# Patient Record
Sex: FEMALE | Race: WHITE | Employment: OTHER | URBAN - METROPOLITAN AREA
[De-identification: names, ages, dates, MRNs, and addresses within clinical notes are randomized per-mention and may not be internally consistent; named-entity substitution may affect disease eponyms.]

---

## 2017-03-13 PROBLEM — M17.11 PRIMARY OSTEOARTHRITIS OF RIGHT KNEE: Status: ACTIVE | Noted: 2017-03-13

## 2017-03-13 PROBLEM — S83.249A ACUTE MEDIAL MENISCAL TEAR: Status: ACTIVE | Noted: 2017-03-13

## 2017-05-05 PROBLEM — M65.90 SYNOVITIS: Status: ACTIVE | Noted: 2017-05-05

## 2017-05-05 PROBLEM — M65.9 SYNOVITIS: Status: ACTIVE | Noted: 2017-05-05

## 2017-05-05 PROBLEM — S83.271A COMPLEX TEAR OF LATERAL MENISCUS OF RIGHT KNEE AS CURRENT INJURY: Status: ACTIVE | Noted: 2017-05-05

## 2018-04-10 ENCOUNTER — HOSPITAL ENCOUNTER (EMERGENCY)
Age: 53
Discharge: HOME | End: 2018-04-10
Payer: COMMERCIAL

## 2018-04-10 ENCOUNTER — APPOINTMENT (OUTPATIENT)
Dept: GENERAL RADIOLOGY | Age: 53
End: 2018-04-10
Payer: COMMERCIAL

## 2018-04-10 ENCOUNTER — APPOINTMENT (OUTPATIENT)
Dept: CT IMAGING | Age: 53
End: 2018-04-10
Payer: COMMERCIAL

## 2018-04-10 VITALS
TEMPERATURE: 97.6 F | OXYGEN SATURATION: 97 % | WEIGHT: 160 LBS | DIASTOLIC BLOOD PRESSURE: 74 MMHG | BODY MASS INDEX: 24.25 KG/M2 | RESPIRATION RATE: 18 BRPM | HEART RATE: 88 BPM | SYSTOLIC BLOOD PRESSURE: 112 MMHG | HEIGHT: 68 IN

## 2018-04-10 DIAGNOSIS — N39.0 URINARY TRACT INFECTION WITHOUT HEMATURIA, SITE UNSPECIFIED: ICD-10-CM

## 2018-04-10 DIAGNOSIS — S39.012A LUMBOSACRAL STRAIN, INITIAL ENCOUNTER: Primary | ICD-10-CM

## 2018-04-10 DIAGNOSIS — K80.20 CALCULUS OF GALLBLADDER WITHOUT CHOLECYSTITIS WITHOUT OBSTRUCTION: ICD-10-CM

## 2018-04-10 LAB
ANION GAP SERPL CALCULATED.3IONS-SCNC: 9 MMOL/L (ref 7–16)
BACTERIA: ABNORMAL /HPF
BASOPHILS ABSOLUTE: 0.02 E9/L (ref 0–0.2)
BASOPHILS RELATIVE PERCENT: 0.4 % (ref 0–2)
BILIRUBIN URINE: NEGATIVE
BLOOD, URINE: ABNORMAL
BUN BLDV-MCNC: 7 MG/DL (ref 6–20)
CALCIUM SERPL-MCNC: 9.5 MG/DL (ref 8.6–10.2)
CHLORIDE BLD-SCNC: 103 MMOL/L (ref 98–107)
CLARITY: ABNORMAL
CO2: 28 MMOL/L (ref 22–29)
COLOR: YELLOW
CREAT SERPL-MCNC: 0.6 MG/DL (ref 0.5–1)
EOSINOPHILS ABSOLUTE: 0.06 E9/L (ref 0.05–0.5)
EOSINOPHILS RELATIVE PERCENT: 1.2 % (ref 0–6)
EPITHELIAL CELLS, UA: ABNORMAL /HPF
GFR AFRICAN AMERICAN: >60
GFR NON-AFRICAN AMERICAN: >60 ML/MIN/1.73
GLUCOSE BLD-MCNC: 91 MG/DL (ref 74–109)
GLUCOSE URINE: NEGATIVE MG/DL
HCT VFR BLD CALC: 45.5 % (ref 34–48)
HEMOGLOBIN: 15.2 G/DL (ref 11.5–15.5)
IMMATURE GRANULOCYTES #: 0.01 E9/L
IMMATURE GRANULOCYTES %: 0.2 % (ref 0–5)
KETONES, URINE: NEGATIVE MG/DL
LEUKOCYTE ESTERASE, URINE: NEGATIVE
LYMPHOCYTES ABSOLUTE: 2.27 E9/L (ref 1.5–4)
LYMPHOCYTES RELATIVE PERCENT: 44.9 % (ref 20–42)
MCH RBC QN AUTO: 31.3 PG (ref 26–35)
MCHC RBC AUTO-ENTMCNC: 33.4 % (ref 32–34.5)
MCV RBC AUTO: 93.8 FL (ref 80–99.9)
MONOCYTES ABSOLUTE: 0.34 E9/L (ref 0.1–0.95)
MONOCYTES RELATIVE PERCENT: 6.7 % (ref 2–12)
NEUTROPHILS ABSOLUTE: 2.36 E9/L (ref 1.8–7.3)
NEUTROPHILS RELATIVE PERCENT: 46.6 % (ref 43–80)
NITRITE, URINE: NEGATIVE
PDW BLD-RTO: 12.1 FL (ref 11.5–15)
PH UA: 6.5 (ref 5–9)
PLATELET # BLD: 171 E9/L (ref 130–450)
PMV BLD AUTO: 11 FL (ref 7–12)
POTASSIUM SERPL-SCNC: 4 MMOL/L (ref 3.5–5)
PROTEIN UA: NEGATIVE MG/DL
RBC # BLD: 4.85 E12/L (ref 3.5–5.5)
RBC UA: ABNORMAL /HPF (ref 0–2)
SODIUM BLD-SCNC: 140 MMOL/L (ref 132–146)
SPECIFIC GRAVITY UA: 1.01 (ref 1–1.03)
UROBILINOGEN, URINE: 0.2 E.U./DL
WBC # BLD: 5.1 E9/L (ref 4.5–11.5)
WBC UA: ABNORMAL /HPF (ref 0–5)

## 2018-04-10 PROCEDURE — 74176 CT ABD & PELVIS W/O CONTRAST: CPT

## 2018-04-10 PROCEDURE — 85025 COMPLETE CBC W/AUTO DIFF WBC: CPT

## 2018-04-10 PROCEDURE — 72100 X-RAY EXAM L-S SPINE 2/3 VWS: CPT

## 2018-04-10 PROCEDURE — 80048 BASIC METABOLIC PNL TOTAL CA: CPT

## 2018-04-10 PROCEDURE — 81001 URINALYSIS AUTO W/SCOPE: CPT

## 2018-04-10 PROCEDURE — 36415 COLL VENOUS BLD VENIPUNCTURE: CPT

## 2018-04-10 PROCEDURE — 99212 OFFICE O/P EST SF 10 MIN: CPT

## 2018-04-10 RX ORDER — NAPROXEN 500 MG/1
500 TABLET ORAL 2 TIMES DAILY
Qty: 14 TABLET | Refills: 0 | Status: SHIPPED | OUTPATIENT
Start: 2018-04-10 | End: 2018-04-26

## 2018-04-10 RX ORDER — NITROFURANTOIN 25; 75 MG/1; MG/1
100 CAPSULE ORAL 2 TIMES DAILY
Qty: 10 CAPSULE | Refills: 0 | Status: SHIPPED | OUTPATIENT
Start: 2018-04-10 | End: 2018-04-15

## 2018-04-10 RX ORDER — CYCLOBENZAPRINE HCL 10 MG
10 TABLET ORAL 2 TIMES DAILY PRN
Qty: 10 TABLET | Refills: 0 | Status: SHIPPED | OUTPATIENT
Start: 2018-04-10 | End: 2018-04-15

## 2018-04-10 ASSESSMENT — PAIN DESCRIPTION - LOCATION: LOCATION: BACK

## 2018-04-10 ASSESSMENT — PAIN DESCRIPTION - PAIN TYPE: TYPE: ACUTE PAIN

## 2018-04-10 ASSESSMENT — PAIN DESCRIPTION - ORIENTATION: ORIENTATION: RIGHT;LEFT;UPPER;LOWER

## 2018-04-10 ASSESSMENT — PAIN DESCRIPTION - FREQUENCY: FREQUENCY: CONTINUOUS

## 2018-04-10 ASSESSMENT — PAIN DESCRIPTION - ONSET: ONSET: GRADUAL

## 2018-04-10 ASSESSMENT — PAIN DESCRIPTION - DESCRIPTORS: DESCRIPTORS: SHOOTING;ACHING

## 2018-04-10 ASSESSMENT — PAIN DESCRIPTION - PROGRESSION: CLINICAL_PROGRESSION: GRADUALLY WORSENING

## 2018-04-10 ASSESSMENT — PAIN SCALES - GENERAL: PAINLEVEL_OUTOF10: 8

## 2018-04-11 ENCOUNTER — CARE COORDINATION (OUTPATIENT)
Dept: CARE COORDINATION | Age: 53
End: 2018-04-11

## 2018-04-13 ENCOUNTER — OFFICE VISIT (OUTPATIENT)
Dept: FAMILY MEDICINE CLINIC | Age: 53
End: 2018-04-13
Payer: COMMERCIAL

## 2018-04-13 VITALS
WEIGHT: 175 LBS | RESPIRATION RATE: 16 BRPM | SYSTOLIC BLOOD PRESSURE: 100 MMHG | HEART RATE: 85 BPM | DIASTOLIC BLOOD PRESSURE: 56 MMHG | OXYGEN SATURATION: 98 % | TEMPERATURE: 97.9 F | BODY MASS INDEX: 26.52 KG/M2 | HEIGHT: 68 IN

## 2018-04-13 DIAGNOSIS — R31.9 URINARY TRACT INFECTION WITH HEMATURIA, SITE UNSPECIFIED: Primary | ICD-10-CM

## 2018-04-13 DIAGNOSIS — N39.0 URINARY TRACT INFECTION WITH HEMATURIA, SITE UNSPECIFIED: Primary | ICD-10-CM

## 2018-04-13 PROCEDURE — G8419 CALC BMI OUT NRM PARAM NOF/U: HCPCS | Performed by: NURSE PRACTITIONER

## 2018-04-13 PROCEDURE — G8427 DOCREV CUR MEDS BY ELIG CLIN: HCPCS | Performed by: NURSE PRACTITIONER

## 2018-04-13 PROCEDURE — 4004F PT TOBACCO SCREEN RCVD TLK: CPT | Performed by: NURSE PRACTITIONER

## 2018-04-13 PROCEDURE — 99213 OFFICE O/P EST LOW 20 MIN: CPT | Performed by: NURSE PRACTITIONER

## 2018-04-13 PROCEDURE — 3017F COLORECTAL CA SCREEN DOC REV: CPT | Performed by: NURSE PRACTITIONER

## 2018-04-13 PROCEDURE — 3014F SCREEN MAMMO DOC REV: CPT | Performed by: NURSE PRACTITIONER

## 2018-04-13 ASSESSMENT — ENCOUNTER SYMPTOMS
WHEEZING: 0
SHORTNESS OF BREATH: 0
ABDOMINAL PAIN: 1
VOMITING: 0
CONSTIPATION: 0
COUGH: 0
DIARRHEA: 0
NAUSEA: 0

## 2018-04-15 ENCOUNTER — HOSPITAL ENCOUNTER (EMERGENCY)
Age: 53
Discharge: HOME | End: 2018-04-15
Attending: EMERGENCY MEDICINE
Payer: COMMERCIAL

## 2018-04-15 VITALS
DIASTOLIC BLOOD PRESSURE: 77 MMHG | RESPIRATION RATE: 18 BRPM | TEMPERATURE: 98.4 F | SYSTOLIC BLOOD PRESSURE: 109 MMHG | HEIGHT: 68 IN | WEIGHT: 176 LBS | OXYGEN SATURATION: 97 % | BODY MASS INDEX: 26.67 KG/M2 | HEART RATE: 88 BPM

## 2018-04-15 DIAGNOSIS — I47.1 SVT (SUPRAVENTRICULAR TACHYCARDIA) (HCC): Primary | ICD-10-CM

## 2018-04-15 LAB
ANION GAP SERPL CALCULATED.3IONS-SCNC: 12 MMOL/L (ref 7–16)
BUN BLDV-MCNC: 8 MG/DL (ref 6–20)
CALCIUM SERPL-MCNC: 9.5 MG/DL (ref 8.6–10.2)
CHLORIDE BLD-SCNC: 105 MMOL/L (ref 98–107)
CO2: 25 MMOL/L (ref 22–29)
CREAT SERPL-MCNC: 0.6 MG/DL (ref 0.5–1)
GFR AFRICAN AMERICAN: >60
GFR NON-AFRICAN AMERICAN: >60 ML/MIN/1.73
GLUCOSE BLD-MCNC: 122 MG/DL (ref 74–109)
MAGNESIUM: 2.1 MG/DL (ref 1.6–2.6)
POTASSIUM SERPL-SCNC: 3.6 MMOL/L (ref 3.5–5)
SODIUM BLD-SCNC: 142 MMOL/L (ref 132–146)

## 2018-04-15 PROCEDURE — 99285 EMERGENCY DEPT VISIT HI MDM: CPT

## 2018-04-15 PROCEDURE — 80048 BASIC METABOLIC PNL TOTAL CA: CPT

## 2018-04-15 PROCEDURE — 36415 COLL VENOUS BLD VENIPUNCTURE: CPT

## 2018-04-15 PROCEDURE — 83735 ASSAY OF MAGNESIUM: CPT

## 2018-04-15 ASSESSMENT — ENCOUNTER SYMPTOMS
COUGH: 0
CONSTIPATION: 0
VOMITING: 0
WHEEZING: 0
DOUBLE VISION: 0
SPUTUM PRODUCTION: 0
BLURRED VISION: 0
BLOOD IN STOOL: 0
NAUSEA: 0
SORE THROAT: 0
HEARTBURN: 0
DIARRHEA: 0

## 2018-04-23 DIAGNOSIS — E55.9 VITAMIN D DEFICIENCY: ICD-10-CM

## 2018-04-23 DIAGNOSIS — E78.2 MIXED HYPERLIPIDEMIA: ICD-10-CM

## 2018-04-23 RX ORDER — OMEPRAZOLE 40 MG/1
40 CAPSULE, DELAYED RELEASE ORAL DAILY
Qty: 30 CAPSULE | Refills: 3 | Status: SHIPPED | OUTPATIENT
Start: 2018-04-23 | End: 2018-09-19 | Stop reason: SDUPTHER

## 2018-04-23 RX ORDER — ACETAMINOPHEN 160 MG
1 TABLET,DISINTEGRATING ORAL DAILY
Qty: 30 CAPSULE | Refills: 3 | Status: SHIPPED | OUTPATIENT
Start: 2018-04-23 | End: 2018-09-19 | Stop reason: SDUPTHER

## 2018-04-23 RX ORDER — ATORVASTATIN CALCIUM 20 MG/1
20 TABLET, FILM COATED ORAL DAILY
Qty: 30 TABLET | Refills: 3 | Status: SHIPPED | OUTPATIENT
Start: 2018-04-23 | End: 2018-09-19 | Stop reason: SDUPTHER

## 2018-04-26 ENCOUNTER — OFFICE VISIT (OUTPATIENT)
Dept: FAMILY MEDICINE CLINIC | Age: 53
End: 2018-04-26
Payer: COMMERCIAL

## 2018-04-26 VITALS
OXYGEN SATURATION: 99 % | BODY MASS INDEX: 26.37 KG/M2 | WEIGHT: 174 LBS | HEART RATE: 67 BPM | HEIGHT: 68 IN | DIASTOLIC BLOOD PRESSURE: 82 MMHG | RESPIRATION RATE: 16 BRPM | SYSTOLIC BLOOD PRESSURE: 120 MMHG | TEMPERATURE: 98 F

## 2018-04-26 DIAGNOSIS — I47.1 PAROXYSMAL SVT (SUPRAVENTRICULAR TACHYCARDIA) (HCC): Primary | ICD-10-CM

## 2018-04-26 PROCEDURE — G8419 CALC BMI OUT NRM PARAM NOF/U: HCPCS | Performed by: FAMILY MEDICINE

## 2018-04-26 PROCEDURE — G8427 DOCREV CUR MEDS BY ELIG CLIN: HCPCS | Performed by: FAMILY MEDICINE

## 2018-04-26 PROCEDURE — 4004F PT TOBACCO SCREEN RCVD TLK: CPT | Performed by: FAMILY MEDICINE

## 2018-04-26 PROCEDURE — 99213 OFFICE O/P EST LOW 20 MIN: CPT | Performed by: FAMILY MEDICINE

## 2018-04-26 PROCEDURE — 3017F COLORECTAL CA SCREEN DOC REV: CPT | Performed by: FAMILY MEDICINE

## 2018-04-26 RX ORDER — METOPROLOL SUCCINATE 25 MG/1
25 TABLET, EXTENDED RELEASE ORAL DAILY
Qty: 90 TABLET | Refills: 3 | Status: SHIPPED | OUTPATIENT
Start: 2018-04-26 | End: 2018-05-07

## 2018-04-26 ASSESSMENT — ENCOUNTER SYMPTOMS
BLURRED VISION: 0
HEMOPTYSIS: 0
COUGH: 0
NAUSEA: 0
DOUBLE VISION: 0
HEARTBURN: 0

## 2018-05-07 ENCOUNTER — OFFICE VISIT (OUTPATIENT)
Dept: CARDIOLOGY CLINIC | Age: 53
End: 2018-05-07
Payer: COMMERCIAL

## 2018-05-07 VITALS
BODY MASS INDEX: 26.37 KG/M2 | HEART RATE: 74 BPM | WEIGHT: 174 LBS | HEIGHT: 68 IN | SYSTOLIC BLOOD PRESSURE: 80 MMHG | DIASTOLIC BLOOD PRESSURE: 60 MMHG

## 2018-05-07 DIAGNOSIS — I95.2 HYPOTENSION DUE TO DRUGS: ICD-10-CM

## 2018-05-07 DIAGNOSIS — I47.1 PAROXYSMAL SVT (SUPRAVENTRICULAR TACHYCARDIA) (HCC): ICD-10-CM

## 2018-05-07 DIAGNOSIS — R00.0 TACHYCARDIA: Primary | ICD-10-CM

## 2018-05-07 PROCEDURE — 93000 ELECTROCARDIOGRAM COMPLETE: CPT | Performed by: INTERNAL MEDICINE

## 2018-05-07 PROCEDURE — 3017F COLORECTAL CA SCREEN DOC REV: CPT | Performed by: NURSE PRACTITIONER

## 2018-05-07 PROCEDURE — G8427 DOCREV CUR MEDS BY ELIG CLIN: HCPCS | Performed by: NURSE PRACTITIONER

## 2018-05-07 PROCEDURE — 99213 OFFICE O/P EST LOW 20 MIN: CPT | Performed by: NURSE PRACTITIONER

## 2018-05-07 PROCEDURE — G8419 CALC BMI OUT NRM PARAM NOF/U: HCPCS | Performed by: NURSE PRACTITIONER

## 2018-05-07 PROCEDURE — 4004F PT TOBACCO SCREEN RCVD TLK: CPT | Performed by: NURSE PRACTITIONER

## 2018-07-31 ENCOUNTER — HOSPITAL ENCOUNTER (EMERGENCY)
Age: 53
Discharge: HOME | End: 2018-07-31
Attending: EMERGENCY MEDICINE
Payer: COMMERCIAL

## 2018-07-31 ENCOUNTER — APPOINTMENT (OUTPATIENT)
Dept: GENERAL RADIOLOGY | Age: 53
End: 2018-07-31
Payer: COMMERCIAL

## 2018-07-31 VITALS
HEIGHT: 68 IN | TEMPERATURE: 97.7 F | OXYGEN SATURATION: 96 % | DIASTOLIC BLOOD PRESSURE: 58 MMHG | HEART RATE: 79 BPM | SYSTOLIC BLOOD PRESSURE: 121 MMHG | WEIGHT: 170 LBS | RESPIRATION RATE: 18 BRPM | BODY MASS INDEX: 25.76 KG/M2

## 2018-07-31 DIAGNOSIS — S63.501A SPRAIN OF RIGHT FOREARM, INITIAL ENCOUNTER: Primary | ICD-10-CM

## 2018-07-31 PROCEDURE — 99283 EMERGENCY DEPT VISIT LOW MDM: CPT

## 2018-07-31 PROCEDURE — 73090 X-RAY EXAM OF FOREARM: CPT

## 2018-07-31 ASSESSMENT — PAIN DESCRIPTION - ORIENTATION: ORIENTATION: RIGHT

## 2018-07-31 ASSESSMENT — ENCOUNTER SYMPTOMS
WHEEZING: 0
DIARRHEA: 0
VOMITING: 0
NAUSEA: 0
COUGH: 0
SHORTNESS OF BREATH: 0
SORE THROAT: 0
ABDOMINAL DISTENTION: 0
EYE DISCHARGE: 0
SINUS PRESSURE: 0
BACK PAIN: 0
EYE PAIN: 0
EYE REDNESS: 0

## 2018-07-31 ASSESSMENT — PAIN DESCRIPTION - PAIN TYPE: TYPE: ACUTE PAIN

## 2018-07-31 ASSESSMENT — PAIN DESCRIPTION - LOCATION: LOCATION: ARM

## 2018-07-31 ASSESSMENT — PAIN SCALES - GENERAL: PAINLEVEL_OUTOF10: 7

## 2018-07-31 NOTE — ED PROVIDER NOTES
normal heart sounds. No murmur heard. Pulmonary/Chest: Effort normal and breath sounds normal. No respiratory distress. She has no wheezes. She has no rales. Abdominal: Soft. Bowel sounds are normal. There is no tenderness. There is no rebound and no guarding. Musculoskeletal: She exhibits no edema. Tenderness to palpation of the right mid forearm   Neurological: She is alert and oriented to person, place, and time. No cranial nerve deficit. Coordination normal.   Skin: Skin is warm and dry. Nursing note and vitals reviewed. Procedures    MDM    Patient presents to the ED for right arm pain. Differential diagnoses included but not limited to sprain vs doubt fracture. Workup in the ED revealed no fracture. Patient was given ice, ace wrap for their symptoms with moderate improvement. Patient continues to be non-toxic on re-evaluation. Findings were discussed with the patient and reasons to immediately return to the ED were articulated to them. They will follow-up with their PMD.           --------------------------------------------- PAST HISTORY ---------------------------------------------  Past Medical History:  has a past medical history of Cancer ([facility] [address] [facility] DJD (degenerative joint disease) of knee; GERD (gastroesophageal reflux disease); Headache; Hiatal hernia; Mixed hyperlipidemia; and SV[facility]ra[facility]) [facility] Past Surgical History:  has a past surgical history that includes back surgery; knee surgery (Left); Tonsillectomy; Tubal ligation; Endometrial ablation; Knee arthroscopy (Right, 05/05/2017); Endometrial ablation; and bone biopsy. Social History:  reports that she has been smoking Cigarettes. She has a 30.00 pack-year smoking history. She has never used smokeless tobacco. She reports that she does not drink alcohol or use drugs. Family History: family history includes High Blood Pressure in her father and mother. on file       Diagnosis:  1. Sprain of right forearm, initial encounter        Disposition:  Patient's disposition: Discharge to home  Patient's condition is stable.        Darrius Call,   Resident  07/31/18 0841

## 2018-08-03 ENCOUNTER — CARE COORDINATION (OUTPATIENT)
Dept: CARE COORDINATION | Age: 53
End: 2018-08-03

## 2018-09-19 DIAGNOSIS — E55.9 VITAMIN D DEFICIENCY: ICD-10-CM

## 2018-09-19 DIAGNOSIS — E78.2 MIXED HYPERLIPIDEMIA: ICD-10-CM

## 2018-09-19 RX ORDER — OMEPRAZOLE 40 MG/1
40 CAPSULE, DELAYED RELEASE ORAL DAILY
Qty: 30 CAPSULE | Refills: 3 | Status: SHIPPED | OUTPATIENT
Start: 2018-09-19 | End: 2019-05-14

## 2018-09-19 RX ORDER — ATORVASTATIN CALCIUM 20 MG/1
20 TABLET, FILM COATED ORAL DAILY
Qty: 30 TABLET | Refills: 3 | Status: SHIPPED | OUTPATIENT
Start: 2018-09-19 | End: 2019-05-14 | Stop reason: SDUPTHER

## 2018-09-19 RX ORDER — ACETAMINOPHEN 160 MG
1 TABLET,DISINTEGRATING ORAL DAILY
Qty: 30 CAPSULE | Refills: 3 | Status: SHIPPED | OUTPATIENT
Start: 2018-09-19 | End: 2019-05-14 | Stop reason: SDUPTHER

## 2018-10-30 ENCOUNTER — APPOINTMENT (OUTPATIENT)
Dept: GENERAL RADIOLOGY | Age: 53
End: 2018-10-30
Payer: COMMERCIAL

## 2018-10-30 ENCOUNTER — TELEPHONE (OUTPATIENT)
Dept: ADMINISTRATIVE | Age: 53
End: 2018-10-30

## 2018-10-30 ENCOUNTER — HOSPITAL ENCOUNTER (EMERGENCY)
Age: 53
Discharge: HOME | End: 2018-10-30
Attending: EMERGENCY MEDICINE
Payer: COMMERCIAL

## 2018-10-30 VITALS
WEIGHT: 185.56 LBS | RESPIRATION RATE: 18 BRPM | DIASTOLIC BLOOD PRESSURE: 64 MMHG | TEMPERATURE: 97.7 F | HEIGHT: 68 IN | BODY MASS INDEX: 28.12 KG/M2 | SYSTOLIC BLOOD PRESSURE: 115 MMHG | HEART RATE: 85 BPM | OXYGEN SATURATION: 98 %

## 2018-10-30 DIAGNOSIS — M25.562 ACUTE PAIN OF BOTH KNEES: Primary | ICD-10-CM

## 2018-10-30 DIAGNOSIS — M25.561 ACUTE PAIN OF BOTH KNEES: Primary | ICD-10-CM

## 2018-10-30 PROCEDURE — 73562 X-RAY EXAM OF KNEE 3: CPT

## 2018-10-30 PROCEDURE — 99283 EMERGENCY DEPT VISIT LOW MDM: CPT

## 2018-10-30 RX ORDER — NAPROXEN 500 MG/1
500 TABLET ORAL 2 TIMES DAILY PRN
Qty: 28 TABLET | Refills: 0 | Status: SHIPPED | OUTPATIENT
Start: 2018-10-30 | End: 2019-05-14

## 2018-10-30 ASSESSMENT — PAIN DESCRIPTION - LOCATION: LOCATION: KNEE

## 2018-10-30 ASSESSMENT — PAIN DESCRIPTION - ORIENTATION: ORIENTATION: LEFT

## 2018-10-30 ASSESSMENT — PAIN SCALES - GENERAL: PAINLEVEL_OUTOF10: 10

## 2018-10-30 ASSESSMENT — PAIN DESCRIPTION - DESCRIPTORS: DESCRIPTORS: ACHING;DISCOMFORT

## 2018-10-30 ASSESSMENT — PAIN DESCRIPTION - PAIN TYPE: TYPE: ACUTE PAIN

## 2018-10-31 ENCOUNTER — CARE COORDINATION (OUTPATIENT)
Dept: CARE COORDINATION | Age: 53
End: 2018-10-31

## 2018-10-31 NOTE — CARE COORDINATION
Ambulatory Care Coordination ED Follow up Call       Reason for ED Visit:  Knee pain  Care Management Risk Score: CMRS 8    Left voice message for patient regarding ED follow up call. Requested patient please return call;  CCSS contact information provided.

## 2018-11-08 ENCOUNTER — TELEPHONE (OUTPATIENT)
Dept: ORTHOPEDIC SURGERY | Age: 53
End: 2018-11-08

## 2018-11-12 ENCOUNTER — OFFICE VISIT (OUTPATIENT)
Dept: CARDIOLOGY CLINIC | Age: 53
End: 2018-11-12
Payer: COMMERCIAL

## 2018-11-12 VITALS
BODY MASS INDEX: 28.79 KG/M2 | HEART RATE: 81 BPM | DIASTOLIC BLOOD PRESSURE: 70 MMHG | WEIGHT: 190 LBS | RESPIRATION RATE: 18 BRPM | SYSTOLIC BLOOD PRESSURE: 112 MMHG | HEIGHT: 68 IN

## 2018-11-12 DIAGNOSIS — R07.89 ATYPICAL CHEST PAIN: ICD-10-CM

## 2018-11-12 DIAGNOSIS — R00.2 PALPITATIONS: ICD-10-CM

## 2018-11-12 DIAGNOSIS — I47.1 PAROXYSMAL SVT (SUPRAVENTRICULAR TACHYCARDIA) (HCC): Primary | ICD-10-CM

## 2018-11-12 DIAGNOSIS — J44.9 CHRONIC OBSTRUCTIVE PULMONARY DISEASE, UNSPECIFIED COPD TYPE (HCC): ICD-10-CM

## 2018-11-12 DIAGNOSIS — E78.2 MIXED HYPERLIPIDEMIA: ICD-10-CM

## 2018-11-12 PROCEDURE — G8926 SPIRO NO PERF OR DOC: HCPCS | Performed by: INTERNAL MEDICINE

## 2018-11-12 PROCEDURE — 3023F SPIROM DOC REV: CPT | Performed by: INTERNAL MEDICINE

## 2018-11-12 PROCEDURE — 93000 ELECTROCARDIOGRAM COMPLETE: CPT | Performed by: INTERNAL MEDICINE

## 2018-11-12 PROCEDURE — 3017F COLORECTAL CA SCREEN DOC REV: CPT | Performed by: INTERNAL MEDICINE

## 2018-11-12 PROCEDURE — 4004F PT TOBACCO SCREEN RCVD TLK: CPT | Performed by: INTERNAL MEDICINE

## 2018-11-12 PROCEDURE — 99214 OFFICE O/P EST MOD 30 MIN: CPT | Performed by: INTERNAL MEDICINE

## 2018-11-12 PROCEDURE — G8419 CALC BMI OUT NRM PARAM NOF/U: HCPCS | Performed by: INTERNAL MEDICINE

## 2018-11-12 PROCEDURE — G8427 DOCREV CUR MEDS BY ELIG CLIN: HCPCS | Performed by: INTERNAL MEDICINE

## 2018-11-12 PROCEDURE — G8484 FLU IMMUNIZE NO ADMIN: HCPCS | Performed by: INTERNAL MEDICINE

## 2018-11-12 NOTE — PROGRESS NOTES
ABLATION      KNEE ARTHROSCOPY Right 05/05/2017    KNEE SURGERY Left     arthroscopy    TONSILLECTOMY      TUBAL LIGATION         Family History:  Family History   Problem Relation Age of Onset    High Blood Pressure Mother     High Blood Pressure Father        Social History:  Social History     Social History    Marital status:      Spouse name: N/A    Number of children: N/A    Years of education: N/A     Occupational History    Not on file. Social History Main Topics    Smoking status: Current Every Day Smoker     Packs/day: 1.00     Years: 30.00     Types: Cigarettes    Smokeless tobacco: Never Used    Alcohol use No      Comment: social    Drug use: No    Sexual activity: Yes     Partners: Male     Other Topics Concern    Not on file     Social History Narrative    No narrative on file       Allergies: Allergies   Allergen Reactions    Cortisone Other (See Comments)     Redness and breaking into a sweat.  Medrol [Methylprednisolone]      Fever; redness; sweat    Oxycontin [Oxycodone Hcl] Itching    Penicillins      Cant remember reaction was from childhood       Current Medications:  Current Outpatient Prescriptions   Medication Sig Dispense Refill    naproxen (NAPROSYN) 500 MG tablet Take 1 tablet by mouth 2 times daily as needed for Pain 28 tablet 0    atorvastatin (LIPITOR) 20 MG tablet Take 1 tablet by mouth daily 30 tablet 3    omeprazole (PRILOSEC) 40 MG delayed release capsule Take 1 capsule by mouth daily 30 capsule 3    metoprolol tartrate (LOPRESSOR) 25 MG tablet As needed for palpitations 30 tablet 1    Cholecalciferol (VITAMIN D3) 2000 units CAPS Take 1 capsule by mouth daily 30 capsule 3     No current facility-administered medications for this visit.           Physical Exam:  /70   Pulse 81   Resp 18   Ht 5' 8\" (1.727 m)   Wt 190 lb (86.2 kg)   BMI 28.89 kg/m²   Wt Readings from Last 3 Encounters:   11/12/18 190 lb (86.2 kg)   10/30/18 185 lb 9 unremarkable      ASSESSMENT / PLAN:  On a clinical basis, the patient presents with atypically described reproducible chest discomfort without ischemic descriptors and of a noncardiovascular origin. She additionally relates no significant changes of her chronic palpitations with no additional arrhythmia related symptoms. On this basis, maintained her existing medical regimen of the as needed use of beta blocker therapy for palpitations associated with tachycardia and extensively discussed her needs appropriate lifestyle modification, particularly that of the cessation of tobacco resumption to reduce risk of future atherosclerotic development. Continued aggressive risk factor modification of blood pressure and serum lipids will remain essential to reducing risk of future atherosclerotic development. I plan her clinical reevaluation in one year and would happily reevaluate her in the interim should additional cardiovascular difficulties or concerns arise    The patient's current medication list, allergies, problem list and results of all previously ordered testing were reviewed at today's visit. Follow-up office visit in 1 year      Note: This report was completed using computerized voice recognition software. Every effort has been made to ensure accuracy, however; and invert and computerized transcription errors may be present. Abdifatah Burgos, [address] [facility]    An electronic copy of this follow-up progress note was forwarded to Dr. Latrell Prince

## 2018-12-21 ENCOUNTER — HOSPITAL ENCOUNTER (EMERGENCY)
Age: 53
Discharge: HOME | End: 2018-12-21
Payer: COMMERCIAL

## 2018-12-21 VITALS
HEIGHT: 68 IN | SYSTOLIC BLOOD PRESSURE: 119 MMHG | TEMPERATURE: 97.7 F | BODY MASS INDEX: 25.76 KG/M2 | HEART RATE: 83 BPM | RESPIRATION RATE: 20 BRPM | DIASTOLIC BLOOD PRESSURE: 57 MMHG | OXYGEN SATURATION: 99 % | WEIGHT: 170 LBS

## 2018-12-21 DIAGNOSIS — J20.9 ACUTE BRONCHITIS, UNSPECIFIED ORGANISM: Primary | ICD-10-CM

## 2018-12-21 DIAGNOSIS — J01.00 ACUTE MAXILLARY SINUSITIS, RECURRENCE NOT SPECIFIED: ICD-10-CM

## 2018-12-21 PROCEDURE — 99283 EMERGENCY DEPT VISIT LOW MDM: CPT

## 2018-12-21 RX ORDER — OXYMETAZOLINE HYDROCHLORIDE 0.05 G/100ML
2 SPRAY NASAL 2 TIMES DAILY
Qty: 1 BOTTLE | Refills: 0 | Status: SHIPPED | OUTPATIENT
Start: 2018-12-21 | End: 2019-01-20

## 2018-12-21 RX ORDER — BENZONATATE 100 MG/1
100 CAPSULE ORAL 3 TIMES DAILY PRN
Qty: 21 CAPSULE | Refills: 0 | Status: SHIPPED | OUTPATIENT
Start: 2018-12-21 | End: 2018-12-28

## 2018-12-21 RX ORDER — PSEUDOEPHEDRINE HYDROCHLORIDE 30 MG/1
30 TABLET ORAL EVERY 4 HOURS PRN
Qty: 42 TABLET | Refills: 1 | Status: SHIPPED | OUTPATIENT
Start: 2018-12-21 | End: 2018-12-28

## 2018-12-21 RX ORDER — AZITHROMYCIN 250 MG/1
TABLET, FILM COATED ORAL
Qty: 1 PACKET | Refills: 0 | Status: SHIPPED | OUTPATIENT
Start: 2018-12-21 | End: 2018-12-25

## 2018-12-21 RX ORDER — GUAIFENESIN AND DEXTROMETHORPHAN HYDROBROMIDE 1200; 60 MG/1; MG/1
1 TABLET, EXTENDED RELEASE ORAL EVERY 12 HOURS PRN
Qty: 28 TABLET | Refills: 0 | Status: SHIPPED | OUTPATIENT
Start: 2018-12-21 | End: 2019-05-14

## 2018-12-21 RX ORDER — METHYLPREDNISOLONE 4 MG/1
TABLET ORAL
Qty: 21 TABLET | Status: SHIPPED | OUTPATIENT
Start: 2018-12-21 | End: 2018-12-27

## 2019-05-14 ENCOUNTER — OFFICE VISIT (OUTPATIENT)
Dept: FAMILY MEDICINE CLINIC | Age: 54
End: 2019-05-14
Payer: COMMERCIAL

## 2019-05-14 VITALS
RESPIRATION RATE: 16 BRPM | HEIGHT: 68 IN | BODY MASS INDEX: 28.64 KG/M2 | DIASTOLIC BLOOD PRESSURE: 72 MMHG | TEMPERATURE: 97.9 F | HEART RATE: 86 BPM | SYSTOLIC BLOOD PRESSURE: 118 MMHG | OXYGEN SATURATION: 97 % | WEIGHT: 189 LBS

## 2019-05-14 DIAGNOSIS — K21.9 GASTROESOPHAGEAL REFLUX DISEASE WITHOUT ESOPHAGITIS: Primary | ICD-10-CM

## 2019-05-14 DIAGNOSIS — E55.9 VITAMIN D DEFICIENCY: ICD-10-CM

## 2019-05-14 DIAGNOSIS — E78.2 MIXED HYPERLIPIDEMIA: ICD-10-CM

## 2019-05-14 PROCEDURE — G8427 DOCREV CUR MEDS BY ELIG CLIN: HCPCS | Performed by: NURSE PRACTITIONER

## 2019-05-14 PROCEDURE — 99213 OFFICE O/P EST LOW 20 MIN: CPT | Performed by: NURSE PRACTITIONER

## 2019-05-14 PROCEDURE — 3017F COLORECTAL CA SCREEN DOC REV: CPT | Performed by: NURSE PRACTITIONER

## 2019-05-14 PROCEDURE — G8419 CALC BMI OUT NRM PARAM NOF/U: HCPCS | Performed by: NURSE PRACTITIONER

## 2019-05-14 PROCEDURE — 4004F PT TOBACCO SCREEN RCVD TLK: CPT | Performed by: NURSE PRACTITIONER

## 2019-05-14 RX ORDER — LANSOPRAZOLE 30 MG/1
30 CAPSULE, DELAYED RELEASE ORAL DAILY
Qty: 90 CAPSULE | Refills: 0 | Status: SHIPPED | OUTPATIENT
Start: 2019-05-14 | End: 2019-10-09 | Stop reason: SDUPTHER

## 2019-05-14 RX ORDER — ATORVASTATIN CALCIUM 20 MG/1
20 TABLET, FILM COATED ORAL DAILY
Qty: 30 TABLET | Refills: 3 | Status: SHIPPED | OUTPATIENT
Start: 2019-05-14 | End: 2019-10-09 | Stop reason: SDUPTHER

## 2019-05-14 RX ORDER — CHOLECALCIFEROL (VITAMIN D3) 125 MCG
100 CAPSULE ORAL DAILY
Qty: 30 CAPSULE | Refills: 2 | Status: SHIPPED | OUTPATIENT
Start: 2019-05-14 | End: 2019-10-14

## 2019-05-14 RX ORDER — ACETAMINOPHEN 160 MG
1 TABLET,DISINTEGRATING ORAL DAILY
Qty: 30 CAPSULE | Refills: 3 | Status: SHIPPED | OUTPATIENT
Start: 2019-05-14 | End: 2019-10-09 | Stop reason: SDUPTHER

## 2019-05-14 ASSESSMENT — PATIENT HEALTH QUESTIONNAIRE - PHQ9
SUM OF ALL RESPONSES TO PHQ QUESTIONS 1-9: 0
1. LITTLE INTEREST OR PLEASURE IN DOING THINGS: 0
SUM OF ALL RESPONSES TO PHQ QUESTIONS 1-9: 0
SUM OF ALL RESPONSES TO PHQ9 QUESTIONS 1 & 2: 0
2. FEELING DOWN, DEPRESSED OR HOPELESS: 0

## 2019-05-14 ASSESSMENT — ENCOUNTER SYMPTOMS
WHEEZING: 1
NAUSEA: 0
COUGH: 1
SHORTNESS OF BREATH: 1
VOMITING: 0
DIARRHEA: 0
CONSTIPATION: 0

## 2019-06-22 ENCOUNTER — HOSPITAL ENCOUNTER (EMERGENCY)
Age: 54
Discharge: LEFT BEFORE DISCHARGE | End: 2019-06-22
Payer: COMMERCIAL

## 2019-06-22 VITALS
OXYGEN SATURATION: 97 % | SYSTOLIC BLOOD PRESSURE: 111 MMHG | DIASTOLIC BLOOD PRESSURE: 79 MMHG | HEART RATE: 92 BPM | BODY MASS INDEX: 28.57 KG/M2 | RESPIRATION RATE: 20 BRPM | HEIGHT: 68 IN | TEMPERATURE: 98 F | WEIGHT: 188.5 LBS

## 2019-06-22 DIAGNOSIS — R10.13 EPIGASTRIC PAIN: Primary | ICD-10-CM

## 2019-06-22 RX ORDER — KETOROLAC TROMETHAMINE 30 MG/ML
30 INJECTION, SOLUTION INTRAMUSCULAR; INTRAVENOUS ONCE
Status: DISCONTINUED | OUTPATIENT
Start: 2019-06-22 | End: 2019-06-22 | Stop reason: HOSPADM

## 2019-06-22 RX ORDER — 0.9 % SODIUM CHLORIDE 0.9 %
1000 INTRAVENOUS SOLUTION INTRAVENOUS ONCE
Status: DISCONTINUED | OUTPATIENT
Start: 2019-06-22 | End: 2019-06-22 | Stop reason: HOSPADM

## 2019-06-22 RX ORDER — METOCLOPRAMIDE HYDROCHLORIDE 5 MG/ML
10 INJECTION INTRAMUSCULAR; INTRAVENOUS ONCE
Status: DISCONTINUED | OUTPATIENT
Start: 2019-06-22 | End: 2019-06-22 | Stop reason: HOSPADM

## 2019-06-22 RX ORDER — SUCRALFATE 1 G/1
1 TABLET ORAL ONCE
Status: DISCONTINUED | OUTPATIENT
Start: 2019-06-22 | End: 2019-06-22 | Stop reason: HOSPADM

## 2019-06-22 ASSESSMENT — PAIN DESCRIPTION - PAIN TYPE: TYPE: ACUTE PAIN

## 2019-06-22 ASSESSMENT — PAIN DESCRIPTION - FREQUENCY: FREQUENCY: INTERMITTENT

## 2019-06-22 ASSESSMENT — PAIN DESCRIPTION - ORIENTATION: ORIENTATION: MID;UPPER

## 2019-06-22 ASSESSMENT — PAIN SCALES - GENERAL: PAINLEVEL_OUTOF10: 10

## 2019-06-22 ASSESSMENT — PAIN DESCRIPTION - LOCATION: LOCATION: ABDOMEN

## 2019-07-10 ENCOUNTER — APPOINTMENT (OUTPATIENT)
Dept: GENERAL RADIOLOGY | Age: 54
End: 2019-07-10
Payer: COMMERCIAL

## 2019-07-10 ENCOUNTER — HOSPITAL ENCOUNTER (EMERGENCY)
Age: 54
Discharge: HOME | End: 2019-07-10
Attending: EMERGENCY MEDICINE
Payer: COMMERCIAL

## 2019-07-10 VITALS
HEIGHT: 68 IN | WEIGHT: 180 LBS | HEART RATE: 86 BPM | BODY MASS INDEX: 27.28 KG/M2 | RESPIRATION RATE: 15 BRPM | OXYGEN SATURATION: 100 % | DIASTOLIC BLOOD PRESSURE: 65 MMHG | SYSTOLIC BLOOD PRESSURE: 120 MMHG | TEMPERATURE: 97.3 F

## 2019-07-10 DIAGNOSIS — R10.13 EPIGASTRIC PAIN: Primary | ICD-10-CM

## 2019-07-10 LAB
ALBUMIN SERPL-MCNC: 4.7 G/DL (ref 3.5–5.2)
ALP BLD-CCNC: 110 U/L (ref 35–104)
ALT SERPL-CCNC: 24 U/L (ref 0–32)
ANION GAP SERPL CALCULATED.3IONS-SCNC: 12 MMOL/L (ref 7–16)
AST SERPL-CCNC: 20 U/L (ref 0–31)
BACTERIA: ABNORMAL /HPF
BASOPHILS ABSOLUTE: 0.03 E9/L (ref 0–0.2)
BASOPHILS RELATIVE PERCENT: 0.3 % (ref 0–2)
BILIRUB SERPL-MCNC: 0.5 MG/DL (ref 0–1.2)
BILIRUBIN URINE: NEGATIVE
BLOOD, URINE: ABNORMAL
BUN BLDV-MCNC: 7 MG/DL (ref 6–20)
CALCIUM SERPL-MCNC: 9.5 MG/DL (ref 8.6–10.2)
CHLORIDE BLD-SCNC: 101 MMOL/L (ref 98–107)
CLARITY: CLEAR
CO2: 26 MMOL/L (ref 22–29)
COLOR: YELLOW
CREAT SERPL-MCNC: 0.6 MG/DL (ref 0.5–1)
EKG ATRIAL RATE: 96 BPM
EKG P AXIS: 61 DEGREES
EKG P-R INTERVAL: 148 MS
EKG Q-T INTERVAL: 356 MS
EKG QRS DURATION: 78 MS
EKG QTC CALCULATION (BAZETT): 449 MS
EKG R AXIS: 18 DEGREES
EKG T AXIS: 40 DEGREES
EKG VENTRICULAR RATE: 96 BPM
EOSINOPHILS ABSOLUTE: 0.06 E9/L (ref 0.05–0.5)
EOSINOPHILS RELATIVE PERCENT: 0.7 % (ref 0–6)
EPITHELIAL CELLS, UA: ABNORMAL /HPF
GFR AFRICAN AMERICAN: >60
GFR NON-AFRICAN AMERICAN: >60 ML/MIN/1.73
GLUCOSE BLD-MCNC: 102 MG/DL (ref 74–99)
GLUCOSE URINE: NEGATIVE MG/DL
HCT VFR BLD CALC: 50.6 % (ref 34–48)
HEMOGLOBIN: 16.5 G/DL (ref 11.5–15.5)
IMMATURE GRANULOCYTES #: 0.03 E9/L
IMMATURE GRANULOCYTES %: 0.3 % (ref 0–5)
KETONES, URINE: NEGATIVE MG/DL
LACTIC ACID: 1 MMOL/L (ref 0.5–2.2)
LEUKOCYTE ESTERASE, URINE: NEGATIVE
LIPASE: 18 U/L (ref 13–60)
LYMPHOCYTES ABSOLUTE: 1.21 E9/L (ref 1.5–4)
LYMPHOCYTES RELATIVE PERCENT: 13.5 % (ref 20–42)
MCH RBC QN AUTO: 31.6 PG (ref 26–35)
MCHC RBC AUTO-ENTMCNC: 32.6 % (ref 32–34.5)
MCV RBC AUTO: 96.9 FL (ref 80–99.9)
MONOCYTES ABSOLUTE: 0.48 E9/L (ref 0.1–0.95)
MONOCYTES RELATIVE PERCENT: 5.3 % (ref 2–12)
MUCUS: PRESENT
NEUTROPHILS ABSOLUTE: 7.17 E9/L (ref 1.8–7.3)
NEUTROPHILS RELATIVE PERCENT: 79.9 % (ref 43–80)
NITRITE, URINE: NEGATIVE
PDW BLD-RTO: 12.8 FL (ref 11.5–15)
PH UA: 5.5 (ref 5–9)
PLATELET # BLD: 200 E9/L (ref 130–450)
PMV BLD AUTO: 11.3 FL (ref 7–12)
POTASSIUM REFLEX MAGNESIUM: 4.3 MMOL/L (ref 3.5–5)
PROTEIN UA: NEGATIVE MG/DL
RBC # BLD: 5.22 E12/L (ref 3.5–5.5)
RBC UA: ABNORMAL /HPF (ref 0–2)
SODIUM BLD-SCNC: 139 MMOL/L (ref 132–146)
SPECIFIC GRAVITY UA: 1.02 (ref 1–1.03)
TOTAL PROTEIN: 7.7 G/DL (ref 6.4–8.3)
TROPONIN: <0.01 NG/ML (ref 0–0.03)
UROBILINOGEN, URINE: 0.2 E.U./DL
WBC # BLD: 9 E9/L (ref 4.5–11.5)
WBC UA: ABNORMAL /HPF (ref 0–5)

## 2019-07-10 PROCEDURE — 93010 ELECTROCARDIOGRAM REPORT: CPT | Performed by: INTERNAL MEDICINE

## 2019-07-10 PROCEDURE — 81001 URINALYSIS AUTO W/SCOPE: CPT

## 2019-07-10 PROCEDURE — 99284 EMERGENCY DEPT VISIT MOD MDM: CPT

## 2019-07-10 PROCEDURE — 85025 COMPLETE CBC W/AUTO DIFF WBC: CPT

## 2019-07-10 PROCEDURE — 80053 COMPREHEN METABOLIC PANEL: CPT

## 2019-07-10 PROCEDURE — 93005 ELECTROCARDIOGRAM TRACING: CPT | Performed by: EMERGENCY MEDICINE

## 2019-07-10 PROCEDURE — 2580000003 HC RX 258: Performed by: EMERGENCY MEDICINE

## 2019-07-10 PROCEDURE — 36415 COLL VENOUS BLD VENIPUNCTURE: CPT

## 2019-07-10 PROCEDURE — 71045 X-RAY EXAM CHEST 1 VIEW: CPT

## 2019-07-10 PROCEDURE — 83690 ASSAY OF LIPASE: CPT

## 2019-07-10 PROCEDURE — 83605 ASSAY OF LACTIC ACID: CPT

## 2019-07-10 PROCEDURE — 84484 ASSAY OF TROPONIN QUANT: CPT

## 2019-07-10 PROCEDURE — 6370000000 HC RX 637 (ALT 250 FOR IP): Performed by: EMERGENCY MEDICINE

## 2019-07-10 RX ORDER — 0.9 % SODIUM CHLORIDE 0.9 %
1000 INTRAVENOUS SOLUTION INTRAVENOUS ONCE
Status: COMPLETED | OUTPATIENT
Start: 2019-07-10 | End: 2019-07-10

## 2019-07-10 RX ORDER — SUCRALFATE 1 G/1
1 TABLET ORAL 4 TIMES DAILY
Qty: 60 TABLET | Refills: 0 | Status: SHIPPED | OUTPATIENT
Start: 2019-07-10 | End: 2019-10-14

## 2019-07-10 RX ORDER — FAMOTIDINE 20 MG/1
20 TABLET, FILM COATED ORAL 2 TIMES DAILY
Qty: 14 TABLET | Refills: 0 | Status: SHIPPED | OUTPATIENT
Start: 2019-07-10 | End: 2019-10-14

## 2019-07-10 RX ADMIN — SODIUM CHLORIDE 1000 ML: 9 INJECTION, SOLUTION INTRAVENOUS at 15:39

## 2019-07-10 RX ADMIN — LIDOCAINE HYDROCHLORIDE: 20 SOLUTION ORAL; TOPICAL at 15:39

## 2019-07-10 ASSESSMENT — PAIN DESCRIPTION - PAIN TYPE: TYPE: ACUTE PAIN

## 2019-07-10 ASSESSMENT — PAIN - FUNCTIONAL ASSESSMENT: PAIN_FUNCTIONAL_ASSESSMENT: PREVENTS OR INTERFERES WITH MANY ACTIVE NOT PASSIVE ACTIVITIES

## 2019-07-10 ASSESSMENT — PAIN DESCRIPTION - FREQUENCY: FREQUENCY: INTERMITTENT

## 2019-07-10 ASSESSMENT — PAIN SCALES - GENERAL: PAINLEVEL_OUTOF10: 10

## 2019-07-10 ASSESSMENT — PAIN DESCRIPTION - ORIENTATION: ORIENTATION: MID;UPPER

## 2019-07-10 ASSESSMENT — PAIN DESCRIPTION - DESCRIPTORS: DESCRIPTORS: SHARP

## 2019-07-10 ASSESSMENT — PAIN DESCRIPTION - ONSET: ONSET: AWAKENED FROM SLEEP

## 2019-07-10 ASSESSMENT — PAIN DESCRIPTION - PROGRESSION: CLINICAL_PROGRESSION: GRADUALLY WORSENING

## 2019-07-10 ASSESSMENT — PAIN DESCRIPTION - LOCATION: LOCATION: ABDOMEN

## 2019-07-10 NOTE — ED PROVIDER NOTES
HPI:  7/10/19, Time: 3:38 PM         Renetta Reyna is a 48 y.o. female presenting to the ED for pain. Patient states that the abdominal pain has been present intermittently over the last year. The pain began this morning upon awakening. Pain located in the epigastric region. The pain is described as a \"contraction\" the pain is nonradiating. Pain currently rated 10 on 10. Pain is worse with attempting to eat or drink anything. She has had similar pain in the past.  Treatment prior to arrival.  Have a history of GERD in the past.  She does admit to associated nausea, no episodes of emesis. She denies any fevers, chills, chest pain, shortness of breath, dysuria, diarrhea or constipation. ROS:   Pertinent positives and negatives are stated within HPI, all other systems reviewed and are negative.  --------------------------------------------- PAST HISTORY ---------------------------------------------  Past Medical History:  has a past medical history of Cancer ([facility] [address] COPD (chronic obstructive pulmonary disease) ([facility] [address] DJD (degenerative joint disease) of knee, GERD (gastroesophageal reflux disease), Headache, Hiatal hernia, Mixed hyperlipidemia, and SVT (supraventricular tachycardia) ([facility] [address] Past Surgical History:  has a past surgical history that includes back surgery; knee surgery (Left); Tonsillectomy; Tubal ligation; Endometrial ablation; Knee arthroscopy (Right, 05/05/2017); Endometrial ablation; and bone biopsy. Social History:  reports that she has been smoking cigarettes. She has a 30.00 pack-year smoking history. She has never used smokeless tobacco. She reports that she does not drink alcohol or use drugs. Family History: family history includes High Blood Pressure in her father and mother. The patients home medications have been reviewed. Allergies: Cortisone; Medrol [methylprednisolone];  Oxycontin [oxycodone hcl]; and Penicillins    ---------------------------------------------------PHYSICAL EXAM--------------------------------------  Constitutional/General: Alert and oriented x3, well appearing, non toxic in NAD  Head: NC/AT  Eyes: PERRL, EOMI  Mouth: Oropharynx clear, handling secretions, no trismus  Neck: Supple, full ROM, no meningeal signs  Pulmonary: Lungs clear to auscultation bilaterally, no wheezes, rales, or rhonchi. Not in respiratory distress  Cardiovascular:  Regular rate and rhythm, no murmurs, gallops, or rubs. 2+ distal pulses  Abdomen: Soft, tender in the epigastric region, no rebound, rigidity or guarding. Extremities: Moves all extremities x 4. Warm and well perfused  Skin: warm and dry without rash  Neurologic: GCS 15,  Psych: Normal Affect      -------------------------------------------------- RESULTS -------------------------------------------------  I have personally reviewed all laboratory and imaging results for this patient. Results are listed below.      LABS:  Results for orders placed or performed during the hospital encounter of 07/10/19   CBC Auto Differential   Result Value Ref Range    WBC 9.0 4.5 - 11.5 E9/L    RBC 5.22 3.50 - 5.50 E12/L    Hemoglobin 16.5 (H) 11.5 - 15.5 g/dL    Hematocrit 50.6 (H) 34.0 - 48.0 %    MCV 96.9 80.0 - 99.9 fL    MCH 31.6 26.0 - 35.0 pg    MCHC 32.6 32.0 - 34.5 %    RDW 12.8 11.5 - 15.0 fL    Platelets 090 582 - 743 E9/L    MPV 11.3 7.0 - 12.0 fL    Neutrophils % 79.9 43.0 - 80.0 %    Immature Granulocytes % 0.3 0.0 - 5.0 %    Lymphocytes % 13.5 (L) 20.0 - 42.0 %    Monocytes % 5.3 2.0 - 12.0 %    Eosinophils % 0.7 0.0 - 6.0 %    Basophils % 0.3 0.0 - 2.0 %    Neutrophils # 7.17 1.80 - 7.30 E9/L    Immature Granulocytes # 0.03 E9/L    Lymphocytes # 1.21 (L) 1.50 - 4.00 E9/L    Monocytes # 0.48 0.10 - 0.95 E9/L    Eosinophils # 0.06 0.05 - 0.50 E9/L    Basophils # 0.03 0.00 - 0.20 E9/L   Comprehensive Metabolic Panel w/ Reflex to MG   Result Value Ref Range    Sodium 139 132 - 146 mmol/L    Potassium reflex Magnesium 4.3 3.5 - 5.0 mmol/L    Chloride 101 98 - 107 mmol/L    CO2 26 22 - 29 mmol/L    Anion Gap 12 7 - 16 mmol/L    Glucose 102 (H) 74 - 99 mg/dL    BUN 7 6 - 20 mg/dL    CREATININE 0.6 0.5 - 1.0 mg/dL    GFR Non-African American >60 >=60 mL/min/1.73    GFR African American >60     Calcium 9.5 8.6 - 10.2 mg/dL    Total Protein 7.7 6.4 - 8.3 g/dL    Alb 4.7 3.5 - 5.2 g/dL    Total Bilirubin 0.5 0.0 - 1.2 mg/dL    Alkaline Phosphatase 110 (H) 35 - 104 U/L    ALT 24 0 - 32 U/L    AST 20 0 - 31 U/L   Troponin   Result Value Ref Range    Troponin <0.01 0.00 - 0.03 ng/mL   Lactic Acid, Plasma   Result Value Ref Range    Lactic Acid 1.0 0.5 - 2.2 mmol/L   Urinalysis   Result Value Ref Range    Color, UA Yellow Straw/Yellow    Clarity, UA Clear Clear    Glucose, Ur Negative Negative mg/dL    Bilirubin Urine Negative Negative    Ketones, Urine Negative Negative mg/dL    Specific Gravity, UA 1.025 1.005 - 1.030    Blood, Urine TRACE (A) Negative    pH, UA 5.5 5.0 - 9.0    Protein, UA Negative Negative mg/dL    Urobilinogen, Urine 0.2 <2.0 E.U./dL    Nitrite, Urine Negative Negative    Leukocyte Esterase, Urine Negative Negative   Lipase   Result Value Ref Range    Lipase 18 13 - 60 U/L   Microscopic Urinalysis   Result Value Ref Range    Mucus, UA Present     WBC, UA 0-1 0 - 5 /HPF    RBC, UA NONE 0 - 2 /HPF    Epi Cells FEW /HPF    Bacteria, UA FEW (A) /HPF   EKG 12 Lead   Result Value Ref Range    Ventricular Rate 96 BPM    Atrial Rate 96 BPM    P-R Interval 148 ms    QRS Duration 78 ms    Q-T Interval 356 ms    QTc Calculation (Bazett) 449 ms    P Axis 61 degrees    R Axis 18 degrees    T Axis 40 degrees       RADIOLOGY:  Interpreted by Radiologist.  XR CHEST PORTABLE   Final Result   No acute cardiopulmonary disease. EKG:  This EKG is signed and interpreted by me.     Rate: 96  Rhythm: Normal sinus rhythm  Interpretation: Normal sinus rhythm, no acute ischemic multiple bedside re-evaluations, IV medications, cardiac monitoring, continuous pulse oximetry and a personal history and physicial eaxmination    This patient has remained hemodynamically stable during their ED course. Counseling: The emergency provider has spoken with the patient and discussed todays results, in addition to providing specific details for the plan of care and counseling regarding the diagnosis and prognosis. Questions are answered at this time and they are agreeable with the plan.       --------------------------------- IMPRESSION AND DISPOSITION ---------------------------------    IMPRESSION  1. Epigastric pain        DISPOSITION  Disposition: Discharge to home  Patient condition is stable        NOTE: This report was transcribed using voice recognition software.  Every effort was made to ensure accuracy; however, inadvertent computerized transcription errors may be present          Clari Eduardo DO  07/10/19 1649

## 2019-07-17 ENCOUNTER — OFFICE VISIT (OUTPATIENT)
Dept: FAMILY MEDICINE CLINIC | Age: 54
End: 2019-07-17
Payer: COMMERCIAL

## 2019-07-17 VITALS
OXYGEN SATURATION: 96 % | BODY MASS INDEX: 28.19 KG/M2 | DIASTOLIC BLOOD PRESSURE: 82 MMHG | WEIGHT: 186 LBS | HEART RATE: 96 BPM | TEMPERATURE: 98.4 F | HEIGHT: 68 IN | RESPIRATION RATE: 14 BRPM | SYSTOLIC BLOOD PRESSURE: 124 MMHG

## 2019-07-17 DIAGNOSIS — K80.20 CALCULUS OF GALLBLADDER WITHOUT CHOLECYSTITIS WITHOUT OBSTRUCTION: Primary | ICD-10-CM

## 2019-07-17 DIAGNOSIS — K80.50 BILIARY COLIC: ICD-10-CM

## 2019-07-17 PROCEDURE — G8419 CALC BMI OUT NRM PARAM NOF/U: HCPCS | Performed by: FAMILY MEDICINE

## 2019-07-17 PROCEDURE — 99213 OFFICE O/P EST LOW 20 MIN: CPT | Performed by: FAMILY MEDICINE

## 2019-07-17 PROCEDURE — 4004F PT TOBACCO SCREEN RCVD TLK: CPT | Performed by: FAMILY MEDICINE

## 2019-07-17 PROCEDURE — 3017F COLORECTAL CA SCREEN DOC REV: CPT | Performed by: FAMILY MEDICINE

## 2019-07-17 PROCEDURE — G8427 DOCREV CUR MEDS BY ELIG CLIN: HCPCS | Performed by: FAMILY MEDICINE

## 2019-07-17 ASSESSMENT — ENCOUNTER SYMPTOMS
EYE DISCHARGE: 0
ABDOMINAL DISTENTION: 1
ABDOMINAL PAIN: 1

## 2019-07-30 ENCOUNTER — HOSPITAL ENCOUNTER (OUTPATIENT)
Dept: NUCLEAR MEDICINE | Age: 54
Discharge: HOME | End: 2019-07-30
Payer: COMMERCIAL

## 2019-07-30 VITALS — BODY MASS INDEX: 28.28 KG/M2 | WEIGHT: 186 LBS

## 2019-07-30 DIAGNOSIS — K80.20 CALCULUS OF GALLBLADDER WITHOUT CHOLECYSTITIS WITHOUT OBSTRUCTION: ICD-10-CM

## 2019-07-30 DIAGNOSIS — K80.50 BILIARY COLIC: ICD-10-CM

## 2019-07-30 PROCEDURE — A9537 TC99M MEBROFENIN: HCPCS | Performed by: RADIOLOGY

## 2019-07-30 PROCEDURE — 78226 HEPATOBILIARY SYSTEM IMAGING: CPT

## 2019-07-30 PROCEDURE — 3430000000 HC RX DIAGNOSTIC RADIOPHARMACEUTICAL: Performed by: RADIOLOGY

## 2019-07-30 RX ADMIN — Medication 6 MILLICURIE: at 09:57

## 2019-10-09 DIAGNOSIS — E55.9 VITAMIN D DEFICIENCY: ICD-10-CM

## 2019-10-09 DIAGNOSIS — K21.9 GASTROESOPHAGEAL REFLUX DISEASE WITHOUT ESOPHAGITIS: ICD-10-CM

## 2019-10-09 DIAGNOSIS — E78.2 MIXED HYPERLIPIDEMIA: ICD-10-CM

## 2019-10-09 RX ORDER — ACETAMINOPHEN 160 MG
1 TABLET,DISINTEGRATING ORAL DAILY
Qty: 30 CAPSULE | Refills: 3 | Status: SHIPPED
Start: 2019-10-09 | End: 2020-06-05 | Stop reason: SDUPTHER

## 2019-10-09 RX ORDER — ATORVASTATIN CALCIUM 20 MG/1
20 TABLET, FILM COATED ORAL DAILY
Qty: 30 TABLET | Refills: 3 | Status: SHIPPED
Start: 2019-10-09 | End: 2020-06-05 | Stop reason: SDUPTHER

## 2019-10-09 RX ORDER — LANSOPRAZOLE 30 MG/1
30 CAPSULE, DELAYED RELEASE ORAL DAILY
Qty: 90 CAPSULE | Refills: 0 | Status: SHIPPED | OUTPATIENT
Start: 2019-10-09 | End: 2019-10-21

## 2019-10-14 ENCOUNTER — OFFICE VISIT (OUTPATIENT)
Dept: FAMILY MEDICINE CLINIC | Age: 54
End: 2019-10-14
Payer: MEDICARE

## 2019-10-14 VITALS
TEMPERATURE: 97.8 F | DIASTOLIC BLOOD PRESSURE: 68 MMHG | HEART RATE: 79 BPM | RESPIRATION RATE: 16 BRPM | SYSTOLIC BLOOD PRESSURE: 120 MMHG | WEIGHT: 188 LBS | BODY MASS INDEX: 28.49 KG/M2 | HEIGHT: 68 IN | OXYGEN SATURATION: 98 %

## 2019-10-14 DIAGNOSIS — I47.1 PAROXYSMAL SVT (SUPRAVENTRICULAR TACHYCARDIA) (HCC): ICD-10-CM

## 2019-10-14 DIAGNOSIS — K21.9 GASTROESOPHAGEAL REFLUX DISEASE WITHOUT ESOPHAGITIS: ICD-10-CM

## 2019-10-14 DIAGNOSIS — Z12.11 SCREENING FOR MALIGNANT NEOPLASM OF COLON: Primary | ICD-10-CM

## 2019-10-14 DIAGNOSIS — J44.9 CHRONIC OBSTRUCTIVE PULMONARY DISEASE, UNSPECIFIED COPD TYPE (HCC): ICD-10-CM

## 2019-10-14 DIAGNOSIS — K80.20 CALCULUS OF GALLBLADDER WITHOUT CHOLECYSTITIS WITHOUT OBSTRUCTION: ICD-10-CM

## 2019-10-14 PROCEDURE — G8926 SPIRO NO PERF OR DOC: HCPCS | Performed by: FAMILY MEDICINE

## 2019-10-14 PROCEDURE — 99214 OFFICE O/P EST MOD 30 MIN: CPT | Performed by: FAMILY MEDICINE

## 2019-10-14 PROCEDURE — G8484 FLU IMMUNIZE NO ADMIN: HCPCS | Performed by: FAMILY MEDICINE

## 2019-10-14 PROCEDURE — G8427 DOCREV CUR MEDS BY ELIG CLIN: HCPCS | Performed by: FAMILY MEDICINE

## 2019-10-14 PROCEDURE — 3017F COLORECTAL CA SCREEN DOC REV: CPT | Performed by: FAMILY MEDICINE

## 2019-10-14 PROCEDURE — 3023F SPIROM DOC REV: CPT | Performed by: FAMILY MEDICINE

## 2019-10-14 PROCEDURE — G8419 CALC BMI OUT NRM PARAM NOF/U: HCPCS | Performed by: FAMILY MEDICINE

## 2019-10-14 PROCEDURE — 4004F PT TOBACCO SCREEN RCVD TLK: CPT | Performed by: FAMILY MEDICINE

## 2019-10-14 RX ORDER — PANTOPRAZOLE SODIUM 40 MG/1
40 TABLET, DELAYED RELEASE ORAL
Qty: 30 TABLET | Refills: 0 | Status: SHIPPED | OUTPATIENT
Start: 2019-10-14 | End: 2020-01-06 | Stop reason: SDUPTHER

## 2019-10-14 ASSESSMENT — ENCOUNTER SYMPTOMS
COUGH: 1
DIARRHEA: 0
CONSTIPATION: 0
BLOOD IN STOOL: 0
WHEEZING: 0
ABDOMINAL PAIN: 1

## 2019-10-15 ENCOUNTER — TELEPHONE (OUTPATIENT)
Dept: SURGERY | Age: 54
End: 2019-10-15

## 2019-10-17 ENCOUNTER — OFFICE VISIT (OUTPATIENT)
Dept: SURGERY | Age: 54
End: 2019-10-17
Payer: MEDICARE

## 2019-10-17 ENCOUNTER — TELEPHONE (OUTPATIENT)
Dept: SURGERY | Age: 54
End: 2019-10-17

## 2019-10-17 VITALS
WEIGHT: 189 LBS | SYSTOLIC BLOOD PRESSURE: 120 MMHG | DIASTOLIC BLOOD PRESSURE: 72 MMHG | TEMPERATURE: 97.5 F | OXYGEN SATURATION: 97 % | HEART RATE: 89 BPM | HEIGHT: 68 IN | BODY MASS INDEX: 28.64 KG/M2

## 2019-10-17 DIAGNOSIS — K44.9 HIATAL HERNIA: ICD-10-CM

## 2019-10-17 DIAGNOSIS — K80.20 SYMPTOMATIC CHOLELITHIASIS: Primary | ICD-10-CM

## 2019-10-17 PROCEDURE — 4004F PT TOBACCO SCREEN RCVD TLK: CPT | Performed by: SURGERY

## 2019-10-17 PROCEDURE — G8427 DOCREV CUR MEDS BY ELIG CLIN: HCPCS | Performed by: SURGERY

## 2019-10-17 PROCEDURE — G8419 CALC BMI OUT NRM PARAM NOF/U: HCPCS | Performed by: SURGERY

## 2019-10-17 PROCEDURE — 3017F COLORECTAL CA SCREEN DOC REV: CPT | Performed by: SURGERY

## 2019-10-17 PROCEDURE — 99204 OFFICE O/P NEW MOD 45 MIN: CPT | Performed by: SURGERY

## 2019-10-17 PROCEDURE — G8484 FLU IMMUNIZE NO ADMIN: HCPCS | Performed by: SURGERY

## 2019-10-18 ENCOUNTER — TELEPHONE (OUTPATIENT)
Dept: SURGERY | Age: 54
End: 2019-10-18

## 2019-10-21 ENCOUNTER — OFFICE VISIT (OUTPATIENT)
Dept: CARDIOLOGY CLINIC | Age: 54
End: 2019-10-21
Payer: MEDICARE

## 2019-10-21 VITALS
HEART RATE: 83 BPM | SYSTOLIC BLOOD PRESSURE: 118 MMHG | BODY MASS INDEX: 28.04 KG/M2 | WEIGHT: 185 LBS | HEIGHT: 68 IN | DIASTOLIC BLOOD PRESSURE: 62 MMHG

## 2019-10-21 DIAGNOSIS — E78.00 PURE HYPERCHOLESTEROLEMIA: ICD-10-CM

## 2019-10-21 DIAGNOSIS — R00.2 PALPITATIONS: Primary | ICD-10-CM

## 2019-10-21 DIAGNOSIS — I47.1 PAROXYSMAL SVT (SUPRAVENTRICULAR TACHYCARDIA) (HCC): ICD-10-CM

## 2019-10-21 DIAGNOSIS — J44.9 CHRONIC OBSTRUCTIVE PULMONARY DISEASE, UNSPECIFIED COPD TYPE (HCC): ICD-10-CM

## 2019-10-21 DIAGNOSIS — Z01.810 PREOPERATIVE CARDIOVASCULAR EXAMINATION: ICD-10-CM

## 2019-10-21 PROCEDURE — 3023F SPIROM DOC REV: CPT | Performed by: INTERNAL MEDICINE

## 2019-10-21 PROCEDURE — G8419 CALC BMI OUT NRM PARAM NOF/U: HCPCS | Performed by: INTERNAL MEDICINE

## 2019-10-21 PROCEDURE — 99214 OFFICE O/P EST MOD 30 MIN: CPT | Performed by: INTERNAL MEDICINE

## 2019-10-21 PROCEDURE — 4004F PT TOBACCO SCREEN RCVD TLK: CPT | Performed by: INTERNAL MEDICINE

## 2019-10-21 PROCEDURE — 3017F COLORECTAL CA SCREEN DOC REV: CPT | Performed by: INTERNAL MEDICINE

## 2019-10-21 PROCEDURE — G8484 FLU IMMUNIZE NO ADMIN: HCPCS | Performed by: INTERNAL MEDICINE

## 2019-10-21 PROCEDURE — G8926 SPIRO NO PERF OR DOC: HCPCS | Performed by: INTERNAL MEDICINE

## 2019-10-21 PROCEDURE — G8427 DOCREV CUR MEDS BY ELIG CLIN: HCPCS | Performed by: INTERNAL MEDICINE

## 2019-10-21 PROCEDURE — 93000 ELECTROCARDIOGRAM COMPLETE: CPT | Performed by: INTERNAL MEDICINE

## 2019-10-22 ENCOUNTER — PREP FOR PROCEDURE (OUTPATIENT)
Dept: SURGERY | Age: 54
End: 2019-10-22

## 2019-10-22 RX ORDER — SODIUM CHLORIDE 0.9 % (FLUSH) 0.9 %
10 SYRINGE (ML) INJECTION EVERY 12 HOURS SCHEDULED
Status: CANCELLED | OUTPATIENT
Start: 2019-10-22

## 2019-10-22 RX ORDER — SODIUM CHLORIDE 0.9 % (FLUSH) 0.9 %
10 SYRINGE (ML) INJECTION PRN
Status: CANCELLED | OUTPATIENT
Start: 2019-10-22

## 2019-10-22 RX ORDER — SODIUM CHLORIDE, SODIUM LACTATE, POTASSIUM CHLORIDE, CALCIUM CHLORIDE 600; 310; 30; 20 MG/100ML; MG/100ML; MG/100ML; MG/100ML
INJECTION, SOLUTION INTRAVENOUS CONTINUOUS
Status: CANCELLED | OUTPATIENT
Start: 2019-10-22

## 2020-01-06 ENCOUNTER — OFFICE VISIT (OUTPATIENT)
Dept: FAMILY MEDICINE CLINIC | Age: 55
End: 2020-01-06
Payer: MEDICARE

## 2020-01-06 VITALS
BODY MASS INDEX: 29.01 KG/M2 | RESPIRATION RATE: 16 BRPM | HEIGHT: 68 IN | SYSTOLIC BLOOD PRESSURE: 112 MMHG | HEART RATE: 84 BPM | TEMPERATURE: 97.7 F | DIASTOLIC BLOOD PRESSURE: 70 MMHG | OXYGEN SATURATION: 98 % | WEIGHT: 191.4 LBS

## 2020-01-06 LAB
INFLUENZA A ANTIGEN, POC: NORMAL
INFLUENZA B ANTIGEN, POC: NORMAL

## 2020-01-06 PROCEDURE — 4004F PT TOBACCO SCREEN RCVD TLK: CPT | Performed by: NURSE PRACTITIONER

## 2020-01-06 PROCEDURE — 99214 OFFICE O/P EST MOD 30 MIN: CPT | Performed by: NURSE PRACTITIONER

## 2020-01-06 PROCEDURE — 87804 INFLUENZA ASSAY W/OPTIC: CPT | Performed by: NURSE PRACTITIONER

## 2020-01-06 PROCEDURE — G8427 DOCREV CUR MEDS BY ELIG CLIN: HCPCS | Performed by: NURSE PRACTITIONER

## 2020-01-06 PROCEDURE — 3017F COLORECTAL CA SCREEN DOC REV: CPT | Performed by: NURSE PRACTITIONER

## 2020-01-06 PROCEDURE — 3023F SPIROM DOC REV: CPT | Performed by: NURSE PRACTITIONER

## 2020-01-06 PROCEDURE — G8419 CALC BMI OUT NRM PARAM NOF/U: HCPCS | Performed by: NURSE PRACTITIONER

## 2020-01-06 PROCEDURE — G8926 SPIRO NO PERF OR DOC: HCPCS | Performed by: NURSE PRACTITIONER

## 2020-01-06 PROCEDURE — G8484 FLU IMMUNIZE NO ADMIN: HCPCS | Performed by: NURSE PRACTITIONER

## 2020-01-06 RX ORDER — PANTOPRAZOLE SODIUM 40 MG/1
40 TABLET, DELAYED RELEASE ORAL
Qty: 30 TABLET | Refills: 3 | Status: SHIPPED
Start: 2020-01-06 | End: 2020-07-22 | Stop reason: SDUPTHER

## 2020-01-06 RX ORDER — DEXTROMETHORPHAN HYDROBROMIDE AND PROMETHAZINE HYDROCHLORIDE 15; 6.25 MG/5ML; MG/5ML
5 SYRUP ORAL 4 TIMES DAILY PRN
Qty: 120 ML | Refills: 0 | Status: SHIPPED | OUTPATIENT
Start: 2020-01-06 | End: 2020-01-16

## 2020-01-06 RX ORDER — AZITHROMYCIN 250 MG/1
TABLET, FILM COATED ORAL
Qty: 1 PACKET | Refills: 0 | Status: SHIPPED
Start: 2020-01-06 | End: 2020-03-05

## 2020-01-06 ASSESSMENT — ENCOUNTER SYMPTOMS
SINUS PRESSURE: 1
WHEEZING: 0
DIARRHEA: 0
COUGH: 1
NAUSEA: 0
SHORTNESS OF BREATH: 1
CONSTIPATION: 0
VOMITING: 0
SORE THROAT: 1
SINUS PAIN: 1

## 2020-01-06 ASSESSMENT — PATIENT HEALTH QUESTIONNAIRE - PHQ9
2. FEELING DOWN, DEPRESSED OR HOPELESS: 0
SUM OF ALL RESPONSES TO PHQ QUESTIONS 1-9: 0
SUM OF ALL RESPONSES TO PHQ9 QUESTIONS 1 & 2: 0
SUM OF ALL RESPONSES TO PHQ QUESTIONS 1-9: 0
1. LITTLE INTEREST OR PLEASURE IN DOING THINGS: 0

## 2020-01-06 NOTE — PROGRESS NOTES
take 1 tab (250 mg) on days 2 through 5.  -     POCT Influenza A/B Antigen (BD Veritor)  -Contact office if symptoms do not improve as expected or worsen. Gastroesophageal reflux disease without esophagitis  -     pantoprazole (PROTONIX) 40 MG tablet; Take 1 tablet by mouth every morning (before breakfast)  -The current medical regimen is effective;  continue present plan and medications. Calculus of gallbladder without cholecystitis without obstruction  -     pantoprazole (PROTONIX) 40 MG tablet;  Take 1 tablet by mouth every morning (before breakfast)    Paroxysmal SVT (supraventricular tachycardia) ([facility])  -beta blocker prn  -recent cardiology work up reviewed    [facility], [facility]  -encouraged smoking cessation  -will get PFTs and pulmonology consult after she has recovered from surgery          Greater than 25  Minutes was spent with patient and more than 50% of the time was spent face to facecounseling and educating regarding diagnoses

## 2020-01-09 ENCOUNTER — TELEPHONE (OUTPATIENT)
Dept: SURGERY | Age: 55
End: 2020-01-09

## 2020-01-09 NOTE — TELEPHONE ENCOUNTER
Pre testing called in informing us of pt on Zpak for Sinobronchitis. This nurse contacted Pt. Pt c/o shortness of breath w/ coughing. Pt has been taking Zpak for 4 days, and supposed to take last dose tomorrow. Pt advised procedure could not be completed while she is not feeling well. Sidra Moncada MA will be advised to set pt up with another procedure date. Ayesha from [facility] Surgery scheduling informed and cancelled pt for tomorrow. Will route encounter to .

## 2020-01-27 ENCOUNTER — PREP FOR PROCEDURE (OUTPATIENT)
Dept: SURGERY | Age: 55
End: 2020-01-27

## 2020-01-27 RX ORDER — SODIUM CHLORIDE 0.9 % (FLUSH) 0.9 %
10 SYRINGE (ML) INJECTION EVERY 12 HOURS SCHEDULED
Status: CANCELLED | OUTPATIENT
Start: 2020-01-27

## 2020-01-27 RX ORDER — SODIUM CHLORIDE 0.9 % (FLUSH) 0.9 %
10 SYRINGE (ML) INJECTION PRN
Status: CANCELLED | OUTPATIENT
Start: 2020-01-27

## 2020-01-27 RX ORDER — SODIUM CHLORIDE, SODIUM LACTATE, POTASSIUM CHLORIDE, CALCIUM CHLORIDE 600; 310; 30; 20 MG/100ML; MG/100ML; MG/100ML; MG/100ML
INJECTION, SOLUTION INTRAVENOUS CONTINUOUS
Status: CANCELLED | OUTPATIENT
Start: 2020-01-27

## 2020-02-19 ENCOUNTER — TELEPHONE (OUTPATIENT)
Dept: SURGERY | Age: 55
End: 2020-02-19

## 2020-02-19 NOTE — TELEPHONE ENCOUNTER
Patient phoned the office requesting to reschedule surgery to 3.6.2020 due to transportation. Call placed to surgery scheduling to reschedule surgery. I spoke with Sami Booth. Patients post op follow up appointment also rescheduled.   Electronically signed by Celestine Ortiz on 2/19/20 at 3:31 PM

## 2020-03-05 ENCOUNTER — ANESTHESIA EVENT (OUTPATIENT)
Dept: OPERATING ROOM | Age: 55
End: 2020-03-05
Payer: MEDICARE

## 2020-03-05 ENCOUNTER — HOSPITAL ENCOUNTER (OUTPATIENT)
Dept: PREADMISSION TESTING | Age: 55
Discharge: HOME | End: 2020-03-05
Payer: MEDICARE

## 2020-03-05 LAB
ANION GAP SERPL CALCULATED.3IONS-SCNC: 14 MMOL/L (ref 7–16)
BUN BLDV-MCNC: 9 MG/DL (ref 6–20)
CALCIUM SERPL-MCNC: 9.5 MG/DL (ref 8.6–10.2)
CHLORIDE BLD-SCNC: 102 MMOL/L (ref 98–107)
CO2: 24 MMOL/L (ref 22–29)
CREAT SERPL-MCNC: 0.6 MG/DL (ref 0.5–1)
GFR AFRICAN AMERICAN: >60
GFR NON-AFRICAN AMERICAN: >60 ML/MIN/1.73
GLUCOSE BLD-MCNC: 93 MG/DL (ref 74–99)
HCT VFR BLD CALC: 45.3 % (ref 34–48)
HEMOGLOBIN: 15.1 G/DL (ref 11.5–15.5)
MCH RBC QN AUTO: 31.7 PG (ref 26–35)
MCHC RBC AUTO-ENTMCNC: 33.3 % (ref 32–34.5)
MCV RBC AUTO: 95 FL (ref 80–99.9)
PDW BLD-RTO: 12.4 FL (ref 11.5–15)
PLATELET # BLD: 178 E9/L (ref 130–450)
PMV BLD AUTO: 11 FL (ref 7–12)
POTASSIUM SERPL-SCNC: 4.1 MMOL/L (ref 3.5–5)
RBC # BLD: 4.77 E12/L (ref 3.5–5.5)
SODIUM BLD-SCNC: 140 MMOL/L (ref 132–146)
WBC # BLD: 4.5 E9/L (ref 4.5–11.5)

## 2020-03-05 PROCEDURE — 93005 ELECTROCARDIOGRAM TRACING: CPT | Performed by: ANESTHESIOLOGY

## 2020-03-05 PROCEDURE — 85027 COMPLETE CBC AUTOMATED: CPT

## 2020-03-05 PROCEDURE — 80048 BASIC METABOLIC PNL TOTAL CA: CPT

## 2020-03-05 PROCEDURE — 36415 COLL VENOUS BLD VENIPUNCTURE: CPT

## 2020-03-05 NOTE — PROGRESS NOTES
Severiano Maltos                                                                                                                     PRE OP INSTRUCTIONS FOR  Andie Julien Petuanrocky        Date: 3/5/2020    Date and time of surgery: 3/6/20  Arrival Time: [facility] will call time between 5-7 pm.     1. Do not eat or drink anything after 12 midnight prior to surgery. This includes no water, chewing gum, mints or ice chips. 2. Take the following pills with a small sip of water on the morning of Surgery: Metoprolol      3. Diabetics may take evening dose of insulin but none after midnight. If you feel symptomatic or low blood sugar take 1-2 ounces of apple juice only. 4. Aspirin, Ibuprofen, Advil, Naproxen, Vitamin E and other Anti-inflammatory products should be stopped  before surgery  as directed by your physician. 5. Check with your Doctor regarding stopping Plavix, Coumadin, Lovenox, Fragmin or other blood thinners. 6. Do not smoke,use illicit drugs and do not drink any alcoholic beverages 24 hours prior to surgery. 7. You may brush your teeth and gargle the morning of surgery. DO NOT SWALLOW WATER    8. You MUST make arrangements for a responsible adult to take you home after your surgery. You will not be allowed to leave alone or drive yourself home. It is strongly suggested someone stay with you the first 24 hrs. Your surgery will be cancelled if you do not have a ride home. 9. A parent/legal guardian must accompany a child scheduled for surgery and plan to stay at the hospital until the child is discharged. Please do not bring other children with you. 10. Please wear simple, loose fitting clothing to the hospital.  Pratibha Stephensoniden not bring valuables (money, credit cards, checkbooks, etc.) Do not wear any makeup (including no eye makeup) or nail polish on your fingers or toes. 11. DO NOT wear any jewelry or piercings on day of surgery. All body piercing jewelry must be removed. 12.  Shower the night before surgery with _X__Antibacterial soap ___Hibiclens. 15. Remember to bring Blood Bank bracelet to the hospital on the day of surgery. 14. If you have a Living Will and Durable Power of  for Healthcare, please bring in a copy. 15. If appropriate bring crutches, inspirex, etc... 12. Notify your Surgeon if you develop any illness between now and surgery time, cough, cold, fever, sore throat, nausea, vomiting, etc.  Please notify your surgeon if you experience dizziness, shortness of breath or blurred vision between now & the time of your surgery. 17. If you have _X_dentures, they will be removed before going to the OR; we will provide you a container. If you wear _X__contact lenses or ___glasses, they will be removed; please bring a case for them. 18. To provide excellent care visitors will be limited to one in the room at any given time. 19. Please bring picture ID and insurance card. 20. Sleep apnea patients need to bring CPAP to hospital on day of surgery. 21. Visit our web site for additional information: [email or web address]    22. During flu season no children under the age of 15 are permitted in the hospital for the safety of all patients. 23. Other Come in through main lobby, go to information desk. Please call pre admission testing if you have any further questions.    [address] [facility]     [address]

## 2020-03-06 ENCOUNTER — ANESTHESIA (OUTPATIENT)
Dept: OPERATING ROOM | Age: 55
End: 2020-03-06
Payer: MEDICARE

## 2020-03-06 ENCOUNTER — HOSPITAL ENCOUNTER (OUTPATIENT)
Age: 55
Setting detail: OUTPATIENT SURGERY
Discharge: HOME | End: 2020-03-06
Attending: SURGERY | Admitting: SURGERY
Payer: MEDICARE

## 2020-03-06 VITALS
SYSTOLIC BLOOD PRESSURE: 120 MMHG | OXYGEN SATURATION: 93 % | WEIGHT: 194 LBS | BODY MASS INDEX: 29.4 KG/M2 | HEART RATE: 80 BPM | DIASTOLIC BLOOD PRESSURE: 68 MMHG | RESPIRATION RATE: 16 BRPM | TEMPERATURE: 96.6 F | HEIGHT: 68 IN

## 2020-03-06 VITALS
TEMPERATURE: 96.4 F | SYSTOLIC BLOOD PRESSURE: 103 MMHG | OXYGEN SATURATION: 96 % | RESPIRATION RATE: 1 BRPM | DIASTOLIC BLOOD PRESSURE: 62 MMHG

## 2020-03-06 LAB
EKG ATRIAL RATE: 74 BPM
EKG P AXIS: 59 DEGREES
EKG P-R INTERVAL: 166 MS
EKG Q-T INTERVAL: 406 MS
EKG QRS DURATION: 82 MS
EKG QTC CALCULATION (BAZETT): 450 MS
EKG R AXIS: 11 DEGREES
EKG T AXIS: 20 DEGREES
EKG VENTRICULAR RATE: 74 BPM
HCG(URINE) PREGNANCY TEST: NEGATIVE

## 2020-03-06 PROCEDURE — 6360000002 HC RX W HCPCS

## 2020-03-06 PROCEDURE — 3600000004 HC SURGERY LEVEL 4 BASE: Performed by: SURGERY

## 2020-03-06 PROCEDURE — 6360000002 HC RX W HCPCS: Performed by: SURGERY

## 2020-03-06 PROCEDURE — 3600000014 HC SURGERY LEVEL 4 ADDTL 15MIN: Performed by: SURGERY

## 2020-03-06 PROCEDURE — 7100000011 HC PHASE II RECOVERY - ADDTL 15 MIN: Performed by: SURGERY

## 2020-03-06 PROCEDURE — 7100000010 HC PHASE II RECOVERY - FIRST 15 MIN: Performed by: SURGERY

## 2020-03-06 PROCEDURE — 3700000000 HC ANESTHESIA ATTENDED CARE: Performed by: SURGERY

## 2020-03-06 PROCEDURE — 2720000010 HC SURG SUPPLY STERILE: Performed by: SURGERY

## 2020-03-06 PROCEDURE — 88304 TISSUE EXAM BY PATHOLOGIST: CPT

## 2020-03-06 PROCEDURE — 47562 LAPAROSCOPIC CHOLECYSTECTOMY: CPT | Performed by: SURGERY

## 2020-03-06 PROCEDURE — 7100000000 HC PACU RECOVERY - FIRST 15 MIN: Performed by: SURGERY

## 2020-03-06 PROCEDURE — 2500000003 HC RX 250 WO HCPCS

## 2020-03-06 PROCEDURE — 2580000003 HC RX 258: Performed by: SURGERY

## 2020-03-06 PROCEDURE — 43235 EGD DIAGNOSTIC BRUSH WASH: CPT | Performed by: SURGERY

## 2020-03-06 PROCEDURE — 3700000001 HC ADD 15 MINUTES (ANESTHESIA): Performed by: SURGERY

## 2020-03-06 PROCEDURE — 2709999900 HC NON-CHARGEABLE SUPPLY: Performed by: SURGERY

## 2020-03-06 PROCEDURE — 6370000000 HC RX 637 (ALT 250 FOR IP): Performed by: ANESTHESIOLOGY

## 2020-03-06 PROCEDURE — 81025 URINE PREGNANCY TEST: CPT

## 2020-03-06 PROCEDURE — 93010 ELECTROCARDIOGRAM REPORT: CPT | Performed by: INTERNAL MEDICINE

## 2020-03-06 PROCEDURE — 6360000002 HC RX W HCPCS: Performed by: ANESTHESIOLOGY

## 2020-03-06 PROCEDURE — 7100000001 HC PACU RECOVERY - ADDTL 15 MIN: Performed by: SURGERY

## 2020-03-06 RX ORDER — SODIUM CHLORIDE 0.9 % (FLUSH) 0.9 %
10 SYRINGE (ML) INJECTION PRN
Status: DISCONTINUED | OUTPATIENT
Start: 2020-03-06 | End: 2020-03-06 | Stop reason: HOSPADM

## 2020-03-06 RX ORDER — SODIUM CHLORIDE 0.9 % (FLUSH) 0.9 %
10 SYRINGE (ML) INJECTION EVERY 12 HOURS SCHEDULED
Status: DISCONTINUED | OUTPATIENT
Start: 2020-03-06 | End: 2020-03-06 | Stop reason: HOSPADM

## 2020-03-06 RX ORDER — ONDANSETRON 2 MG/ML
INJECTION INTRAMUSCULAR; INTRAVENOUS PRN
Status: DISCONTINUED | OUTPATIENT
Start: 2020-03-06 | End: 2020-03-06 | Stop reason: SDUPTHER

## 2020-03-06 RX ORDER — IBUPROFEN 800 MG/1
800 TABLET ORAL EVERY 6 HOURS PRN
Qty: 20 TABLET | Refills: 0 | Status: SHIPPED | OUTPATIENT
Start: 2020-03-06 | End: 2021-06-21

## 2020-03-06 RX ORDER — MIDAZOLAM HYDROCHLORIDE 1 MG/ML
INJECTION INTRAMUSCULAR; INTRAVENOUS PRN
Status: DISCONTINUED | OUTPATIENT
Start: 2020-03-06 | End: 2020-03-06 | Stop reason: SDUPTHER

## 2020-03-06 RX ORDER — FENTANYL CITRATE 50 UG/ML
INJECTION, SOLUTION INTRAMUSCULAR; INTRAVENOUS PRN
Status: DISCONTINUED | OUTPATIENT
Start: 2020-03-06 | End: 2020-03-06 | Stop reason: SDUPTHER

## 2020-03-06 RX ORDER — CEFAZOLIN SODIUM 2 G/50ML
2 SOLUTION INTRAVENOUS
Status: COMPLETED | OUTPATIENT
Start: 2020-03-06 | End: 2020-03-06

## 2020-03-06 RX ORDER — FENTANYL CITRATE 50 UG/ML
25 INJECTION, SOLUTION INTRAMUSCULAR; INTRAVENOUS EVERY 5 MIN PRN
Status: DISCONTINUED | OUTPATIENT
Start: 2020-03-06 | End: 2020-03-06 | Stop reason: HOSPADM

## 2020-03-06 RX ORDER — ROCURONIUM BROMIDE 10 MG/ML
INJECTION, SOLUTION INTRAVENOUS PRN
Status: DISCONTINUED | OUTPATIENT
Start: 2020-03-06 | End: 2020-03-06 | Stop reason: SDUPTHER

## 2020-03-06 RX ORDER — HYDROMORPHONE HYDROCHLORIDE 1 MG/ML
0.5 INJECTION, SOLUTION INTRAMUSCULAR; INTRAVENOUS; SUBCUTANEOUS EVERY 5 MIN PRN
Status: DISCONTINUED | OUTPATIENT
Start: 2020-03-06 | End: 2020-03-06 | Stop reason: HOSPADM

## 2020-03-06 RX ORDER — HYDROCODONE BITARTRATE AND ACETAMINOPHEN 5; 325 MG/1; MG/1
1 TABLET ORAL EVERY 6 HOURS PRN
Status: DISCONTINUED | OUTPATIENT
Start: 2020-03-06 | End: 2020-03-06 | Stop reason: HOSPADM

## 2020-03-06 RX ORDER — MEPERIDINE HYDROCHLORIDE 50 MG/ML
12.5 INJECTION INTRAMUSCULAR; INTRAVENOUS; SUBCUTANEOUS EVERY 5 MIN PRN
Status: COMPLETED | OUTPATIENT
Start: 2020-03-06 | End: 2020-03-06

## 2020-03-06 RX ORDER — PROPOFOL 10 MG/ML
INJECTION, EMULSION INTRAVENOUS PRN
Status: DISCONTINUED | OUTPATIENT
Start: 2020-03-06 | End: 2020-03-06 | Stop reason: SDUPTHER

## 2020-03-06 RX ORDER — SODIUM CHLORIDE, SODIUM LACTATE, POTASSIUM CHLORIDE, CALCIUM CHLORIDE 600; 310; 30; 20 MG/100ML; MG/100ML; MG/100ML; MG/100ML
INJECTION, SOLUTION INTRAVENOUS CONTINUOUS
Status: DISCONTINUED | OUTPATIENT
Start: 2020-03-06 | End: 2020-03-06 | Stop reason: HOSPADM

## 2020-03-06 RX ORDER — MEPERIDINE HYDROCHLORIDE 25 MG/ML
INJECTION INTRAMUSCULAR; INTRAVENOUS; SUBCUTANEOUS
Status: DISCONTINUED
Start: 2020-03-06 | End: 2020-03-06 | Stop reason: HOSPADM

## 2020-03-06 RX ORDER — DEXAMETHASONE SODIUM PHOSPHATE 10 MG/ML
INJECTION, SOLUTION INTRAMUSCULAR; INTRAVENOUS PRN
Status: DISCONTINUED | OUTPATIENT
Start: 2020-03-06 | End: 2020-03-06 | Stop reason: SDUPTHER

## 2020-03-06 RX ORDER — HYDROCODONE BITARTRATE AND ACETAMINOPHEN 5; 325 MG/1; MG/1
1 TABLET ORAL EVERY 4 HOURS PRN
Qty: 18 TABLET | Refills: 0 | Status: SHIPPED | OUTPATIENT
Start: 2020-03-06 | End: 2020-03-09

## 2020-03-06 RX ORDER — MORPHINE SULFATE 2 MG/ML
2 INJECTION, SOLUTION INTRAMUSCULAR; INTRAVENOUS EVERY 5 MIN PRN
Status: DISCONTINUED | OUTPATIENT
Start: 2020-03-06 | End: 2020-03-06 | Stop reason: HOSPADM

## 2020-03-06 RX ORDER — LIDOCAINE HYDROCHLORIDE 20 MG/ML
INJECTION, SOLUTION INTRAVENOUS PRN
Status: DISCONTINUED | OUTPATIENT
Start: 2020-03-06 | End: 2020-03-06 | Stop reason: SDUPTHER

## 2020-03-06 RX ADMIN — HYDROMORPHONE HYDROCHLORIDE 0.5 MG: 1 INJECTION, SOLUTION INTRAMUSCULAR; INTRAVENOUS; SUBCUTANEOUS at 08:51

## 2020-03-06 RX ADMIN — ROCURONIUM BROMIDE 35 MG: 10 SOLUTION INTRAVENOUS at 07:42

## 2020-03-06 RX ADMIN — PROPOFOL 200 MG: 10 INJECTION, EMULSION INTRAVENOUS at 07:42

## 2020-03-06 RX ADMIN — MEPERIDINE HYDROCHLORIDE 12.5 MG: 50 INJECTION, SOLUTION INTRAMUSCULAR; INTRAVENOUS; SUBCUTANEOUS at 09:01

## 2020-03-06 RX ADMIN — FENTANYL CITRATE 50 MCG: 50 INJECTION, SOLUTION INTRAMUSCULAR; INTRAVENOUS at 08:18

## 2020-03-06 RX ADMIN — HYDROMORPHONE HYDROCHLORIDE 0.5 MG: 1 INJECTION, SOLUTION INTRAMUSCULAR; INTRAVENOUS; SUBCUTANEOUS at 08:35

## 2020-03-06 RX ADMIN — FENTANYL CITRATE 100 MCG: 50 INJECTION, SOLUTION INTRAMUSCULAR; INTRAVENOUS at 07:42

## 2020-03-06 RX ADMIN — SODIUM CHLORIDE, POTASSIUM CHLORIDE, SODIUM LACTATE AND CALCIUM CHLORIDE: 600; 310; 30; 20 INJECTION, SOLUTION INTRAVENOUS at 06:55

## 2020-03-06 RX ADMIN — ONDANSETRON HYDROCHLORIDE 4 MG: 2 INJECTION, SOLUTION INTRAMUSCULAR; INTRAVENOUS at 07:48

## 2020-03-06 RX ADMIN — FENTANYL CITRATE 50 MCG: 50 INJECTION, SOLUTION INTRAMUSCULAR; INTRAVENOUS at 08:23

## 2020-03-06 RX ADMIN — CEFAZOLIN SODIUM 2 G: 2 SOLUTION INTRAVENOUS at 07:47

## 2020-03-06 RX ADMIN — LIDOCAINE HYDROCHLORIDE 70 MG: 20 INJECTION, SOLUTION INTRAVENOUS at 07:42

## 2020-03-06 RX ADMIN — HYDROMORPHONE HYDROCHLORIDE 0.5 MG: 1 INJECTION, SOLUTION INTRAMUSCULAR; INTRAVENOUS; SUBCUTANEOUS at 08:41

## 2020-03-06 RX ADMIN — MEPERIDINE HYDROCHLORIDE 12.5 MG: 50 INJECTION, SOLUTION INTRAMUSCULAR; INTRAVENOUS; SUBCUTANEOUS at 08:56

## 2020-03-06 RX ADMIN — MIDAZOLAM 2 MG: 1 INJECTION INTRAMUSCULAR; INTRAVENOUS at 07:34

## 2020-03-06 RX ADMIN — HYDROCODONE BITARTRATE AND ACETAMINOPHEN 1 TABLET: 5; 325 TABLET ORAL at 10:18

## 2020-03-06 RX ADMIN — FENTANYL CITRATE 50 MCG: 50 INJECTION, SOLUTION INTRAMUSCULAR; INTRAVENOUS at 07:52

## 2020-03-06 RX ADMIN — DEXAMETHASONE SODIUM PHOSPHATE 10 MG: 10 INJECTION, SOLUTION INTRAMUSCULAR; INTRAVENOUS at 07:48

## 2020-03-06 ASSESSMENT — PAIN SCALES - GENERAL
PAINLEVEL_OUTOF10: 10
PAINLEVEL_OUTOF10: 10
PAINLEVEL_OUTOF10: 8
PAINLEVEL_OUTOF10: 6
PAINLEVEL_OUTOF10: 5
PAINLEVEL_OUTOF10: 8
PAINLEVEL_OUTOF10: 9
PAINLEVEL_OUTOF10: 8
PAINLEVEL_OUTOF10: 10
PAINLEVEL_OUTOF10: 3

## 2020-03-06 ASSESSMENT — PULMONARY FUNCTION TESTS
PIF_VALUE: 1
PIF_VALUE: 0
PIF_VALUE: 2
PIF_VALUE: 1
PIF_VALUE: 0
PIF_VALUE: 24
PIF_VALUE: 22
PIF_VALUE: 20
PIF_VALUE: 23
PIF_VALUE: 1
PIF_VALUE: 0
PIF_VALUE: 1
PIF_VALUE: 21
PIF_VALUE: 23
PIF_VALUE: 21
PIF_VALUE: 1
PIF_VALUE: 19
PIF_VALUE: 1
PIF_VALUE: 1
PIF_VALUE: 19
PIF_VALUE: 18
PIF_VALUE: 23
PIF_VALUE: 23
PIF_VALUE: 19
PIF_VALUE: 20
PIF_VALUE: 23
PIF_VALUE: 23
PIF_VALUE: 18
PIF_VALUE: 23
PIF_VALUE: 24
PIF_VALUE: 5
PIF_VALUE: 24
PIF_VALUE: 23
PIF_VALUE: 21
PIF_VALUE: 24
PIF_VALUE: 19
PIF_VALUE: 1
PIF_VALUE: 18
PIF_VALUE: 0
PIF_VALUE: 23
PIF_VALUE: 24
PIF_VALUE: 1
PIF_VALUE: 19
PIF_VALUE: 23
PIF_VALUE: 1
PIF_VALUE: 27
PIF_VALUE: 21
PIF_VALUE: 0

## 2020-03-06 ASSESSMENT — PAIN DESCRIPTION - DESCRIPTORS
DESCRIPTORS: ACHING;DISCOMFORT
DESCRIPTORS: DISCOMFORT;ACHING

## 2020-03-06 ASSESSMENT — PAIN DESCRIPTION - PROGRESSION: CLINICAL_PROGRESSION: RAPIDLY IMPROVING

## 2020-03-06 ASSESSMENT — PAIN DESCRIPTION - LOCATION
LOCATION: ABDOMEN
LOCATION: ABDOMEN

## 2020-03-06 ASSESSMENT — PAIN DESCRIPTION - ORIENTATION: ORIENTATION: RIGHT

## 2020-03-06 ASSESSMENT — PAIN - FUNCTIONAL ASSESSMENT: PAIN_FUNCTIONAL_ASSESSMENT: 0-10

## 2020-03-06 ASSESSMENT — PAIN DESCRIPTION - PAIN TYPE
TYPE: SURGICAL PAIN
TYPE: SURGICAL PAIN

## 2020-03-06 ASSESSMENT — ENCOUNTER SYMPTOMS: SHORTNESS OF BREATH: 1

## 2020-03-06 ASSESSMENT — PAIN DESCRIPTION - ONSET: ONSET: GRADUAL

## 2020-03-06 ASSESSMENT — LIFESTYLE VARIABLES: SMOKING_STATUS: 1

## 2020-03-06 ASSESSMENT — PAIN DESCRIPTION - FREQUENCY: FREQUENCY: CONTINUOUS

## 2020-03-06 NOTE — ANESTHESIA PRE PROCEDURE
Department of Anesthesiology  Preprocedure Note       Name:  Sapna Bryan   Age:  47 y.o.  :  1965                                          MRN:  [id number]         Date:  3/6/2020      Surgeon: Heydi Martinez):  Daniel Asencio MD    Procedure: CHOLECYSTECTOMY LAPAROSCOPIC (N/A )  EGD ESOPHAGOGASTRODUODENOSCOPY (N/A )    Medications prior to admission:   Prior to Admission medications    Medication Sig Start Date End Date Taking? Authorizing Provider   pantoprazole (PROTONIX) 40 MG tablet Take 1 tablet by mouth every morning (before breakfast) 20   PENNIE Tinoco - CNP   Cholecalciferol (VITAMIN D3) 2000 units CAPS Take 1 capsule by mouth daily 10/9/19   Gara Pillion, DO   atorvastatin (LIPITOR) 20 MG tablet Take 1 tablet by mouth daily 10/9/19   Gara Pillion, DO   metoprolol tartrate (LOPRESSOR) 25 MG tablet As needed for palpitations 18   Gara Pillion, DO       Current medications:    Current Facility-Administered Medications   Medication Dose Route Frequency Provider Last Rate Last Dose    ceFAZolin (ANCEF) 2 g in dextrose 3 % 50 mL IVPB (duplex)  2 g Intravenous On Call to [address] [place] [address], MD        lactated ringers infusion   Intravenous Continuous Daniel Asencio MD        sodium chloride flush 0.9 % injection 10 mL  10 mL Intravenous 2 times per day Daniel Asencio MD        sodium chloride flush 0.9 % injection 10 mL  10 mL Intravenous PRN Daniel Asencio MD           Allergies: Allergies   Allergen Reactions    Cortisone Other (See Comments)     Redness and breaking into a sweat.    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    DDD (degenerative disc disease), lumbar M51.36    Elbow pain M25.529    Lateral epicondylitis M77.10    Paroxysmal SVT (supraventricular tachycardia) ([facility]) I47.1    Palpitations R00.2    Primary osteoarthritis of left knee M17.12    Mixed hyperlipidemia E78.2    Vitamin D deficiency E55.9    Bronchitis, acute, with bronchospasm J20.9    Exertional dyspnea R06.09    Primary osteoarthritis of right knee M17.11    Acute medial meniscal tear S83.249A    Complex tear of lateral meniscus of right knee as current injury S83.271A    Synovitis M65.9    Chronic obstructive pulmonary disease (HCC) J44.9    Atypical chest pain R07.89       Past Medical History:        Diagnosis Date    At high risk for deep venous thrombosis     Cancer ([facility] [address]     cervical    COPD (chronic obstructive pulmonary disease) ([facility])     early stage    DJD (degenerative joint disease) of knee 7/18/2014    GERD (gastroesophageal reflux disease)     Headache     Hiatal hernia     Mixed hyperlipidemia 10/25/2016    SVT (supraventricular tachycardia) ([facility])     had episode of SVT 6 months ago and several small episode over last couple months       Past Surgical History:        Procedure Laterality Date    BACK SURGERY      lumbar pins & screws    BONE BIOPSY      ENDOMETRIAL ABLATION      ENDOMETRIAL ABLATION      ENDOSCOPY, COLON, DIAGNOSTIC      KNEE ARTHROSCOPY Right 05/05/2017    KNEE SURGERY Left     arthroscopy    TONSILLECTOMY      TUBAL LIGATION         Social History:    Social History     Tobacco Use    Smoking status: Current Every Day Smoker     Packs/day: 1.00     Years: 30.00     Pack years: 30.00     Types: Cigarettes    Smokeless tobacco: Never Used   Substance Use Topics    Alcohol use: Yes     Comment: rarely                                Ready to quit: Not Answered  Counseling given: Not Answered      Vital Signs (Current):   Vitals:    03/06/20 0634   BP: (!) 116/59   Pulse: 84   Resp: 20   Temp: 36.4 °C (97.6 °F)   TempSrc: Temporal SpO2: 97%   Weight: 194 lb (88 kg)   Height: 5' 8\" (1.727 m)                                              BP Readings from Last 3 Encounters:   03/06/20 (!) 116/59   01/06/20 112/70   10/21/19 118/62       NPO Status: Time of last liquid consumption: 2345                        Time of last solid consumption: 2300                        Date of last liquid consumption: 03/05/20                        Date of last solid food consumption: 03/05/20    BMI:   Wt Readings from Last 3 Encounters:   03/06/20 194 lb (88 kg)   01/06/20 191 lb 6.4 oz (86.8 kg)   10/21/19 185 lb (83.9 kg)     Body mass index is 29.5 kg/m². CBC:   Lab Results   Component Value Date    WBC 4.5 03/05/2020    RBC 4.77 03/05/2020    HGB 15.1 03/05/2020    HCT 45.3 03/05/2020    MCV 95.0 03/05/2020    RDW 12.4 03/05/2020     03/05/2020       CMP:   Lab Results   Component Value Date     03/05/2020    K 4.1 03/05/2020    K 4.3 07/10/2019     03/05/2020    CO2 24 03/05/2020    BUN 9 03/05/2020    CREATININE 0.6 03/05/2020    GFRAA >60 03/05/2020    LABGLOM >60 03/05/2020    GLUCOSE 93 03/05/2020    GLUCOSE 95 04/22/2012    PROT 7.7 07/10/2019    CALCIUM 9.5 03/05/2020    BILITOT 0.5 07/10/2019    ALKPHOS 110 07/10/2019    AST 20 07/10/2019    ALT 24 07/10/2019       POC Tests: No results for input(s): POCGLU, POCNA, POCK, POCCL, POCBUN, POCHEMO, POCHCT in the last 72 hours.     Coags: No results found for: PROTIME, INR, APTT    HCG (If Applicable):   Lab Results   Component Value Date    PREGTESTUR NEGATIVE 03/06/2020        ABGs: No results found for: PHART, PO2ART, YPX7DCP, JXC2EQJ, BEART, J2BUABWB     Type & Screen (If Applicable):  No results found for: LABABO, LABRH     12 Lead EKG 3/5/20 HR 74  Narrative & Impression     Normal sinus rhythm  Cannot rule out Anterior infarct (cited on or before 10-JUL-2019)  Abnormal ECG  When compared with ECG of 10-JUL-2019 15:32,  No significant change was found     Echo 12/19/17 Blocker         Neuro/Psych:   (+) neuromuscular disease (Numbness in hands):, headaches: migraine headaches,              ROS comment: DJD GI/Hepatic/Renal:   (+) hiatal hernia, GERD: poorly controlled,          ROS comment: For lap ricky and EGD 3/6/20. Endo/Other:    (+) : arthritis (OA throughout body): OA., malignancy/cancer (cervical - removed surgically, no chemo). Pt had no PAT visit       Abdominal:         (-) obese     Vascular: negative vascular ROS. Anesthesia Plan      general     ASA 3       Induction: intravenous. BIS  MIPS: Postoperative opioids intended and Prophylactic antiemetics administered. Anesthetic plan and risks discussed with patient. Use of blood products discussed with patient whom consented to blood products. Plan discussed with CRNA and attending. Jaguar Balbuena RN, [facility]   3/6/2020    DOS STAFF ADDENDUM:    Pt seen and examined, chart reviewed (including anesthesia, drug and allergy history). Anesthetic plan, risks, benefits, alternatives, and personnel involved discussed with patient. Patient verbalized an understanding and agrees to proceed. Plan discussed with care team members and agreed upon.     Anjel Sotomayor MD  Staff Anesthesiologist  7:26 AM

## 2020-03-06 NOTE — OP NOTE
These were taken with   ligamax clip appliers, 2 distally and 1 proximally and then ligated with   Endoshears. Electrocautery then was able to remove the gallbladder from   liver bed. Any bleeding was electrocauterized for hemostasis. The   gallbladder was removed previous to this with the bag. It was sent for   specimen at that time. The 10-mm abdominal incision and defect in the fascia was closed in a   figure-of-8 fashion with 0 Vicryl suture. Once this was done, the skin was   approximated with 4-0 Monocryl suture in a subcuticular fashion. The patient   was then woken up in stable and taken to PACU.     Martita Ham MD  8:10 AM  3/6/2020

## 2020-03-26 ENCOUNTER — TELEPHONE (OUTPATIENT)
Dept: SURGERY | Age: 55
End: 2020-03-26

## 2020-03-26 ENCOUNTER — OFFICE VISIT (OUTPATIENT)
Dept: SURGERY | Age: 55
End: 2020-03-26
Payer: MEDICARE

## 2020-03-26 VITALS
TEMPERATURE: 96.9 F | OXYGEN SATURATION: 94 % | WEIGHT: 190 LBS | HEIGHT: 68 IN | SYSTOLIC BLOOD PRESSURE: 118 MMHG | BODY MASS INDEX: 28.79 KG/M2 | DIASTOLIC BLOOD PRESSURE: 74 MMHG | HEART RATE: 90 BPM

## 2020-03-26 PROCEDURE — 99213 OFFICE O/P EST LOW 20 MIN: CPT | Performed by: SURGERY

## 2020-03-26 PROCEDURE — G8484 FLU IMMUNIZE NO ADMIN: HCPCS | Performed by: SURGERY

## 2020-03-26 PROCEDURE — G8427 DOCREV CUR MEDS BY ELIG CLIN: HCPCS | Performed by: SURGERY

## 2020-03-26 PROCEDURE — 4004F PT TOBACCO SCREEN RCVD TLK: CPT | Performed by: SURGERY

## 2020-03-26 PROCEDURE — 3017F COLORECTAL CA SCREEN DOC REV: CPT | Performed by: SURGERY

## 2020-03-26 PROCEDURE — G8419 CALC BMI OUT NRM PARAM NOF/U: HCPCS | Performed by: SURGERY

## 2020-03-26 NOTE — PROGRESS NOTES
General Surgery History and Physical    Patient's Name/Date of Birth: Greer Callahan / 1965    Date: March 26, 2020     Surgeon: Hao De Santiago M.D.    PCP: Saman Arevalo DO     Chief Complaint: heartburn    HPI:   Greer Callahan is a 47 y.o. female who presents for evaluation of heartburn/gerd that was responsive to medical treatment but has become recalcitrant.   Has severe GERD,  Timing is constant, radiation to epigastrium, alleviated by nothing and started years ago and severity is 2-8/10       Past Medical History:   Diagnosis Date    At high risk for deep venous thrombosis     Cancer ([facility] [address]     cervical    COPD (chronic obstructive pulmonary disease) (HCC)     early stage    DJD (degenerative joint disease) of knee 7/18/2014    GERD (gastroesophageal reflux disease)     Headache     Hiatal hernia     Mixed hyperlipidemia 10/25/2016    SVT (supraventricular tachycardia) (HCC)     had episode of SVT 6 months ago and several small episode over last couple months       Past Surgical History:   Procedure Laterality Date    BACK SURGERY      lumbar pins & screws    BONE BIOPSY      CHOLECYSTECTOMY, LAPAROSCOPIC N/A 3/6/2020    CHOLECYSTECTOMY LAPAROSCOPIC performed by Shahab Boland MD at [address] [facility] [place], DIAGNOSTIC      KNEE ARTHROSCOPY Right 05/05/2017    KNEE SURGERY Left     arthroscopy    TONSILLECTOMY      TUBAL LIGATION      UPPER GASTROINTESTINAL ENDOSCOPY N/A 3/6/2020    EGD ESOPHAGOGASTRODUODENOSCOPY performed by Shahab Boland MD at [address]       Current Outpatient Medications   Medication Sig Dispense Refill    ibuprofen (ADVIL;MOTRIN) 800 MG tablet Take 1 tablet by mouth every 6 hours as needed for Pain 20 tablet 0    pantoprazole (PROTONIX) 40 MG tablet Take 1 tablet by mouth every morning (before breakfast) 30 tablet 3    Cholecalciferol (VITAMIN D3) 2000 units CAPS Take 1 capsule by mouth daily 30 capsule 3    atorvastatin (LIPITOR) 20 MG tablet Take 1 tablet by mouth daily 30 tablet 3    metoprolol tartrate (LOPRESSOR) 25 MG tablet As needed for palpitations 30 tablet 1     No current facility-administered medications for this visit. Allergies   Allergen Reactions    Cortisone Other (See Comments)     Redness and breaking into a sweat.  Medrol [Methylprednisolone]      Fever; redness; sweat    Oxycontin [Oxycodone Hcl] Itching    Penicillins      Cant remember reaction was from childhood       The patient has a family history that is negative for severe cardiovascular or respiratory issues, negative for reaction to anesthesia.     Social History     Socioeconomic History    Marital status:      Spouse name: Not on file    Number of children: Not on file    Years of education: Not on file    Highest education level: Not on file   Occupational History    Not on file   Social Needs    Financial resource strain: Not on file    Food insecurity     Worry: Not on file     Inability: Not on file    Transportation needs     Medical: Not on file     Non-medical: Not on file   Tobacco Use    Smoking status: Current Every Day Smoker     Packs/day: 1.00     Years: 30.00     Pack years: 30.00     Types: Cigarettes    Smokeless tobacco: Never Used   Substance and Sexual Activity    Alcohol use: Yes     Comment: rarely    Drug use: No    Sexual activity: Not on file   Lifestyle    Physical activity     Days per week: Not on file     Minutes per session: Not on file    Stress: Not on file   Relationships    Social connections     Talks on phone: Not on file     Gets together: Not on file     Attends Congregational service: Not on file     Active member of club or organization: Not on file     Attends meetings of clubs or organizations: Not on file     Relationship status: Not on file    Intimate partner violence     Fear of current or ex partner: Not on file     Emotionally abused: Not on

## 2020-04-17 ENCOUNTER — PREP FOR PROCEDURE (OUTPATIENT)
Dept: SURGERY | Age: 55
End: 2020-04-17

## 2020-04-17 RX ORDER — SODIUM CHLORIDE, SODIUM LACTATE, POTASSIUM CHLORIDE, CALCIUM CHLORIDE 600; 310; 30; 20 MG/100ML; MG/100ML; MG/100ML; MG/100ML
INJECTION, SOLUTION INTRAVENOUS CONTINUOUS
Status: CANCELLED | OUTPATIENT
Start: 2020-04-17

## 2020-05-01 ENCOUNTER — TELEPHONE (OUTPATIENT)
Dept: SURGERY | Age: 55
End: 2020-05-01

## 2020-05-12 ENCOUNTER — HOSPITAL ENCOUNTER (INPATIENT)
Age: 55
LOS: 1 days | Discharge: HOME | DRG: 310 | End: 2020-05-13
Attending: EMERGENCY MEDICINE | Admitting: INTERNAL MEDICINE
Payer: MEDICARE

## 2020-05-12 ENCOUNTER — APPOINTMENT (OUTPATIENT)
Dept: GENERAL RADIOLOGY | Age: 55
DRG: 310 | End: 2020-05-12
Payer: MEDICARE

## 2020-05-12 PROBLEM — R07.9 CHEST PAIN: Status: ACTIVE | Noted: 2020-05-12

## 2020-05-12 LAB
ALBUMIN SERPL-MCNC: 4.3 G/DL (ref 3.5–5.2)
ALP BLD-CCNC: 101 U/L (ref 35–104)
ALT SERPL-CCNC: 26 U/L (ref 0–32)
ANION GAP SERPL CALCULATED.3IONS-SCNC: 12 MMOL/L (ref 7–16)
AST SERPL-CCNC: 23 U/L (ref 0–31)
BASOPHILS ABSOLUTE: 0.02 E9/L (ref 0–0.2)
BASOPHILS RELATIVE PERCENT: 0.4 % (ref 0–2)
BILIRUB SERPL-MCNC: 0.3 MG/DL (ref 0–1.2)
BUN BLDV-MCNC: 10 MG/DL (ref 6–20)
CALCIUM SERPL-MCNC: 8.9 MG/DL (ref 8.6–10.2)
CHLORIDE BLD-SCNC: 105 MMOL/L (ref 98–107)
CO2: 26 MMOL/L (ref 22–29)
CREAT SERPL-MCNC: 0.7 MG/DL (ref 0.5–1)
EOSINOPHILS ABSOLUTE: 0.07 E9/L (ref 0.05–0.5)
EOSINOPHILS RELATIVE PERCENT: 1.3 % (ref 0–6)
GFR AFRICAN AMERICAN: >60
GFR NON-AFRICAN AMERICAN: >60 ML/MIN/1.73
GLUCOSE BLD-MCNC: 106 MG/DL (ref 74–99)
HCT VFR BLD CALC: 43 % (ref 34–48)
HEMOGLOBIN: 14.3 G/DL (ref 11.5–15.5)
IMMATURE GRANULOCYTES #: 0.02 E9/L
IMMATURE GRANULOCYTES %: 0.4 % (ref 0–5)
LYMPHOCYTES ABSOLUTE: 1.93 E9/L (ref 1.5–4)
LYMPHOCYTES RELATIVE PERCENT: 35 % (ref 20–42)
MAGNESIUM: 2 MG/DL (ref 1.6–2.6)
MCH RBC QN AUTO: 32.4 PG (ref 26–35)
MCHC RBC AUTO-ENTMCNC: 33.3 % (ref 32–34.5)
MCV RBC AUTO: 97.3 FL (ref 80–99.9)
MONOCYTES ABSOLUTE: 0.35 E9/L (ref 0.1–0.95)
MONOCYTES RELATIVE PERCENT: 6.4 % (ref 2–12)
NEUTROPHILS ABSOLUTE: 3.12 E9/L (ref 1.8–7.3)
NEUTROPHILS RELATIVE PERCENT: 56.5 % (ref 43–80)
PDW BLD-RTO: 12.8 FL (ref 11.5–15)
PLATELET # BLD: 171 E9/L (ref 130–450)
PMV BLD AUTO: 10.9 FL (ref 7–12)
POTASSIUM REFLEX MAGNESIUM: 4.2 MMOL/L (ref 3.5–5)
RBC # BLD: 4.42 E12/L (ref 3.5–5.5)
SODIUM BLD-SCNC: 143 MMOL/L (ref 132–146)
TOTAL PROTEIN: 6.9 G/DL (ref 6.4–8.3)
TROPONIN: <0.01 NG/ML (ref 0–0.03)
TROPONIN: <0.01 NG/ML (ref 0–0.03)
WBC # BLD: 5.5 E9/L (ref 4.5–11.5)

## 2020-05-12 PROCEDURE — 1200000000 HC SEMI PRIVATE

## 2020-05-12 PROCEDURE — 36415 COLL VENOUS BLD VENIPUNCTURE: CPT

## 2020-05-12 PROCEDURE — 83735 ASSAY OF MAGNESIUM: CPT

## 2020-05-12 PROCEDURE — 99285 EMERGENCY DEPT VISIT HI MDM: CPT

## 2020-05-12 PROCEDURE — 71045 X-RAY EXAM CHEST 1 VIEW: CPT

## 2020-05-12 PROCEDURE — 80053 COMPREHEN METABOLIC PANEL: CPT

## 2020-05-12 PROCEDURE — 96374 THER/PROPH/DIAG INJ IV PUSH: CPT

## 2020-05-12 PROCEDURE — 6370000000 HC RX 637 (ALT 250 FOR IP): Performed by: STUDENT IN AN ORGANIZED HEALTH CARE EDUCATION/TRAINING PROGRAM

## 2020-05-12 PROCEDURE — 6360000002 HC RX W HCPCS: Performed by: EMERGENCY MEDICINE

## 2020-05-12 PROCEDURE — 84484 ASSAY OF TROPONIN QUANT: CPT

## 2020-05-12 PROCEDURE — 93005 ELECTROCARDIOGRAM TRACING: CPT | Performed by: EMERGENCY MEDICINE

## 2020-05-12 PROCEDURE — 85025 COMPLETE CBC W/AUTO DIFF WBC: CPT

## 2020-05-12 PROCEDURE — 93005 ELECTROCARDIOGRAM TRACING: CPT | Performed by: STUDENT IN AN ORGANIZED HEALTH CARE EDUCATION/TRAINING PROGRAM

## 2020-05-12 RX ORDER — ADENOSINE 3 MG/ML
6 INJECTION, SOLUTION INTRAVENOUS ONCE
Status: COMPLETED | OUTPATIENT
Start: 2020-05-12 | End: 2020-05-12

## 2020-05-12 RX ORDER — ASPIRIN 325 MG
325 TABLET ORAL ONCE
Status: COMPLETED | OUTPATIENT
Start: 2020-05-12 | End: 2020-05-12

## 2020-05-12 RX ORDER — ADENOSINE 3 MG/ML
INJECTION, SOLUTION INTRAVENOUS
Status: DISPENSED
Start: 2020-05-12 | End: 2020-05-13

## 2020-05-12 RX ORDER — ONDANSETRON 2 MG/ML
4 INJECTION INTRAMUSCULAR; INTRAVENOUS EVERY 6 HOURS PRN
Status: DISCONTINUED | OUTPATIENT
Start: 2020-05-12 | End: 2020-05-13 | Stop reason: HOSPADM

## 2020-05-12 RX ORDER — DILTIAZEM HYDROCHLORIDE 5 MG/ML
INJECTION INTRAVENOUS
Status: DISCONTINUED
Start: 2020-05-12 | End: 2020-05-12 | Stop reason: WASHOUT

## 2020-05-12 RX ORDER — ATORVASTATIN CALCIUM 40 MG/1
80 TABLET, FILM COATED ORAL DAILY
Status: DISCONTINUED | OUTPATIENT
Start: 2020-05-13 | End: 2020-05-13 | Stop reason: HOSPADM

## 2020-05-12 RX ORDER — ADENOSINE 3 MG/ML
12 INJECTION, SOLUTION INTRAVENOUS ONCE
Status: COMPLETED | OUTPATIENT
Start: 2020-05-12 | End: 2020-05-12

## 2020-05-12 RX ORDER — VITAMIN B COMPLEX
2 TABLET ORAL DAILY
Status: DISCONTINUED | OUTPATIENT
Start: 2020-05-13 | End: 2020-05-13 | Stop reason: HOSPADM

## 2020-05-12 RX ORDER — ASPIRIN 81 MG/1
81 TABLET, CHEWABLE ORAL DAILY
Status: DISCONTINUED | OUTPATIENT
Start: 2020-05-13 | End: 2020-05-13 | Stop reason: HOSPADM

## 2020-05-12 RX ORDER — PANTOPRAZOLE SODIUM 40 MG/1
40 TABLET, DELAYED RELEASE ORAL
Status: DISCONTINUED | OUTPATIENT
Start: 2020-05-13 | End: 2020-05-13 | Stop reason: HOSPADM

## 2020-05-12 RX ADMIN — ASPIRIN 325 MG ORAL TABLET 325 MG: 325 PILL ORAL at 17:13

## 2020-05-12 RX ADMIN — ADENOSINE 12 MG: 3 INJECTION, SOLUTION INTRAVENOUS at 16:42

## 2020-05-12 RX ADMIN — ADENOSINE 6 MG: 3 INJECTION, SOLUTION INTRAVENOUS at 16:40

## 2020-05-12 ASSESSMENT — ENCOUNTER SYMPTOMS
SORE THROAT: 0
COUGH: 0
BLOOD IN STOOL: 0
WHEEZING: 0
VOMITING: 0
DIARRHEA: 0
SHORTNESS OF BREATH: 0
EYE DISCHARGE: 0
CONSTIPATION: 0
ABDOMINAL PAIN: 0
CHEST TIGHTNESS: 0
COLOR CHANGE: 0
BACK PAIN: 0
EYE ITCHING: 0
PHOTOPHOBIA: 0
APNEA: 0
EYE REDNESS: 0
NAUSEA: 0
CHOKING: 0
EYE PAIN: 0
ABDOMINAL DISTENTION: 0
ANAL BLEEDING: 0
SINUS PRESSURE: 0

## 2020-05-12 ASSESSMENT — PAIN SCALES - GENERAL: PAINLEVEL_OUTOF10: 0

## 2020-05-12 ASSESSMENT — HEART SCORE: ECG: 0

## 2020-05-12 NOTE — LETTER
Beneficiary Notification Letter  BPCI Advanced     Your Doctor or Rupesh,    We wanted to let you know that your health care provider, Isael Desir, has volunteered to take part in our [facility] for [facility] Medicaid Services (CMS) Bundled Payments for [facility] (BPCI Advanced). This doesnt change your Medicare rights or benefits and you dont need to do anything. What are bundled payments? A bundled payment combines, or bundles together, payments that Medicare makes to your health care providers for the many different kinds of medical services you might get in a specific time period. In BPCI Advanced, this time period could include a hospital  inpatient stay or outpatient procedure, plus 90 days. Why would Medicare bundle payments? Bundled payments are thought of as a value-based way to pay because health care  providers are responsible for both the quality and cost of medical care they give. This is  a relatively new way of paying health care providers compared to thefee-for-service  way Medicare has traditionally paid, where providers are paid separately for each  service they provide. Bundled payments encourage these providers to work together to  provide better, more coordinated care during your hospital stay, or outpatient procedure,  and through your recovery. What does BPCI Advance mean for me? Youre more likely to get even better care when hospitals, doctors, and other health care  providers work together. In BPCI Advanced, hospitals, doctors, and other health care  providers may be rewarded for providing better, more coordinated health care. Medicare  will watch BPCI Advanced participants closely to make sure that you and other patients  keep getting efficient, high quality care. What do I need to know about BPCI Advanced? · All of the inpatient and outpatient Clinical Episodes that are currently included   under BPCI Advanced. A Clinical Episode is a grouping of medical conditions or   diagnoses that are included in the [address] [facility].

## 2020-05-13 ENCOUNTER — APPOINTMENT (OUTPATIENT)
Dept: NUCLEAR MEDICINE | Age: 55
DRG: 310 | End: 2020-05-13
Payer: MEDICARE

## 2020-05-13 VITALS
HEART RATE: 72 BPM | BODY MASS INDEX: 30.43 KG/M2 | TEMPERATURE: 97.9 F | SYSTOLIC BLOOD PRESSURE: 117 MMHG | OXYGEN SATURATION: 98 % | WEIGHT: 200.8 LBS | RESPIRATION RATE: 16 BRPM | HEIGHT: 68 IN | DIASTOLIC BLOOD PRESSURE: 59 MMHG

## 2020-05-13 LAB
ANION GAP SERPL CALCULATED.3IONS-SCNC: 11 MMOL/L (ref 7–16)
BASOPHILS ABSOLUTE: 0.02 E9/L (ref 0–0.2)
BASOPHILS RELATIVE PERCENT: 0.4 % (ref 0–2)
BUN BLDV-MCNC: 9 MG/DL (ref 6–20)
CALCIUM SERPL-MCNC: 8.8 MG/DL (ref 8.6–10.2)
CHLORIDE BLD-SCNC: 108 MMOL/L (ref 98–107)
CHOLESTEROL, TOTAL: 149 MG/DL (ref 0–199)
CO2: 24 MMOL/L (ref 22–29)
CREAT SERPL-MCNC: 0.6 MG/DL (ref 0.5–1)
EKG ATRIAL RATE: 101 BPM
EKG ATRIAL RATE: 95 BPM
EKG P AXIS: 50 DEGREES
EKG P-R INTERVAL: 182 MS
EKG Q-T INTERVAL: 232 MS
EKG Q-T INTERVAL: 362 MS
EKG QRS DURATION: 74 MS
EKG QRS DURATION: 80 MS
EKG QTC CALCULATION (BAZETT): 417 MS
EKG QTC CALCULATION (BAZETT): 454 MS
EKG R AXIS: -3 DEGREES
EKG R AXIS: 12 DEGREES
EKG T AXIS: 11 DEGREES
EKG T AXIS: 51 DEGREES
EKG VENTRICULAR RATE: 195 BPM
EKG VENTRICULAR RATE: 95 BPM
EOSINOPHILS ABSOLUTE: 0.09 E9/L (ref 0.05–0.5)
EOSINOPHILS RELATIVE PERCENT: 1.9 % (ref 0–6)
GFR AFRICAN AMERICAN: >60
GFR NON-AFRICAN AMERICAN: >60 ML/MIN/1.73
GLUCOSE BLD-MCNC: 108 MG/DL (ref 74–99)
HCT VFR BLD CALC: 41.6 % (ref 34–48)
HDLC SERPL-MCNC: 35 MG/DL
HEMOGLOBIN: 13.5 G/DL (ref 11.5–15.5)
IMMATURE GRANULOCYTES #: 0.02 E9/L
IMMATURE GRANULOCYTES %: 0.4 % (ref 0–5)
LDL CHOLESTEROL CALCULATED: 89 MG/DL (ref 0–99)
LV EF: 55 %
LV EF: 70 %
LVEF MODALITY: NORMAL
LVEF MODALITY: NORMAL
LYMPHOCYTES ABSOLUTE: 2.17 E9/L (ref 1.5–4)
LYMPHOCYTES RELATIVE PERCENT: 46.1 % (ref 20–42)
MAGNESIUM: 2.2 MG/DL (ref 1.6–2.6)
MCH RBC QN AUTO: 31.6 PG (ref 26–35)
MCHC RBC AUTO-ENTMCNC: 32.5 % (ref 32–34.5)
MCV RBC AUTO: 97.4 FL (ref 80–99.9)
MONOCYTES ABSOLUTE: 0.34 E9/L (ref 0.1–0.95)
MONOCYTES RELATIVE PERCENT: 7.2 % (ref 2–12)
NEUTROPHILS ABSOLUTE: 2.07 E9/L (ref 1.8–7.3)
NEUTROPHILS RELATIVE PERCENT: 44 % (ref 43–80)
PDW BLD-RTO: 12.8 FL (ref 11.5–15)
PHOSPHORUS: 4 MG/DL (ref 2.5–4.5)
PLATELET # BLD: 184 E9/L (ref 130–450)
PMV BLD AUTO: 11 FL (ref 7–12)
POTASSIUM SERPL-SCNC: 4.4 MMOL/L (ref 3.5–5)
RBC # BLD: 4.27 E12/L (ref 3.5–5.5)
SODIUM BLD-SCNC: 143 MMOL/L (ref 132–146)
TRIGL SERPL-MCNC: 125 MG/DL (ref 0–149)
TROPONIN: <0.01 NG/ML (ref 0–0.03)
TSH SERPL DL<=0.05 MIU/L-ACNC: 1.41 UIU/ML (ref 0.27–4.2)
VLDLC SERPL CALC-MCNC: 25 MG/DL
WBC # BLD: 4.7 E9/L (ref 4.5–11.5)

## 2020-05-13 PROCEDURE — 93016 CV STRESS TEST SUPVJ ONLY: CPT | Performed by: INTERNAL MEDICINE

## 2020-05-13 PROCEDURE — 80061 LIPID PANEL: CPT

## 2020-05-13 PROCEDURE — 36415 COLL VENOUS BLD VENIPUNCTURE: CPT

## 2020-05-13 PROCEDURE — 80048 BASIC METABOLIC PNL TOTAL CA: CPT

## 2020-05-13 PROCEDURE — 93017 CV STRESS TEST TRACING ONLY: CPT

## 2020-05-13 PROCEDURE — 93005 ELECTROCARDIOGRAM TRACING: CPT | Performed by: INTERNAL MEDICINE

## 2020-05-13 PROCEDURE — 93010 ELECTROCARDIOGRAM REPORT: CPT | Performed by: INTERNAL MEDICINE

## 2020-05-13 PROCEDURE — 83735 ASSAY OF MAGNESIUM: CPT

## 2020-05-13 PROCEDURE — 84484 ASSAY OF TROPONIN QUANT: CPT

## 2020-05-13 PROCEDURE — 84443 ASSAY THYROID STIM HORMONE: CPT

## 2020-05-13 PROCEDURE — 78452 HT MUSCLE IMAGE SPECT MULT: CPT

## 2020-05-13 PROCEDURE — 93306 TTE W/DOPPLER COMPLETE: CPT

## 2020-05-13 PROCEDURE — 93018 CV STRESS TEST I&R ONLY: CPT | Performed by: INTERNAL MEDICINE

## 2020-05-13 PROCEDURE — 84100 ASSAY OF PHOSPHORUS: CPT

## 2020-05-13 PROCEDURE — 3430000000 HC RX DIAGNOSTIC RADIOPHARMACEUTICAL: Performed by: RADIOLOGY

## 2020-05-13 PROCEDURE — 6370000000 HC RX 637 (ALT 250 FOR IP): Performed by: INTERNAL MEDICINE

## 2020-05-13 PROCEDURE — 85025 COMPLETE CBC W/AUTO DIFF WBC: CPT

## 2020-05-13 PROCEDURE — A9500 TC99M SESTAMIBI: HCPCS | Performed by: RADIOLOGY

## 2020-05-13 PROCEDURE — 6360000002 HC RX W HCPCS: Performed by: PHYSICIAN ASSISTANT

## 2020-05-13 PROCEDURE — 99222 1ST HOSP IP/OBS MODERATE 55: CPT | Performed by: INTERNAL MEDICINE

## 2020-05-13 RX ORDER — ASPIRIN 81 MG/1
81 TABLET, CHEWABLE ORAL DAILY
Qty: 30 TABLET | Refills: 3 | Status: SHIPPED | OUTPATIENT
Start: 2020-05-13 | End: 2020-07-29

## 2020-05-13 RX ADMIN — Medication 30 MILLICURIE: at 13:05

## 2020-05-13 RX ADMIN — MELATONIN 2000 UNITS: at 12:22

## 2020-05-13 RX ADMIN — REGADENOSON 0.4 MG: 0.08 INJECTION, SOLUTION INTRAVENOUS at 11:31

## 2020-05-13 RX ADMIN — PANTOPRAZOLE SODIUM 40 MG: 40 TABLET, DELAYED RELEASE ORAL at 12:22

## 2020-05-13 RX ADMIN — Medication 10 MILLICURIE: at 10:15

## 2020-05-13 RX ADMIN — ATORVASTATIN CALCIUM 80 MG: 40 TABLET, FILM COATED ORAL at 12:22

## 2020-05-13 RX ADMIN — METOPROLOL TARTRATE 25 MG: 25 TABLET, FILM COATED ORAL at 12:22

## 2020-05-13 ASSESSMENT — PAIN SCALES - GENERAL
PAINLEVEL_OUTOF10: 0
PAINLEVEL_OUTOF10: 0

## 2020-05-13 NOTE — PROCEDURES
Lexiscan Stress EKG Report:    Dx CP    Baseline EKG: normal sinus rhythm, nonspecific ST and T waves changes. Stress EKG: No ST-T changes. Arrhythmias: None. Symptoms: None. Summary:  Unremarkable lexiscan stress EKG. See separate report for stress perfusion results. Jocy Botello D.O.   Cardiologist  Cardiology, [facility]

## 2020-05-13 NOTE — DISCHARGE SUMMARY
The patient has opted against moving forward with stress test evaluation. She converted to normal sinus rhythm. She is requesting discharge home. Again, we have stressed the importance of cardiac risk factor modification. We are recommending inpatient stress test evaluation along with the cardiovascular team.  She defers. As she has been admitted to the hospital for less than 24 hours, this will act as my discharge summary.     Sophia May  10:20 AM

## 2020-05-13 NOTE — CONSULTS
Cortisone Other (See Comments)     Redness and breaking into a sweat.     Medrol [Methylprednisolone]      Fever; redness; sweat    Oxycontin [Oxycodone Hcl] Itching    Penicillins      Cant remember reaction was from childhood       Current Facility-Administered Medications   Medication Dose Route Frequency Provider Last Rate Last Dose    regadenoson (LEXISCAN) injection 0.4 mg  0.4 mg Intravenous ONCE PRN [place], PA-C        atorvastatin (LIPITOR) tablet 80 mg  80 mg Oral Daily Kaylen Bang, DO        Vitamin D (CHOLECALCIFEROL) tablet 2,000 Units  2 tablet Oral Daily Kaylen Bang, DO        metoprolol tartrate (LOPRESSOR) tablet 25 mg  25 mg Oral BID Kaylen Bang, DO        pantoprazole (PROTONIX) tablet 40 mg  40 mg Oral QAM AC Kaylen Bang, DO        aspirin chewable tablet 81 mg  81 mg Oral Daily Kaylen Bang, DO        ondansetron [facility]) injection 4 mg  4 mg Intravenous Q6H PRN Kaylen Bang, DO        enoxaparin (LOVENOX) injection 40 mg  40 mg Subcutaneous Daily Kaylen Bang, DO           Social History     Socioeconomic History    Marital status:      Spouse name: Not on file    Number of children: Not on file    Years of education: Not on file    Highest education level: Not on file   Occupational History    Not on file   Social Needs    Financial resource strain: Not on file    Food insecurity     Worry: Not on file     Inability: Not on file    Transportation needs     Medical: Not on file     Non-medical: Not on file   Tobacco Use    Smoking status: Current Every Day Smoker     Packs/day: 1.00     Years: 30.00     Pack years: 30.00     Types: Cigarettes    Smokeless tobacco: Never Used   Substance and Sexual Activity    Alcohol use: Yes     Comment: rarely    Drug use: No    Sexual activity: Not on file   Lifestyle    Physical activity     Days per week: Not on file     Minutes per session: Not on file    Stress: Not on file   Relationships    Social connections osteoarthritis of right knee    Acute medial meniscal tear    Complex tear of lateral meniscus of right knee as current injury    Synovitis    Chronic obstructive pulmonary disease (HCC)    Atypical chest pain    Symptomatic cholelithiasis    Gastroesophageal reflux disease without esophagitis    Chest pain     1. SVT: SVT highly suspicious for AVNRT. Echo pending. BB. Outpatient EP evaluation. 2. Chest pain: EKG normal at baseline. Echo pending. Stress ordered. 3. COPD: Per primary service. 4. Lipids: Statin. Ashley Moody D.O.   Cardiologist  Cardiology, [facility]

## 2020-05-13 NOTE — CARE COORDINATION
5/13/2020 1000 CM note: NO COVID-19 TESTING. Met with pt for transition of care needs. Pt crying,scared to have stress test and asking to be discharged. After pt spoke with  Dr Deidra Sinclair agreed to have stress test. Plan is home, family will provide ride, await stress test results.  [place]

## 2020-05-13 NOTE — H&P
rarely    Drug use: No    Sexual activity: Not on file   Lifestyle    Physical activity     Days per week: Not on file     Minutes per session: Not on file    Stress: Not on file   Relationships    Social connections     Talks on phone: Not on file     Gets together: Not on file     Attends Confucianism service: Not on file     Active member of club or organization: Not on file     Attends meetings of clubs or organizations: Not on file     Relationship status: Not on file    Intimate partner violence     Fear of current or ex partner: Not on file     Emotionally abused: Not on file     Physically abused: Not on file     Forced sexual activity: Not on file   Other Topics Concern    Not on file   Social History Narrative    Not on file       ROS:  General:   Denies chills, fatigue, fever, malaise, night sweats or weight loss    Psychological:   Denies anxiety, disorientation or hallucinations    ENT:    Denies epistaxis, headaches, vertigo or visual changes    Cardiovascular:   Admits to complete resolution of her presenting chest pain and palpitations. Respiratory:   Denies shortness of breath, coughing, sputum production, hemoptysis, or wheezing. No orthopnea. Gastrointestinal:   Denies nausea, vomiting, diarrhea, or constipation. Denies any abdominal pain. Denies change in bowel habits or stools. Genito-Urinary:    Denies any urgency, frequency, hematuria. Voiding without difficulty. Musculoskeletal:   Denies joint pain, joint stiffness, joint swelling or muscle pain    Neurology:    Denies any headache or focal neurological deficits. No weakness or paresthesia. Derm:    Denies any rashes, ulcers, or excoriations. Denies bruising. Extremities:   Denies any lower extremity swelling or edema.       PHYSICAL EXAM:  VITALS:  Vitals:    05/13/20 0815   BP:    Pulse: 72   Resp: 16   Temp: 97.9 °F (36.6 °C)   SpO2: 98%         CONSTITUTIONAL:    Awake, alert, cooperative, no apparent distress, and appears stated age    EYES:    PERRL, EOMI, sclera clear, conjunctiva normal    ENT:    Normocephalic, atraumatic, sinuses nontender on palpation. External ears without lesions. Oral pharynx with moist mucus membranes. Tonsils without erythema or exudates. NECK:    Supple, symmetrical, trachea midline, no adenopathy, thyroid symmetric, not enlarged and no tenderness, skin normal, no bruits, no JVD    HEMATOLOGIC/LYMPHATICS:    No cervical lymphadenopathy and no supraclavicular lymphadenopathy    LUNGS:    Symmetric. No increased work of breathing, good air exchange, clear to auscultation bilaterally, no wheezes, rhonchi, or rales,     CARDIOVASCULAR:    Normal apical impulse, regular rate and rhythm, normal S1 and S2, no S3 or S4, and no murmur noted    ABDOMEN:    No scars, normal bowel sounds, soft, non-distended, non-tender, no masses palpated, no hepatosplenomegaly, no rebound or guarding elicited on palpation     MUSCULOSKELETAL:    There is no redness, warmth, or swelling of the joints. Full range of motion noted. Motor strength is 5 out of 5 all extremities bilaterally. Tone is normal.    NEUROLOGIC:    Awake, alert, oriented to name, place and time. Cranial nerves II-XII are grossly intact. Motor is 5 out of 5 bilaterally. SKIN:    No bruising or bleeding. No redness, warmth, or swelling    EXTREMITIES:    Peripheral pulses present. No edema, cyanosis, or swelling. OSTEOPATHIC:    Examined in seated and supine positions. Normal thoracic kyphosis and lumbar lordosis. No acute somatic dysfunction.     LABORATORY DATA:  CBC with Differential:    Lab Results   Component Value Date    WBC 4.7 05/13/2020    RBC 4.27 05/13/2020    HGB 13.5 05/13/2020    HCT 41.6 05/13/2020     05/13/2020    MCV 97.4 05/13/2020    MCH 31.6 05/13/2020    MCHC 32.5 05/13/2020    RDW 12.8 05/13/2020    SEGSPCT 75 07/27/2013    LYMPHOPCT 46.1 05/13/2020    MONOPCT 7.2 05/13/2020    BASOPCT 0.4 05/13/2020    MONOSABS 0.34 05/13/2020    LYMPHSABS 2.17 05/13/2020    EOSABS 0.09 05/13/2020    BASOSABS 0.02 05/13/2020     BMP:    Lab Results   Component Value Date     05/13/2020    K 4.4 05/13/2020    K 4.2 05/12/2020     05/13/2020    CO2 24 05/13/2020    BUN 9 05/13/2020    LABALBU 4.3 05/12/2020    CREATININE 0.6 05/13/2020    CALCIUM 8.8 05/13/2020    GFRAA >60 05/13/2020    LABGLOM >60 05/13/2020    GLUCOSE 108 05/13/2020    GLUCOSE 95 04/22/2012       ASSESSMENT:  1. Supraventricular tachycardia with cardioversion following adenosine x2  2. Hyperlipidemia  3. Tobacco abuse with early COPD  4. History of cervical cancer    PLAN:  The patient converted to normal sinus rhythm with the administration of adenosine x2. Plans are for stress test evaluation today with the cardiovascular team.  Pending these results, the patient will likely be acceptable for discharge home later this afternoon with need for outpatient follow-up with the cardiovascular team as an outpatient.   We will defer any other adjustments with beta-blocker therapy to the cardiovascular team.    Amilcar Valdes D.O., [facility]  8:52 AM  5/13/2020    Electronically signed by Amilcar Valdes DO on 5/13/20 at 8:52 AM EDT

## 2020-05-14 ENCOUNTER — CARE COORDINATION (OUTPATIENT)
Dept: CASE MANAGEMENT | Age: 55
End: 2020-05-14

## 2020-05-14 LAB
EKG ATRIAL RATE: 75 BPM
EKG P AXIS: 66 DEGREES
EKG P-R INTERVAL: 180 MS
EKG Q-T INTERVAL: 408 MS
EKG QRS DURATION: 80 MS
EKG QTC CALCULATION (BAZETT): 455 MS
EKG R AXIS: 22 DEGREES
EKG T AXIS: 35 DEGREES
EKG VENTRICULAR RATE: 75 BPM

## 2020-05-14 NOTE — CARE COORDINATION
Patient was not on BPCI-A list until discharge yesterday and I was off. I did call her to explain letter and had to leave a message. I am mailing letter incase no call back.  Electronically signed by Raquel Smith on 5/14/2020 at 11:54 AM

## 2020-05-15 ENCOUNTER — CARE COORDINATION (OUTPATIENT)
Dept: CASE MANAGEMENT | Age: 55
End: 2020-05-15

## 2020-05-18 ENCOUNTER — OFFICE VISIT (OUTPATIENT)
Dept: FAMILY MEDICINE CLINIC | Age: 55
End: 2020-05-18
Payer: MEDICARE

## 2020-05-18 ENCOUNTER — TELEPHONE (OUTPATIENT)
Dept: NON INVASIVE DIAGNOSTICS | Age: 55
End: 2020-05-18

## 2020-05-18 VITALS
HEART RATE: 79 BPM | SYSTOLIC BLOOD PRESSURE: 116 MMHG | OXYGEN SATURATION: 98 % | BODY MASS INDEX: 29.55 KG/M2 | TEMPERATURE: 97.7 F | DIASTOLIC BLOOD PRESSURE: 72 MMHG | WEIGHT: 195 LBS | HEIGHT: 68 IN

## 2020-05-18 PROCEDURE — 99214 OFFICE O/P EST MOD 30 MIN: CPT | Performed by: FAMILY MEDICINE

## 2020-05-18 PROCEDURE — G8428 CUR MEDS NOT DOCUMENT: HCPCS | Performed by: FAMILY MEDICINE

## 2020-05-18 PROCEDURE — 1111F DSCHRG MED/CURRENT MED MERGE: CPT | Performed by: FAMILY MEDICINE

## 2020-05-18 PROCEDURE — G8419 CALC BMI OUT NRM PARAM NOF/U: HCPCS | Performed by: FAMILY MEDICINE

## 2020-05-18 PROCEDURE — 4004F PT TOBACCO SCREEN RCVD TLK: CPT | Performed by: FAMILY MEDICINE

## 2020-05-18 PROCEDURE — 3017F COLORECTAL CA SCREEN DOC REV: CPT | Performed by: FAMILY MEDICINE

## 2020-05-18 RX ORDER — NICOTINE 21 MG/24HR
1 PATCH, TRANSDERMAL 24 HOURS TRANSDERMAL DAILY
Qty: 42 PATCH | Refills: 0 | Status: SHIPPED
Start: 2020-05-18 | End: 2020-11-03

## 2020-05-18 RX ORDER — METOPROLOL SUCCINATE 25 MG/1
25 TABLET, EXTENDED RELEASE ORAL DAILY
Qty: 90 TABLET | Refills: 1 | Status: SHIPPED
Start: 2020-05-18 | End: 2020-11-23 | Stop reason: SDUPTHER

## 2020-05-18 ASSESSMENT — ENCOUNTER SYMPTOMS
WHEEZING: 0
DIARRHEA: 0
BLOOD IN STOOL: 0
COUGH: 0
SHORTNESS OF BREATH: 0
CONSTIPATION: 0

## 2020-05-18 NOTE — PROGRESS NOTES
Post-Discharge Transitional Care Management Services or Hospital Follow Up      Blaire Melba   YOB: 1965    Date of Office Visit:  5/18/2020  Date of Hospital Admission: 5/12/20  Date of Hospital Discharge: 5/13/20  Readmission Risk Score(high >=14%. Medium >=10%):Readmission Risk Score: 6      Care management risk score Rising risk (score 2-5) and Complex Care (Scores >=6): 5     Non face to face  following discharge, date last encounter closed (first attempt may have been earlier): 5/14/2020  1:12 PM 5/14/2020  1:12 PM    Call initiated 2 business days of discharge: Yes     Patient Active Problem List   Diagnosis    DJD (degenerative joint disease) of knee    Medial meniscus tear    Elbow arthritis    DDD (degenerative disc disease), lumbar    Elbow pain    Lateral epicondylitis    [facility]oxysmal SVT [facility]ra[facility] tachycardia) [facility]    Palpitations    Primary osteoarthritis of left knee    Mixed hyperlipidemia    Vitamin D deficiency    Bronchitis, acute, with bronchospasm    Exertional dyspnea    Primary osteoarthritis of right knee    Acute medial meniscal tear    Complex tear of lateral meniscus of right knee as current injury    Synovitis    Chronic obstructive pulmonary disease ([address]    Atypical chest pain    Symptomatic cholelithiasis    Gastroesophageal reflux disease without esophagitis    Chest pain       Allergies   Allergen Reactions    Cortisone Other (See Comments)     Redness and breaking into a sweat.     Medrol [Methylprednisolone]      Fever; redness; sweat    Oxycontin [Oxycodone Hcl] Itching    Penicillins      Cant remember reaction was from childhood       Medications listed as ordered at the time of discharge from hospital   Jacqui River   Home Medication Instructions MACRINA:    Printed on:05/18/20 1035   Medication Information                      aspirin 81 MG chewable tablet  Take 1 tablet by mouth daily             atorvastatin (LIPITOR) 20 MG tablet  Take 1 tablet by mouth daily             Cholecalciferol (VITAMIN D3) 2000 units CAPS  Take 1 capsule by mouth daily             ibuprofen (ADVIL;MOTRIN) 800 MG tablet  Take 1 tablet by mouth every 6 hours as needed for Pain             metoprolol succinate (TOPROL XL) 25 MG extended release tablet  Take 1 tablet by mouth daily             metoprolol tartrate (LOPRESSOR) 25 MG tablet  As needed for palpitations             nicotine (NICODERM CQ) 21 MG/24HR  Place 1 patch onto the skin daily             pantoprazole (PROTONIX) 40 MG tablet  Take 1 tablet by mouth every morning (before breakfast)                   Medications marked \"taking\" at this time  Outpatient Medications Marked as Taking for the 5/18/20 encounter (Office Visit) with Mesfinarel[facility], DO   Medication Sig Dispense Refill    nicotine (NICODERM CQ) 21 MG/24HR Place 1 patch onto the skin daily 42 patch 0    metoprolol succinate (TOPROL XL) 25 MG extended release tablet Take 1 tablet by mouth daily 90 tablet 1    ibuprofen (ADVIL;MOTRIN) 800 MG tablet Take 1 tablet by mouth every 6 hours as needed for Pain 20 tablet 0    pantoprazole (PROTONIX) 40 MG tablet Take 1 tablet by mouth every morning (before breakfast) 30 tablet 3    Cholecalciferol (VITAMIN D3) 2000 units CAPS Take 1 capsule by mouth daily 30 capsule 3    atorvastatin (LIPITOR) 20 MG tablet Take 1 tablet by mouth daily 30 tablet 3        Medications patient taking as of now reconciled against medications ordered at time of hospital discharge: Yes    Chief Complaint   Patient presents with    Irregular Heart Beat     Was in ER for chest pain 5/12. Pt denies chest pain states she was in for tachycardia, arrtyhmia. was told to ask about metoprolol. has not scheduled follow up with Dr. Lyle Chapman Fatigue     weak and tired x2 days    Health Maintenance     colon: states she has cologuard at home.  mammo/pap: will call OB/GYN       HPI  Pt was admitted 5/12/20 for chest pain and SVT. She reports she felt fatigued and weak for two days after leaving the hospital. Would like to discuss Metoprolol. Inpatient course: Discharge summary reviewed- see chart. Interval history/Current status: stable    Review of Systems   Constitutional: Positive for fatigue. Negative for chills, diaphoresis and fever. HENT: Negative for ear discharge, ear pain, hearing loss, nosebleeds and tinnitus. Respiratory: Negative for cough, shortness of breath and wheezing. Cardiovascular: Negative for chest pain. Gastrointestinal: Negative for blood in stool, constipation and diarrhea. Genitourinary: Negative for dysuria, flank pain and hematuria. Skin: Negative for rash. Neurological: Negative for dizziness and headaches. Hematological: Does not bruise/bleed easily. Psychiatric/Behavioral: Negative for hallucinations and suicidal ideas. Vitals:    05/18/20 1002   BP: 116/72   Pulse: 79   Temp: 97.7 °F (36.5 °C)   TempSrc: Temporal   SpO2: 98%   Weight: 195 lb (88.5 kg)   Height: 5' 8\" (1.727 m)     Body mass index is 29.65 kg/m². Wt Readings from Last 3 Encounters:   05/18/20 195 lb (88.5 kg)   05/12/20 200 lb 12.8 oz (91.1 kg)   03/26/20 190 lb (86.2 kg)     BP Readings from Last 3 Encounters:   05/18/20 116/72   05/13/20 (!) 117/59   03/26/20 118/74       Physical Exam  Constitutional:       Appearance: Normal appearance. She is well-developed. HENT:      Head: Normocephalic and atraumatic. Eyes:      General: No scleral icterus. Conjunctiva/sclera: Conjunctivae normal.      Pupils: Pupils are equal, round, and reactive to light. Neck:      Musculoskeletal: Neck supple. Thyroid: No thyromegaly. Vascular: No JVD. Trachea: No tracheal deviation. Cardiovascular:      Rate and Rhythm: Normal rate and regular rhythm. Heart sounds: Normal heart sounds. No murmur. No gallop. Pulmonary:      Effort: Pulmonary effort is normal. No respiratory distress.

## 2020-05-21 ENCOUNTER — TELEPHONE (OUTPATIENT)
Dept: NON INVASIVE DIAGNOSTICS | Age: 55
End: 2020-05-21

## 2020-05-21 ENCOUNTER — CARE COORDINATION (OUTPATIENT)
Dept: CASE MANAGEMENT | Age: 55
End: 2020-05-21

## 2020-05-21 NOTE — CARE COORDINATION
[facility] Transitions Follow Up Call    2020    Patient: Doni River  Patient : 1965   MRN: <[id number]>  Reason for Admission: Chest Pain  Discharge Date: 20 RARS: Readmission Risk Score: 6         Spoke with: JOSE GARZA [facility] Transitions Subsequent and Final Call    Subsequent and Final Calls  Care Transitions Interventions  Other Interventions:          CTN spoke to Ramone Martin who stated she is trying to get appt with electro physiologist but has not been able to reach them. Pt went to Cardiologist f/u and has started new script for metoprolol succinate, was prescribed Nicoderm patches but not taking them. Pt agreed to have CTN call electro physiologist to attempt to schedule appt. Stated she has eye appt on Tuesday. Denies CP/pressure, palpitations, increased SOB. Pt placed CTN on hold, has another call, waited 10 minutes, no contact back. CTN called to schedule appt with Dr Nasrin Montes, informed office just called pt and scheduled VV on 20 at 11:30. Will continue to follow for BPCI-A.      Follow Up  Future Appointments   Date Time Provider Terry Cabrera   6/15/2020  2:00 PM Eron [facility] MD [facility]       Joanne Bone RN

## 2020-05-21 NOTE — TELEPHONE ENCOUNTER
Patient has virtual video visit on 5/27/2020 with Dr Kamlesh Oneill. We see that you have an appointment scheduled, given the current COVID-19 pandemic, Dr. Moris Jernigan would like to change that visit to a video visit. In order to schedule this appointment you will need a camera-enabled device (smart phone, tablet or computer) and reliable WiFi. Do you wish to schedule this appointment?    Yes  Patient verbalized understanding

## 2020-05-28 ENCOUNTER — TELEPHONE (OUTPATIENT)
Dept: SURGERY | Age: 55
End: 2020-05-28

## 2020-05-29 ENCOUNTER — CARE COORDINATION (OUTPATIENT)
Dept: CASE MANAGEMENT | Age: 55
End: 2020-05-29

## 2020-06-05 ENCOUNTER — CARE COORDINATION (OUTPATIENT)
Dept: CASE MANAGEMENT | Age: 55
End: 2020-06-05

## 2020-06-05 RX ORDER — ACETAMINOPHEN 160 MG
1 TABLET,DISINTEGRATING ORAL DAILY
Qty: 30 CAPSULE | Refills: 3 | Status: SHIPPED
Start: 2020-06-05 | End: 2020-10-26 | Stop reason: SDUPTHER

## 2020-06-05 RX ORDER — ATORVASTATIN CALCIUM 20 MG/1
20 TABLET, FILM COATED ORAL DAILY
Qty: 30 TABLET | Refills: 3 | Status: SHIPPED
Start: 2020-06-05 | End: 2020-10-26 | Stop reason: SDUPTHER

## 2020-06-12 ENCOUNTER — CARE COORDINATION (OUTPATIENT)
Dept: CASE MANAGEMENT | Age: 55
End: 2020-06-12

## 2020-06-12 NOTE — CARE COORDINATION
[facility] Transitions Follow Up Call    2020    Patient: Christy River  Patient : 1965   MRN: <[id number]>  Reason for Admission:   Discharge Date: 20 RARS: Readmission Risk Score: 6         Spoke with: Trip Michaels  Patient reports she is ok. Appetite and fluid intake good. Patient denies chest pain, rapid heart rate, palpitations, weakness, fever or chills. She does have sob. It is at baseline. Follows CDC COVID 19 prevention guidelines. Will continue to follow. Care Transitions Subsequent and Final Call    Subsequent and Final Calls  Do you have any ongoing symptoms?:  No  Have your medications changed?:  No  Do you have any questions related to your medications?:  No  Do you currently have any active services?:  No  Do you have any needs or concerns that I can assist you with?:  No  Identified Barriers:  None  Care Transitions Interventions  Other Interventions:             Follow Up  Future Appointments   Date Time Provider Terry Cabrera   2020  1:00 PM Berkley Franklin DO [facility]   2020 10:00 AM Marika Armendariz MD [place] [facility]       Mai May LPN

## 2020-06-26 ENCOUNTER — CARE COORDINATION (OUTPATIENT)
Dept: CASE MANAGEMENT | Age: 55
End: 2020-06-26

## 2020-06-30 ENCOUNTER — CARE COORDINATION (OUTPATIENT)
Dept: CASE MANAGEMENT | Age: 55
End: 2020-06-30

## 2020-07-07 ENCOUNTER — CARE COORDINATION (OUTPATIENT)
Dept: CASE MANAGEMENT | Age: 55
End: 2020-07-07

## 2020-07-08 ENCOUNTER — TELEPHONE (OUTPATIENT)
Dept: SURGERY | Age: 55
End: 2020-07-08

## 2020-07-08 NOTE — TELEPHONE ENCOUNTER
Patient wishes to cancel procedure until further notice due to having cardiac issues. She states that she will reschedule when she is cleared.     Electronically signed by Red Barrios MA on 7/8/2020 at 9:10 AM

## 2020-07-13 ENCOUNTER — CARE COORDINATION (OUTPATIENT)
Dept: CASE MANAGEMENT | Age: 55
End: 2020-07-13

## 2020-07-13 NOTE — CARE COORDINATION
Attempted to reach pt for BPCI-A follow up call. Left message requesting call back.     Ne Marrero RN  Care Transition Coordinator  [phone]

## 2020-07-14 ENCOUNTER — TELEPHONE (OUTPATIENT)
Dept: NON INVASIVE DIAGNOSTICS | Age: 55
End: 2020-07-14

## 2020-07-20 ENCOUNTER — CARE COORDINATION (OUTPATIENT)
Dept: CASE MANAGEMENT | Age: 55
End: 2020-07-20

## 2020-07-20 NOTE — CARE COORDINATION
[facility] Transitions Follow Up Call    2020    Patient: Shante River  Patient : 1965   MRN: <[id number]>  Reason for Admission:   Discharge Date: 20 RARS: Readmission Risk Score: 6    Spoke to: Jamel Escobar  Patient reports she is ok. Appetite and fluid intake good. Patient denies cp, fever, chills, weakness or fatigue. Has sob on exertion. Sometimes has palpitations, not very often. Patient states she has tried to have a virtual visit with her cardiologist but her phone didn't work. Writer suggested her trying someone else's phone. Writer also stressed the importance of patient having follow up visit. Patient said she would try using someone else's phone. No questions or concerns at this time. Will continue to follow. Care Transitions Subsequent and Final Call    Subsequent and Final Calls  Care Transitions Interventions  Other Interventions:             Follow Up  Future Appointments   Date Time Provider Terry Cabrera   2020 10:00 AM Darren Pacheco MD [facility]       Nain Alcantar LPN

## 2020-07-22 RX ORDER — PANTOPRAZOLE SODIUM 40 MG/1
40 TABLET, DELAYED RELEASE ORAL
Qty: 30 TABLET | Refills: 3 | Status: SHIPPED
Start: 2020-07-22 | End: 2020-10-26 | Stop reason: SDUPTHER

## 2020-07-27 NOTE — PROGRESS NOTES
[facility],[address] and Vascular [facility] Electrophysiology  Consultation Report  PATIENT: Purnima Henderson  MEDICAL RECORD NUMBER: [id number]  DATE OF SERVICE:  7/29/2020  ATTENDING ELECTROPHYSIOLOGIST: Caleb Chatterjee MD  REFERRING PHYSICIAN: Easton Diaz DO  CHIEF COMPLAINT: Supraventricular tachycardia    HPI: This is a 47 y.o. female with a history of   Patient Active Problem List   Diagnosis    DJD (degenerative joint disease) of knee    Medial meniscus tear    Elbow arthritis    DDD (degenerative disc disease), lumbar    Elbow pain    Lateral epicondylitis    Paroxysmal SVT (supraventricular tachycardia) (HCC)    Palpitations    Primary osteoarthritis of left knee    Mixed hyperlipidemia    Vitamin D deficiency    Bronchitis, acute, with bronchospasm    Exertional dyspnea    Primary osteoarthritis of right knee    Acute medial meniscal tear    Complex tear of lateral meniscus of right knee as current injury    Synovitis    Chronic obstructive pulmonary disease ([facility] [address]    Atypical chest pain    Symptomatic cholelithiasis    Gastroesophageal reflux disease without esophagitis    Chest pain   who presents to cardiac electrophysiology clinic for consultation of SVT. The patient has had history of SVT diagnosed [id number]. She reports having around 1-2 episodes a year but was only given Adenosine twice in her lifetime. She presented to the hospital in May 2020 due to palpitations and chest pain and was noted to be in NCT with short RP. She was given IV Adenosine 6 and then 12 mg which converted the rhythm back to NSR. Prior to her last ER visit she was not taking the Toprol regularly due to hypotension, but currently taking Toprol XL daily since May 2020. Today she presents in NSR. The patient denies any chest pain, dyspnea, palpitations, dizziness, syncope, orthopnea or paroxysmal nocturnal dyspnea.  She does report that her grandfather passed away suddenly from a MI. We discussed about rhythm control options including medication for suppression versus catheter ablation, explaining the risks and benefits as outlined below. She opts to continue the same medication regiment.      Patient Active Problem List    Diagnosis Date Noted    Chest pain 05/12/2020    Symptomatic cholelithiasis     Gastroesophageal reflux disease without esophagitis     Chronic obstructive pulmonary disease ([facility] [address] 11/12/2018    Atypical chest pain 11/12/2018    Complex tear of lateral meniscus of right knee as current injury 05/05/2017    Synovitis 05/05/2017    Primary osteoarthritis of right knee 03/13/2017    Acute medial meniscal tear 03/13/2017    Exertional dyspnea 12/06/2016    Bronchitis, acute, with bronchospasm 11/07/2016    Mixed hyperlipidemia 10/25/2016    Vitamin D deficiency 10/25/2016    Primary osteoarthritis of left knee 05/24/2016    Paroxysmal SVT (supraventricular tachycardia) (HCC)     Palpitations     Elbow pain 02/26/2015    Lateral epicondylitis 02/26/2015    DDD (degenerative disc disease), lumbar 11/03/2014    Elbow arthritis 08/26/2014    DJD (degenerative joint disease) of knee 07/18/2014    Medial meniscus tear 07/18/2014       Past Medical History:   Diagnosis Date    At high risk for deep venous thrombosis     Cancer ([facility] [address]     cervical    COPD (chronic obstructive pulmonary disease) (HCC)     early stage    DJD (degenerative joint disease) of knee 7/18/2014    GERD (gastroesophageal reflux disease)     H/O cardiovascular stress test 05/13/2020    Lexiscan    Headache     Hiatal hernia     Mixed hyperlipidemia 10/25/2016    SVT (supraventricular tachycardia) (HCC)     had episode of SVT 6 months ago and several small episode over last couple months       Family History   Problem Relation Age of Onset    High Blood Pressure Mother     High Blood Pressure Father        Social History     Tobacco Use    Smoking status: Current Every Day Smoker     Packs/day: 1.00     Years: 30.00     Pack years: 30.00     Types: Cigarettes    Smokeless tobacco: Never Used   Substance Use Topics    Alcohol use: Yes     Comment: rarely       Current Outpatient Medications   Medication Sig Dispense Refill    pantoprazole (PROTONIX) 40 MG tablet Take 1 tablet by mouth every morning (before breakfast) 30 tablet 3    atorvastatin (LIPITOR) 20 MG tablet Take 1 tablet by mouth daily 30 tablet 3    Cholecalciferol (VITAMIN D3) 50 MCG (2000 UT) CAPS Take 1 capsule by mouth daily 30 capsule 3    metoprolol succinate (TOPROL XL) 25 MG extended release tablet Take 1 tablet by mouth daily 90 tablet 1    aspirin 81 MG chewable tablet Take 1 tablet by mouth daily 30 tablet 3    ibuprofen (ADVIL;MOTRIN) 800 MG tablet Take 1 tablet by mouth every 6 hours as needed for Pain 20 tablet 0    nicotine (NICODERM CQ) 21 MG/24HR Place 1 patch onto the skin daily (Patient not taking: Reported on 7/29/2020) 42 patch 0     No current facility-administered medications for this visit. Allergies   Allergen Reactions    Cortisone Other (See Comments)     Redness and breaking into a sweat.  Medrol [Methylprednisolone]      Fever; redness; sweat    Oxycontin [Oxycodone Hcl] Itching    Penicillins      Cant remember reaction was from childhood       ROS:   Constitutional: Negative for fever, activity change and appetite change. HENT: Negative for epistaxis. Eyes: Negative for diploplia, blurred vision. Respiratory: Negative for cough, chest tightness, shortness of breath and wheezing. Cardiovascular: pertinent positives in HPI  Gastrointestinal: Negative for abdominal pain and blood in stool.    All other review of systems are negative     PHYSICAL EXAM:   Vitals:    07/29/20 1305   BP: 104/76   Pulse: 59   Resp: 20   Weight: 199 lb (90.3 kg)   Height: 5' 8\" (1.727 m)      Constitutional: Well-developed, no acute distress  Eyes: conjunctivae normal, no xanthelasma Ears, Nose, Throat: oral mucosa moist, no cyanosis   CV: no JVD. Regular rate and rhythm. Normal S1S2 and no S3. No murmurs, rubs, or gallops. PMI is nondisplaced  Lungs: clear to auscultation bilaterally, normal respiratory effort without used of accessory muscles  Abdomen: soft, non-tender, bowel sounds present, no masses or hepatomegaly   Musculoskeletal: no digital clubbing, no edema   Skin: warm, no rashes     I have personally reviewed the laboratory, cardiac diagnostic and radiographic testing as outlined below:    Data:    No results for input(s): WBC, HGB, HCT, PLT in the last 72 hours. No results for input(s): NA, K, CL, CO2, BUN, CREATININE, CALCIUM in the last 72 hours. Invalid input(s): GLU, MAGNESIUM   Lab Results   Component Value Date    MG 2.2 2020     No results for input(s): TSH in the last 72 hours. No results for input(s): INR in the last 72 hours. CXR 20:   FINDINGS:    Slight elevation of the right hemidiaphragm.  Clear lungs.  No definite    findings of pneumothorax or pleural effusion.  Normal mediastinal, hilar, and    cardiac contours.  No acute fracture.              Impression    No acute cardiopulmonary process.           EK20: SR, rate: 89 bpm, PRWP, low voltage, QTc 416 msec. No delta wave. - Please see scan in Cardiology. Echocardiogram 20:   Findings      Left Ventricle   Ejection fraction is visually estimated at 55%. No regional wall motion   abnormalities seen. Mild left ventricular concentric hypertrophy noted. Left ventricle size is normal. Normal left ventricular diastolic filling   pattern for age. Right Ventricle   Normal right ventricle structure and function. Left Atrium   Normal left atrium. Right Atrium   Normal right atrium. Mitral Valve   Structurally normal mitral valve. No evidence of mitral valve stenosis. Physiologic and/or trace mitral regurgitation is present.       Tricuspid Valve   The tricuspid valve appears structurally normal.   Physiologic and/or trace tricuspid regurgitation. Aortic Valve   The aortic valve is trileaflet. No hemodynamically significant aortic   stenosis is present. No evidence of aortic valve regurgitation. Pulmonic Valve   Pulmonic valve is structurally normal. Physiologic and/or trace pulmonic   regurgitation present. Pericardial Effusion   No evidence for hemodynamically significant pericardial effusion. Aorta   Normal aortic root and ascending aorta. Miscellaneous   The inferior vena cava diameter is normal with normal respiratory   variation. Conclusions      Summary   Ejection fraction is visually estimated at 55%. No regional wall motion abnormalities seen. Mild left ventricular concentric hypertrophy noted. Normal right ventricle structure and function. Physiologic and/or trace mitral regurgitation is present.       Signature      ----------------------------------------------------------------   Electronically signed by Rob Rodriguez DO(Interpreting   physician) on 05/13/2020 11:03 AM   ----------------------------------------------------------------     M-Mode/2D Measurements & Calculations      LV Diastolic     LV Systolic Dimension:    AV Cusp Separation: 2.2 cmLA   Dimension: 4.8   3.5 cm                    Dimension: 3.2 cmAO Root   cm               LV Volume Diastolic:      Dimension: 3.3 cm   LV FS:27.1 %     107.2 ml   LV PW Diastolic: LV Volume Systolic: 63.9   1.1 cm           ml   Septum           LV EDV/LV EDV Index:      RV Diastolic Dimension: 2.2 cm   Diastolic: 1.2   380.9 mlLV ESV/LV ESV   cm               Index: 49.4 ml   CO: 4.79 l/min   EF Calculated: 53.9 %     LA volume/Index: 28.7 ml   LV Mass: 206.37   g                       LVOT: 2 cm     Doppler Measurements & Calculations      MV Peak E-Wave: 0.71 AV Peak Velocity:     LVOT Peak Velocity: 1.03 m/s   m/s                  1.25 m/s              LVOT Mean Velocity: 0.75 m/s MV Peak A-Wave: 0.61 AV Peak Gradient:     LVOT Peak Gradient: 4.3   m/s                  6.24 mmHg             mmHgLVOT Mean Gradient: 2.5   MV E/A Ratio: 1.18   AV Mean Velocity:     mmHg   MV Peak Gradient:    0.93 m/s   3.5 mmHg             AV Mean Gradient: 3.8   MV Mean Gradient:    mmHg   1.6 mmHg             AV VTI: 28 cm         TR Velocity:1.92 m/s   MV Mean Velocity:    AV Area               TR Gradient:14.78 mmHg   0.59 m/s             (Continuity):2.44     PV Peak Velocity: 1.04 m/s   MV Deceleration      cm^2                  PV Peak Gradient: 4.34 mmHg   Time: 203.3 msec                           PV Mean Velocity: 0.75 m/s   MV P1/2t: 71.6 msec  LVOT VTI: 21.8 cm     PV Mean Gradient: 2.5 mmHg   MVA by PHT:3.07 cm^2 IVRT: 96.9 msec   MV Area   (continuity): 3 cm^2    Stress Test 5/13/20:  FINDINGS:    Perfusion:         Image quality is good with no significant attenuation artifact.         There are no fixed or reversible perfusion defects.         Summed stress score:  0         Summed rest score:  0         Summed reversibility score:  0         Scores are visually adjusted for potential artifact.         TID score:  1.28 (threshold value of 1.39 is used for Lexiscan stress with    Tc-99m)         Function:         End diastolic volume:  06RL         Left ventricular ejection fraction:  Greater than 70%         Wall motion abnormalities:  None.              Impression    Perfusion:  Normal exam         Function:  Normal exam      Outpatient Monitor 11/20/15: I have independently reviewed all of the ECGs and rhythm strips per above     Assessment/Plan:  This is a 47 y.o. female with a history of   Patient Active Problem List   Diagnosis    DJD (degenerative joint disease) of knee    Medial meniscus tear    Elbow arthritis    DDD (degenerative disc disease), lumbar    Elbow pain    Lateral epicondylitis    Paroxysmal SVT (supraventricular tachycardia) (HCC)    Palpitations    Primary osteoarthritis of left knee    Mixed hyperlipidemia    Vitamin D deficiency    Bronchitis, acute, with bronchospasm    Exertional dyspnea    Primary osteoarthritis of right knee    Acute medial meniscal tear    Complex tear of lateral meniscus of right knee as current injury    Synovitis    Chronic obstructive pulmonary disease (HCC)    Atypical chest pain    Symptomatic cholelithiasis    Gastroesophageal reflux disease without esophagitis    Chest pain    who presents with PSVT. 1. Supraventricular tachycardia  - Narrow complex with short RP.  - Terminated with IV Adenosine.  - Reviewed vagal maneuvers. - On Toprol XL and no recurrent SVT since. - I explained the pathophysiology of this condition, as well as the possibilities of AVNRT(60%),  AVRT(30%) or atrial tachycardia (10%) as the etiology of the patient's arrhythmias. I also gave the patient the options of continued medical therapy versus EP study and catheter ablation, with the latter being the more likely approach to achieve a long-term cure without the need for medications. The risks, benefits, and alternatives to EP study and catheter ablation were reviewed in detail today, including bleeding, blood clot, infection, vascular injury required surgical repair, a low but definite risk of AV block requiring permanent pacemaker implantation with AVNRT ablation, and heart attack, stroke, bleeding, and death associated with left atrial ablation. The patient understands these risks and wishes to defer  EP study and ablation at present. 2. Hyperlipidemia  - On Lipitor. 3. GERD  - On Protonix. 4. Vitamin D deficiency  - On Vitamin D3.    5. Cigarette smoking  - Advised cessation. Recommendations:  1. Continue Toprol XL 25 mg daily. 2. Patient to consider electrophysiology study with RF ablation of SVT  3. No need to take ASA for PSVT. 4. Avoid caffeine in take. 5. Follow up in 3 months.  Encouraged the patient to call the office for any questions or concerns. I have spent a total of 60 minutes with the patient and the family reviewing the above stated recommendations. And a total of >50% of that time involved face-to-face time providing counseling and or coordination of care with the other providers. Thank you for allowing me to participate in your patient's care. Please call me if there are any questions or concerns.       Mirela Leblanc MD  Cardiac Electrophysiology  [address]  [facility] [place]: [facility]  1:18 PM  7/29/2020

## 2020-07-29 ENCOUNTER — OFFICE VISIT (OUTPATIENT)
Dept: NON INVASIVE DIAGNOSTICS | Age: 55
End: 2020-07-29
Payer: MEDICARE

## 2020-07-29 ENCOUNTER — CARE COORDINATION (OUTPATIENT)
Dept: CASE MANAGEMENT | Age: 55
End: 2020-07-29

## 2020-07-29 VITALS
BODY MASS INDEX: 30.16 KG/M2 | HEART RATE: 59 BPM | DIASTOLIC BLOOD PRESSURE: 76 MMHG | WEIGHT: 199 LBS | RESPIRATION RATE: 20 BRPM | HEIGHT: 68 IN | SYSTOLIC BLOOD PRESSURE: 104 MMHG

## 2020-07-29 PROCEDURE — 4004F PT TOBACCO SCREEN RCVD TLK: CPT | Performed by: INTERNAL MEDICINE

## 2020-07-29 PROCEDURE — 99205 OFFICE O/P NEW HI 60 MIN: CPT | Performed by: INTERNAL MEDICINE

## 2020-07-29 PROCEDURE — G8427 DOCREV CUR MEDS BY ELIG CLIN: HCPCS | Performed by: INTERNAL MEDICINE

## 2020-07-29 PROCEDURE — G8417 CALC BMI ABV UP PARAM F/U: HCPCS | Performed by: INTERNAL MEDICINE

## 2020-07-29 PROCEDURE — 3017F COLORECTAL CA SCREEN DOC REV: CPT | Performed by: INTERNAL MEDICINE

## 2020-07-29 PROCEDURE — 93000 ELECTROCARDIOGRAM COMPLETE: CPT | Performed by: INTERNAL MEDICINE

## 2020-07-29 NOTE — CARE COORDINATION
[facility] Transitions Follow Up Call    2020    Patient: Alexa River  Patient : 1965   MRN: <[id number]>  Reason for Admission:   Discharge Date: 20 RARS: Readmission Risk Score: 6         Spoke with: JOSE GARZA [facility] Transitions Subsequent and Final Call    Subsequent and Final Calls  Do you have any ongoing symptoms?:  Yes  Patient-reported symptoms:  Shortness of Breath  Interventions for patient-reported symptoms:  Notified PCP/Physician  Have your medications changed?:  No  Do you have any questions related to your medications?:  No  Do you currently have any active services?:  No  Do you have any needs or concerns that I can assist you with?:  No  Care Transitions Interventions  Other Interventions:        States she's on her way to an appt with an EP doctor. Denies CP or palpitations. States she has ongoing SOB. Reports taking medications as prescribed and denies questions or concerns at this time.     Follow Up  Future Appointments   Date Time Provider Terry Cabrera   2020  1:00 PM Keshawn Henning MD ELECTRO [facility]       Porfirio Walker

## 2020-08-05 ENCOUNTER — HOSPITAL ENCOUNTER (EMERGENCY)
Age: 55
Discharge: HOME | End: 2020-08-05
Attending: EMERGENCY MEDICINE
Payer: MEDICARE

## 2020-08-05 ENCOUNTER — CARE COORDINATION (OUTPATIENT)
Dept: CASE MANAGEMENT | Age: 55
End: 2020-08-05

## 2020-08-05 ENCOUNTER — APPOINTMENT (OUTPATIENT)
Dept: CT IMAGING | Age: 55
End: 2020-08-05
Payer: MEDICARE

## 2020-08-05 VITALS
WEIGHT: 180 LBS | OXYGEN SATURATION: 96 % | HEIGHT: 68 IN | SYSTOLIC BLOOD PRESSURE: 122 MMHG | HEART RATE: 96 BPM | DIASTOLIC BLOOD PRESSURE: 72 MMHG | RESPIRATION RATE: 18 BRPM | BODY MASS INDEX: 27.28 KG/M2 | TEMPERATURE: 98.5 F

## 2020-08-05 LAB
ALBUMIN SERPL-MCNC: 4.6 G/DL (ref 3.5–5.2)
ALP BLD-CCNC: 100 U/L (ref 35–104)
ALT SERPL-CCNC: 27 U/L (ref 0–32)
ANION GAP SERPL CALCULATED.3IONS-SCNC: 14 MMOL/L (ref 7–16)
AST SERPL-CCNC: 22 U/L (ref 0–31)
BACTERIA: NORMAL /HPF
BASOPHILS ABSOLUTE: 0.02 E9/L (ref 0–0.2)
BASOPHILS RELATIVE PERCENT: 0.2 % (ref 0–2)
BILIRUB SERPL-MCNC: 0.4 MG/DL (ref 0–1.2)
BILIRUBIN URINE: NEGATIVE
BLOOD, URINE: ABNORMAL
BUN BLDV-MCNC: 8 MG/DL (ref 6–20)
CALCIUM SERPL-MCNC: 9.5 MG/DL (ref 8.6–10.2)
CHLORIDE BLD-SCNC: 100 MMOL/L (ref 98–107)
CLARITY: CLEAR
CO2: 24 MMOL/L (ref 22–29)
COLOR: YELLOW
CREAT SERPL-MCNC: 0.6 MG/DL (ref 0.5–1)
EOSINOPHILS ABSOLUTE: 0.06 E9/L (ref 0.05–0.5)
EOSINOPHILS RELATIVE PERCENT: 0.6 % (ref 0–6)
EPITHELIAL CELLS, UA: NORMAL /HPF
GFR AFRICAN AMERICAN: >60
GFR NON-AFRICAN AMERICAN: >60 ML/MIN/1.73
GLUCOSE BLD-MCNC: 95 MG/DL (ref 74–99)
GLUCOSE URINE: NEGATIVE MG/DL
HCT VFR BLD CALC: 42.5 % (ref 34–48)
HEMOGLOBIN: 14.4 G/DL (ref 11.5–15.5)
IMMATURE GRANULOCYTES #: 0.04 E9/L
IMMATURE GRANULOCYTES %: 0.4 % (ref 0–5)
KETONES, URINE: NEGATIVE MG/DL
LACTIC ACID: 0.8 MMOL/L (ref 0.5–2.2)
LEUKOCYTE ESTERASE, URINE: NEGATIVE
LYMPHOCYTES ABSOLUTE: 1.36 E9/L (ref 1.5–4)
LYMPHOCYTES RELATIVE PERCENT: 13.2 % (ref 20–42)
MCH RBC QN AUTO: 31.9 PG (ref 26–35)
MCHC RBC AUTO-ENTMCNC: 33.9 % (ref 32–34.5)
MCV RBC AUTO: 94.2 FL (ref 80–99.9)
MONOCYTES ABSOLUTE: 0.62 E9/L (ref 0.1–0.95)
MONOCYTES RELATIVE PERCENT: 6 % (ref 2–12)
NEUTROPHILS ABSOLUTE: 8.17 E9/L (ref 1.8–7.3)
NEUTROPHILS RELATIVE PERCENT: 79.6 % (ref 43–80)
NITRITE, URINE: NEGATIVE
PDW BLD-RTO: 12.3 FL (ref 11.5–15)
PH UA: 6 (ref 5–9)
PLATELET # BLD: 172 E9/L (ref 130–450)
PMV BLD AUTO: 11 FL (ref 7–12)
POTASSIUM REFLEX MAGNESIUM: 4.1 MMOL/L (ref 3.5–5)
PROTEIN UA: NEGATIVE MG/DL
RBC # BLD: 4.51 E12/L (ref 3.5–5.5)
RBC UA: NORMAL /HPF (ref 0–2)
SODIUM BLD-SCNC: 138 MMOL/L (ref 132–146)
SPECIFIC GRAVITY UA: 1.01 (ref 1–1.03)
TOTAL PROTEIN: 7.3 G/DL (ref 6.4–8.3)
UROBILINOGEN, URINE: 0.2 E.U./DL
WBC # BLD: 10.3 E9/L (ref 4.5–11.5)
WBC UA: NORMAL /HPF (ref 0–5)

## 2020-08-05 PROCEDURE — 99285 EMERGENCY DEPT VISIT HI MDM: CPT

## 2020-08-05 PROCEDURE — 85025 COMPLETE CBC W/AUTO DIFF WBC: CPT

## 2020-08-05 PROCEDURE — 6370000000 HC RX 637 (ALT 250 FOR IP): Performed by: GENERAL PRACTICE

## 2020-08-05 PROCEDURE — 96374 THER/PROPH/DIAG INJ IV PUSH: CPT

## 2020-08-05 PROCEDURE — 87088 URINE BACTERIA CULTURE: CPT

## 2020-08-05 PROCEDURE — 2580000003 HC RX 258: Performed by: GENERAL PRACTICE

## 2020-08-05 PROCEDURE — 74176 CT ABD & PELVIS W/O CONTRAST: CPT

## 2020-08-05 PROCEDURE — 81001 URINALYSIS AUTO W/SCOPE: CPT

## 2020-08-05 PROCEDURE — 83605 ASSAY OF LACTIC ACID: CPT

## 2020-08-05 PROCEDURE — 80053 COMPREHEN METABOLIC PANEL: CPT

## 2020-08-05 PROCEDURE — 6360000002 HC RX W HCPCS: Performed by: GENERAL PRACTICE

## 2020-08-05 RX ORDER — 0.9 % SODIUM CHLORIDE 0.9 %
1000 INTRAVENOUS SOLUTION INTRAVENOUS ONCE
Status: COMPLETED | OUTPATIENT
Start: 2020-08-05 | End: 2020-08-05

## 2020-08-05 RX ORDER — ONDANSETRON 4 MG/1
4 TABLET, FILM COATED ORAL DAILY PRN
Qty: 30 TABLET | Refills: 0 | Status: SHIPPED | OUTPATIENT
Start: 2020-08-05 | End: 2020-10-06 | Stop reason: ALTCHOICE

## 2020-08-05 RX ORDER — METRONIDAZOLE 500 MG/1
500 TABLET ORAL 3 TIMES DAILY
Qty: 21 TABLET | Refills: 0 | Status: SHIPPED | OUTPATIENT
Start: 2020-08-05 | End: 2020-08-12

## 2020-08-05 RX ORDER — METRONIDAZOLE 500 MG/1
500 TABLET ORAL ONCE
Status: COMPLETED | OUTPATIENT
Start: 2020-08-05 | End: 2020-08-05

## 2020-08-05 RX ORDER — KETOROLAC TROMETHAMINE 15 MG/ML
15 INJECTION, SOLUTION INTRAMUSCULAR; INTRAVENOUS ONCE
Status: COMPLETED | OUTPATIENT
Start: 2020-08-05 | End: 2020-08-05

## 2020-08-05 RX ORDER — FENTANYL CITRATE 50 UG/ML
75 INJECTION, SOLUTION INTRAMUSCULAR; INTRAVENOUS ONCE
Status: DISCONTINUED | OUTPATIENT
Start: 2020-08-05 | End: 2020-08-05

## 2020-08-05 RX ORDER — CIPROFLOXACIN 500 MG/1
500 TABLET, FILM COATED ORAL 2 TIMES DAILY
Qty: 14 TABLET | Refills: 0 | Status: SHIPPED | OUTPATIENT
Start: 2020-08-05 | End: 2020-08-12

## 2020-08-05 RX ORDER — HYDROCODONE BITARTRATE AND ACETAMINOPHEN 5; 325 MG/1; MG/1
1 TABLET ORAL ONCE
Status: COMPLETED | OUTPATIENT
Start: 2020-08-05 | End: 2020-08-05

## 2020-08-05 RX ORDER — HYDROCODONE BITARTRATE AND ACETAMINOPHEN 5; 325 MG/1; MG/1
1 TABLET ORAL EVERY 4 HOURS PRN
Qty: 12 TABLET | Refills: 0 | Status: SHIPPED | OUTPATIENT
Start: 2020-08-05 | End: 2020-08-08

## 2020-08-05 RX ORDER — CIPROFLOXACIN 500 MG/1
500 TABLET, FILM COATED ORAL ONCE
Status: COMPLETED | OUTPATIENT
Start: 2020-08-05 | End: 2020-08-05

## 2020-08-05 RX ADMIN — HYDROCODONE BITARTRATE AND ACETAMINOPHEN 1 TABLET: 5; 325 TABLET ORAL at 23:09

## 2020-08-05 RX ADMIN — METRONIDAZOLE 500 MG: 500 TABLET ORAL at 23:09

## 2020-08-05 RX ADMIN — CIPROFLOXACIN 500 MG: 500 TABLET, FILM COATED ORAL at 23:09

## 2020-08-05 RX ADMIN — SODIUM CHLORIDE 1000 ML: 9 INJECTION, SOLUTION INTRAVENOUS at 21:36

## 2020-08-05 RX ADMIN — KETOROLAC TROMETHAMINE 15 MG: 15 INJECTION, SOLUTION INTRAMUSCULAR; INTRAVENOUS at 21:25

## 2020-08-05 ASSESSMENT — PAIN SCALES - GENERAL
PAINLEVEL_OUTOF10: 10
PAINLEVEL_OUTOF10: 9
PAINLEVEL_OUTOF10: 7
PAINLEVEL_OUTOF10: 9

## 2020-08-05 ASSESSMENT — ENCOUNTER SYMPTOMS
SORE THROAT: 0
DIARRHEA: 0
COUGH: 0
CHEST TIGHTNESS: 0
BACK PAIN: 1
CHOKING: 0
WHEEZING: 0
EYE PAIN: 0
SINUS PAIN: 0
ABDOMINAL PAIN: 0
SHORTNESS OF BREATH: 0
VOMITING: 0
NAUSEA: 0

## 2020-08-05 ASSESSMENT — PAIN DESCRIPTION - PAIN TYPE
TYPE: ACUTE PAIN
TYPE: ACUTE PAIN

## 2020-08-05 ASSESSMENT — PAIN DESCRIPTION - ONSET: ONSET: ON-GOING

## 2020-08-05 ASSESSMENT — PAIN DESCRIPTION - FREQUENCY: FREQUENCY: CONTINUOUS

## 2020-08-05 ASSESSMENT — PAIN DESCRIPTION - LOCATION
LOCATION: FLANK
LOCATION: FLANK

## 2020-08-05 ASSESSMENT — PAIN DESCRIPTION - PROGRESSION: CLINICAL_PROGRESSION: NOT CHANGED

## 2020-08-05 ASSESSMENT — PAIN DESCRIPTION - DESCRIPTORS: DESCRIPTORS: SHARP;SHOOTING

## 2020-08-05 ASSESSMENT — PAIN DESCRIPTION - ORIENTATION
ORIENTATION: RIGHT
ORIENTATION: RIGHT

## 2020-08-05 ASSESSMENT — PAIN - FUNCTIONAL ASSESSMENT: PAIN_FUNCTIONAL_ASSESSMENT: PREVENTS OR INTERFERES WITH ALL ACTIVE AND SOME PASSIVE ACTIVITIES

## 2020-08-05 NOTE — CARE COORDINATION
[facility] Transitions Follow Up Call    2020    Patient: Flores River  Patient : 1965   MRN: [id number]  Reason for Admission:   Discharge Date: 20 RARS: Readmission Risk Score: 6    Follow Up: Attempted to contact patient for final BPCI-A follow up. Unable to reach patient. Left message with contact information and request for call back. No future appointments.     Alex Rey RN

## 2020-08-06 NOTE — ED NOTES
Eliza Dobbins is a 51-year-old female who presents with a chief complaint of left-sided flank pain. History comes primarily from the patient. Past medical history significant for SVT, COPD, GERD, prior history of cervical cancer, back surgery. Patient states that over the course the day she felt well until approximately 6:30 in the evening, at which point she developed back pain in the middle of her low back. Initially she did not think much of this symptom, however the pain began to radiate into her left leg and then wrap around into her left inguinal region and became progressively more sharp and spasmodic in nature. She states that she had increased urinary frequency associated with this pain. When her pain became intolerable she presented to [address] [place] [address] Floor emergency department for further evaluation and treatment. On arrival, she was assessed with history, physical exam, imaging studies, laboratory studies, EKG, vital signs. Her vital signs were notable on arrival for hypertension of 173/96 and a tachycardia of 120. She was afebrile. Review of Systems   Constitutional: Negative for appetite change, chills and fever. HENT: Negative for sinus pain and sore throat. Eyes: Negative for pain and visual disturbance. Respiratory: Negative for cough, choking, chest tightness, shortness of breath and wheezing. Cardiovascular: Negative for chest pain and palpitations. Gastrointestinal: Negative for abdominal pain, diarrhea, nausea and vomiting. Genitourinary: Positive for flank pain and frequency. Negative for difficulty urinating, dysuria and hematuria. Musculoskeletal: Positive for back pain. Negative for neck pain and neck stiffness. Skin: Negative for rash. Neurological: Negative for tremors, syncope and weakness. Psychiatric/Behavioral: Negative for confusion. Physical Exam  Constitutional:       General: She is not in acute distress. Appearance: She is well-developed. She is ill-appearing. She is not diaphoretic. HENT:      Head: Normocephalic and atraumatic. Right Ear: External ear normal.      Left Ear: External ear normal.      Nose: Nose normal.      Mouth/Throat:      Mouth: Mucous membranes are moist.      Pharynx: No oropharyngeal exudate. Eyes:      General:         Right eye: No discharge. Left eye: No discharge. Pupils: Pupils are equal, round, and reactive to light. Neck:      Musculoskeletal: Normal range of motion. Thyroid: No thyromegaly. Vascular: No JVD. Trachea: No tracheal deviation. Cardiovascular:      Rate and Rhythm: Normal rate and regular rhythm. Heart sounds: Normal heart sounds. No murmur. No friction rub. No gallop. Pulmonary:      Effort: No respiratory distress. Breath sounds: No stridor. No wheezing or rales. Abdominal:      General: There is no distension. Tenderness: There is no abdominal tenderness. There is left CVA tenderness. Musculoskeletal:         General: Swelling present. No deformity. Lymphadenopathy:      Cervical: No cervical adenopathy. Skin:     General: Skin is warm and dry. Capillary Refill: Capillary refill takes less than 2 seconds. Neurological:      General: No focal deficit present. Mental Status: She is alert and oriented to person, place, and time. Cranial Nerves: No cranial nerve deficit. Procedures     MDM  Number of Diagnoses or Management Options  Diverticulitis of colon:   Diagnosis management comments: The patient's emergency department work-up including history, physical exam, vital signs, laboratory studies, imaging studies and reevaluation after initial treatment are reassuring for discharge to home with close outpatient follow-up. Specifically, when his work-up was notable for a finding of uncomplicated diverticulitis without evidence of perforation.   Her symptoms were controlled with supportive care, her vital signs exam, medical decision making, and procedures and agree with all pertinent clinical information unless otherwise noted. I have also reviewed and agree with the past medical, family and social history unless otherwise noted. I have discussed this patient in detail with the resident, and provided the instruction and education regarding patient here complaining of an onset of crampy, spasmodic left flank pain rating to the left lower abdomen starting this evening with some urinary frequency but no hematuria or dysuria. No fevers, sweats or chills. No other abdominal pain. Does have a remote history of a kidney stone she states. .  My findings/plan: Patient laying the bed in no acute distress. She does appear uncomfortable during the exam and has mild left lateral to lower abdominal tenderness with no actual CVA tenderness. Heart rate slightly tachycardic. Lungs are clear and equal.  She refuses narcotic medications. [NC]      ED Course User Index  [NC] Juan C Swanson, DO       --------------------------------------------- PAST HISTORY ---------------------------------------------  Past Medical History:  has a past medical history of At high risk for deep venous thrombosis, Cancer ([facility] [address] COPD (chronic obstructive pulmonary disease) ([facility] [address] DJD (degenerative joint disease) of knee, GERD (gastroesophageal reflux disease), H/O cardiovascular stress test, Headache, Hiatal hernia, Mixed hyperlipidemia, and SVT (supraventricular tachycardia) ([facility] [address] Past Surgical History:  has a past surgical history that includes back surgery; knee surgery (Left); Tonsillectomy; Tubal ligation; Endometrial ablation; Knee arthroscopy (Right, 05/05/2017); Endometrial ablation; bone biopsy; Endoscopy, colon, diagnostic; Cholecystectomy, laparoscopic (N/A, 3/6/2020); and Upper gastrointestinal endoscopy (N/A, 3/6/2020). Social History:  reports that she has been smoking cigarettes.  She has a 30.00 pack-year smoking history. She has never used smokeless tobacco. She reports current alcohol use. She reports that she does not use drugs. Family History: family history includes High Blood Pressure in her father and mother. The patients home medications have been reviewed. Allergies: Cortisone; Medrol [methylprednisolone];  Oxycontin [oxycodone hcl]; and Penicillins    -------------------------------------------------- RESULTS -------------------------------------------------  Labs:  Results for orders placed or performed during the hospital encounter of 08/05/20   Urinalysis, reflex to microscopic   Result Value Ref Range    Color, UA Yellow Straw/Yellow    Clarity, UA Clear Clear    Glucose, Ur Negative Negative mg/dL    Bilirubin Urine Negative Negative    Ketones, Urine Negative Negative mg/dL    Specific Gravity, UA 1.010 1.005 - 1.030    Blood, Urine TRACE (A) Negative    pH, UA 6.0 5.0 - 9.0    Protein, UA Negative Negative mg/dL    Urobilinogen, Urine 0.2 <2.0 E.U./dL    Nitrite, Urine Negative Negative    Leukocyte Esterase, Urine Negative Negative   CBC Auto Differential   Result Value Ref Range    WBC 10.3 4.5 - 11.5 E9/L    RBC 4.51 3.50 - 5.50 E12/L    Hemoglobin 14.4 11.5 - 15.5 g/dL    Hematocrit 42.5 34.0 - 48.0 %    MCV 94.2 80.0 - 99.9 fL    MCH 31.9 26.0 - 35.0 pg    MCHC 33.9 32.0 - 34.5 %    RDW 12.3 11.5 - 15.0 fL    Platelets 767 311 - 487 E9/L    MPV 11.0 7.0 - 12.0 fL    Neutrophils % 79.6 43.0 - 80.0 %    Immature Granulocytes % 0.4 0.0 - 5.0 %    Lymphocytes % 13.2 (L) 20.0 - 42.0 %    Monocytes % 6.0 2.0 - 12.0 %    Eosinophils % 0.6 0.0 - 6.0 %    Basophils % 0.2 0.0 - 2.0 %    Neutrophils Absolute 8.17 (H) 1.80 - 7.30 E9/L    Immature Granulocytes # 0.04 E9/L    Lymphocytes Absolute 1.36 (L) 1.50 - 4.00 E9/L    Monocytes Absolute 0.62 0.10 - 0.95 E9/L    Eosinophils Absolute 0.06 0.05 - 0.50 E9/L    Basophils Absolute 0.02 0.00 - 0.20 E9/L   Comprehensive Metabolic Panel w/ Reflex to MG Result Value Ref Range    Sodium 138 132 - 146 mmol/L    Potassium reflex Magnesium 4.1 3.5 - 5.0 mmol/L    Chloride 100 98 - 107 mmol/L    CO2 24 22 - 29 mmol/L    Anion Gap 14 7 - 16 mmol/L    Glucose 95 74 - 99 mg/dL    BUN 8 6 - 20 mg/dL    CREATININE 0.6 0.5 - 1.0 mg/dL    GFR Non-African American >60 >=60 mL/min/1.73    GFR African American >60     Calcium 9.5 8.6 - 10.2 mg/dL    Total Protein 7.3 6.4 - 8.3 g/dL    Alb 4.6 3.5 - 5.2 g/dL    Total Bilirubin 0.4 0.0 - 1.2 mg/dL    Alkaline Phosphatase 100 35 - 104 U/L    ALT 27 0 - 32 U/L    AST 22 0 - 31 U/L   Lactic Acid, Plasma   Result Value Ref Range    Lactic Acid 0.8 0.5 - 2.2 mmol/L   Microscopic Urinalysis   Result Value Ref Range    WBC, UA NONE 0 - 5 /HPF    RBC, UA NONE 0 - 2 /HPF    Epithelial Cells, UA RARE /HPF    Bacteria, UA NONE SEEN None Seen /HPF       Radiology:  CT ABDOMEN PELVIS WO CONTRAST Additional Contrast? None    (Results Pending)       ------------------------- NURSING NOTES AND VITALS REVIEWED ---------------------------  Date / Time Roomed:  8/5/2020  8:55 PM  ED Bed Assignment:  06/06    The nursing notes within the ED encounter and vital signs as below have been reviewed. /72   Pulse 96   Temp 98.5 °F (36.9 °C) (Oral)   Resp 18   Ht 5' 8\" (1.727 m)   Wt 180 lb (81.6 kg)   SpO2 96%   BMI 27.37 kg/m²   Oxygen Saturation Interpretation: Normal      ------------------------------------------ PROGRESS NOTES ------------------------------------------  10:45 PM EDT  I have spoken with the patient and discussed todays results, in addition to providing specific details for the plan of care and counseling regarding the diagnosis and prognosis. Their questions are answered at this time and they are agreeable with the plan. I discussed at length with them reasons for immediate return here for re evaluation.  They will followup with their primary care provider for further evaluation and treatment in the next 2 days.      --------------------------------- ADDITIONAL PROVIDER NOTES ---------------------------------  At this time the patient is without objective evidence of an acute process requiring hospitalization or inpatient management. They have remained hemodynamically stable throughout their entire ED visit and are stable for discharge with outpatient follow-up. The plan has been discussed in detail and they are aware of the specific conditions for emergent return, as well as the importance of follow-up. New Prescriptions    CIPROFLOXACIN (CIPRO) 500 MG TABLET    Take 1 tablet by mouth 2 times daily for 7 days    HYDROCODONE-ACETAMINOPHEN (NORCO) 5-325 MG PER TABLET    Take 1 tablet by mouth every 4 hours as needed for Pain for up to 3 days. Intended supply: 3 days. Take lowest dose possible to manage pain    METRONIDAZOLE (FLAGYL) 500 MG TABLET    Take 1 tablet by mouth 3 times daily for 7 days    ONDANSETRON (ZOFRAN) 4 MG TABLET    Take 1 tablet by mouth daily as needed for Nausea or Vomiting       Diagnosis:  1. Diverticulitis of colon        Disposition:  Patient's disposition: Discharge to home  Patient's condition is stable. Christopher Romero  PGY-2  10:48 PM EDT         Oscar Burgos DO  Resident  08/05/20 [id number]

## 2020-08-08 LAB
ORGANISM: ABNORMAL
URINE CULTURE, ROUTINE: ABNORMAL

## 2020-08-08 NOTE — ED PROVIDER NOTES
Nam Choi DO    Resident    Emergency Medicine    ED Notes    Signed    Date of Service:  8/5/2020  8:47 PM                Signed              Show:Clear all  [x]Manual[x]Template[]Copied    Added by:  [x]Christopher Odell DO    []Sammy for details     Radha Duke is a 59-year-old female who presents with a chief complaint of left-sided flank pain. History comes primarily from the patient. Past medical history significant for SVT, COPD, GERD, prior history of cervical cancer, back surgery. Patient states that over the course the day she felt well until approximately 6:30 in the evening, at which point she developed back pain in the middle of her low back. Initially she did not think much of this symptom, however the pain began to radiate into her left leg and then wrap around into her left inguinal region and became progressively more sharp and spasmodic in nature. She states that she had increased urinary frequency associated with this pain. When her pain became intolerable she presented to [address] [place], [place] [id number] [address] emergency department for further evaluation and treatment. On arrival, she was assessed with history, physical exam, imaging studies, laboratory studies, EKG, vital signs. Her vital signs were notable on arrival for hypertension of 173/96 and a tachycardia of 120. She was afebrile.              Review of Systems   Constitutional: Negative for appetite change, chills and fever. HENT: Negative for sinus pain and sore throat. Eyes: Negative for pain and visual disturbance. Respiratory: Negative for cough, choking, chest tightness, shortness of breath and wheezing. Cardiovascular: Negative for chest pain and palpitations. Gastrointestinal: Negative for abdominal pain, diarrhea, nausea and vomiting. Genitourinary: Positive for flank pain and frequency. Negative for difficulty urinating, dysuria and hematuria. Musculoskeletal: Positive for back pain. Negative for neck pain and neck stiffness. Skin: Negative for rash. Neurological: Negative for tremors, syncope and weakness. Psychiatric/Behavioral: Negative for confusion.         Physical Exam  Constitutional:       General: She is not in acute distress. Appearance: She is well-developed. She is ill-appearing. She is not diaphoretic. HENT:      Head: Normocephalic and atraumatic. Right Ear: External ear normal.      Left Ear: External ear normal.      Nose: Nose normal.      Mouth/Throat:      Mouth: Mucous membranes are moist.      Pharynx: No oropharyngeal exudate. Eyes:      General:         Right eye: No discharge. Left eye: No discharge. Pupils: Pupils are equal, round, and reactive to light. Neck:      Musculoskeletal: Normal range of motion. Thyroid: No thyromegaly. Vascular: No JVD. Trachea: No tracheal deviation. Cardiovascular:      Rate and Rhythm: Normal rate and regular rhythm. Heart sounds: Normal heart sounds. No murmur. No friction rub. No gallop. Pulmonary:      Effort: No respiratory distress. Breath sounds: No stridor. No wheezing or rales. Abdominal:      General: There is no distension. Tenderness: There is no abdominal tenderness. There is left CVA tenderness. Musculoskeletal:         General: Swelling present. No deformity. Lymphadenopathy:      Cervical: No cervical adenopathy. Skin:     General: Skin is warm and dry. Capillary Refill: Capillary refill takes less than 2 seconds. Neurological:      General: No focal deficit present. Mental Status: She is alert and oriented to person, place, and time. Cranial Nerves: No cranial nerve deficit.          Procedures      MDM  Number of Diagnoses or Management Options  Diverticulitis of colon:   Diagnosis management comments:  The patient's emergency department work-up including history, physical exam, vital signs, laboratory studies, imaging studies and reevaluation after initial treatment are reassuring for discharge to home with close outpatient follow-up.        Specifically, when his work-up was notable for a finding of uncomplicated diverticulitis without evidence of perforation. Her symptoms were controlled with supportive care, her vital signs remained stable throughout the entirety of her emergency department stay, and she was amenable for outpatient follow-up with close observation by her primary care provider.     They were advised to return to the emergency department should they develop fever, chills, night sweats, nausea, vomiting, diarrhea, chest pain, shortness of breath, or worsening of their symptoms despite treatment from this emergency department visit. They were instructed to follow-up with their primary care provider in 2 days. This information was relayed to the patient who understood this plan of care and was amenable to the plan.            ED Course as of Aug 05 2116   Wed Aug 05, 2020   2115 ATTENDING PROVIDER ATTESTATION:      I have personally performed and/or participated in the history, exam, medical decision making, and procedures and agree with all pertinent clinical information unless otherwise noted.       I have also reviewed and agree with the past medical, family and social history unless otherwise noted.     I have discussed this patient in detail with the resident, and provided the instruction and education regarding patient here complaining of an onset of crampy, spasmodic left flank pain rating to the left lower abdomen starting this evening with some urinary frequency but no hematuria or dysuria. No fevers, sweats or chills. No other abdominal pain. Does have a remote history of a kidney stone she states. .  My findings/plan: Patient laying the bed in no acute distress. She does appear uncomfortable during the exam and has mild left lateral to lower abdominal tenderness with no actual CVA tenderness. Heart rate slightly tachycardic.   Lungs are clear and equal.  She refuses narcotic medications.            [NC]       ED Course User Index  [NC] Iftikhar Muller DO                ED Course as of Aug 05 2245   Wed Aug 05, 2020   2115 ATTENDING PROVIDER ATTESTATION:      I have personally performed and/or participated in the history, exam, medical decision making, and procedures and agree with all pertinent clinical information unless otherwise noted.       I have also reviewed and agree with the past medical, family and social history unless otherwise noted.     I have discussed this patient in detail with the resident, and provided the instruction and education regarding patient here complaining of an onset of crampy, spasmodic left flank pain rating to the left lower abdomen starting this evening with some urinary frequency but no hematuria or dysuria. No fevers, sweats or chills. No other abdominal pain. Does have a remote history of a kidney stone she states. .  My findings/plan: Patient laying the bed in no acute distress. She does appear uncomfortable during the exam and has mild left lateral to lower abdominal tenderness with no actual CVA tenderness. Heart rate slightly tachycardic. Lungs are clear and equal.  She refuses narcotic medications.            [NC]       ED Course User Index  [NC] Skip Quintanilla         --------------------------------------------- PAST HISTORY ---------------------------------------------  Past Medical History:  has a past medical history of At high risk for deep venous thrombosis, Cancer ([address] COPD (chronic obstructive pulmonary disease) ([address] DJD (degenerative joint disease) of knee, GERD (gastroesophageal reflux disease), H/O cardiovascular stress test, Headache, Hiatal hernia, Mixed hyperlipidemia, and SVT (supraventricular tachycardia) ([address]    Past Surgical History:  has a past surgical history that includes back surgery; knee surgery (Left); Tonsillectomy; Tubal ligation;  Endometrial ablation; Knee arthroscopy (Right, 05/05/2017); Endometrial ablation; bone biopsy; Endoscopy, colon, diagnostic; Cholecystectomy, laparoscopic (N/A, 3/6/2020); and Upper gastrointestinal endoscopy (N/A, 3/6/2020).    Social History:  reports that she has been smoking cigarettes. She has a 30.00 pack-year smoking history. She has never used smokeless tobacco. She reports current alcohol use. She reports that she does not use drugs.     Family History: family history includes High Blood Pressure in her father and mother.      The patients home medications have been reviewed.     Allergies: Cortisone; Medrol [methylprednisolone];  Oxycontin [oxycodone hcl]; and Penicillins     -------------------------------------------------- RESULTS -------------------------------------------------  Labs:  Results for orders placed or performed during the hospital encounter of 08/05/20   Urinalysis, reflex to microscopic   Result Value Ref Range     Color, UA Yellow Straw/Yellow     Clarity, UA Clear Clear     Glucose, Ur Negative Negative mg/dL     Bilirubin Urine Negative Negative     Ketones, Urine Negative Negative mg/dL     Specific Gravity, UA 1.010 1.005 - 1.030     Blood, Urine TRACE (A) Negative     pH, UA 6.0 5.0 - 9.0     Protein, UA Negative Negative mg/dL     Urobilinogen, Urine 0.2 <2.0 E.U./dL     Nitrite, Urine Negative Negative     Leukocyte Esterase, Urine Negative Negative   CBC Auto Differential   Result Value Ref Range     WBC 10.3 4.5 - 11.5 E9/L     RBC 4.51 3.50 - 5.50 E12/L     Hemoglobin 14.4 11.5 - 15.5 g/dL     Hematocrit 42.5 34.0 - 48.0 %     MCV 94.2 80.0 - 99.9 fL     MCH 31.9 26.0 - 35.0 pg     MCHC 33.9 32.0 - 34.5 %     RDW 12.3 11.5 - 15.0 fL     Platelets 122 023 - 040 E9/L     MPV 11.0 7.0 - 12.0 fL     Neutrophils % 79.6 43.0 - 80.0 %     Immature Granulocytes % 0.4 0.0 - 5.0 %     Lymphocytes % 13.2 (L) 20.0 - 42.0 %     Monocytes % 6.0 2.0 - 12.0 %     Eosinophils % 0.6 0.0 - 6.0 %     Basophils % 0.2 0.0 - details for the plan of care and counseling regarding the diagnosis and prognosis. Their questions are answered at this time and they are agreeable with the plan. I discussed at length with them reasons for immediate return here for re evaluation. They will followup with their primary care provider for further evaluation and treatment in the next 2 days.        --------------------------------- ADDITIONAL PROVIDER NOTES ---------------------------------  At this time the patient is without objective evidence of an acute process requiring hospitalization or inpatient management. They have remained hemodynamically stable throughout their entire ED visit and are stable for discharge with outpatient follow-up.      The plan has been discussed in detail and they are aware of the specific conditions for emergent return, as well as the importance of follow-up.            New Prescriptions     CIPROFLOXACIN (CIPRO) 500 MG TABLET    Take 1 tablet by mouth 2 times daily for 7 days     HYDROCODONE-ACETAMINOPHEN (NORCO) 5-325 MG PER TABLET    Take 1 tablet by mouth every 4 hours as needed for Pain for up to 3 days. Intended supply: 3 days. Take lowest dose possible to manage pain     METRONIDAZOLE (FLAGYL) 500 MG TABLET    Take 1 tablet by mouth 3 times daily for 7 days     ONDANSETRON (ZOFRAN) 4 MG TABLET    Take 1 tablet by mouth daily as needed for Nausea or Vomiting        Diagnosis:  1. Diverticulitis of colon         Disposition:  Patient's disposition: Discharge to home  Patient's condition is stable.     Christopher Gray  PGY-2  10:48 PM EDT           Christopher Odell DO  Resident  08/05/20 2248               Cosigned by:  Jayme Packer DO at 8/6/2020  3:15 PM    Revision History                            Christopher Odell DO  Resident  08/08/20 [id number]

## 2020-09-26 ENCOUNTER — HOSPITAL ENCOUNTER (EMERGENCY)
Age: 55
Discharge: HOME | End: 2020-09-27
Attending: EMERGENCY MEDICINE
Payer: MEDICARE

## 2020-09-26 ENCOUNTER — APPOINTMENT (OUTPATIENT)
Dept: GENERAL RADIOLOGY | Age: 55
End: 2020-09-26
Payer: MEDICARE

## 2020-09-26 LAB
ALBUMIN SERPL-MCNC: 4.4 G/DL (ref 3.5–5.2)
ALP BLD-CCNC: 115 U/L (ref 35–104)
ALT SERPL-CCNC: 27 U/L (ref 0–32)
ANION GAP SERPL CALCULATED.3IONS-SCNC: 12 MMOL/L (ref 7–16)
AST SERPL-CCNC: 21 U/L (ref 0–31)
BASOPHILS ABSOLUTE: 0.03 E9/L (ref 0–0.2)
BASOPHILS RELATIVE PERCENT: 0.5 % (ref 0–2)
BILIRUB SERPL-MCNC: 0.3 MG/DL (ref 0–1.2)
BUN BLDV-MCNC: 12 MG/DL (ref 6–20)
CALCIUM SERPL-MCNC: 9.5 MG/DL (ref 8.6–10.2)
CHLORIDE BLD-SCNC: 105 MMOL/L (ref 98–107)
CO2: 25 MMOL/L (ref 22–29)
CREAT SERPL-MCNC: 0.7 MG/DL (ref 0.5–1)
EOSINOPHILS ABSOLUTE: 0.11 E9/L (ref 0.05–0.5)
EOSINOPHILS RELATIVE PERCENT: 1.9 % (ref 0–6)
GFR AFRICAN AMERICAN: >60
GFR NON-AFRICAN AMERICAN: >60 ML/MIN/1.73
GLUCOSE BLD-MCNC: 104 MG/DL (ref 74–99)
HCT VFR BLD CALC: 43.4 % (ref 34–48)
HEMOGLOBIN: 14.3 G/DL (ref 11.5–15.5)
IMMATURE GRANULOCYTES #: 0.02 E9/L
IMMATURE GRANULOCYTES %: 0.3 % (ref 0–5)
INR BLD: 0.9
LYMPHOCYTES ABSOLUTE: 2.34 E9/L (ref 1.5–4)
LYMPHOCYTES RELATIVE PERCENT: 40.6 % (ref 20–42)
MCH RBC QN AUTO: 31.6 PG (ref 26–35)
MCHC RBC AUTO-ENTMCNC: 32.9 % (ref 32–34.5)
MCV RBC AUTO: 95.8 FL (ref 80–99.9)
MONOCYTES ABSOLUTE: 0.47 E9/L (ref 0.1–0.95)
MONOCYTES RELATIVE PERCENT: 8.2 % (ref 2–12)
NEUTROPHILS ABSOLUTE: 2.79 E9/L (ref 1.8–7.3)
NEUTROPHILS RELATIVE PERCENT: 48.5 % (ref 43–80)
PDW BLD-RTO: 12.4 FL (ref 11.5–15)
PLATELET # BLD: 184 E9/L (ref 130–450)
PMV BLD AUTO: 11.3 FL (ref 7–12)
POTASSIUM SERPL-SCNC: 4.2 MMOL/L (ref 3.5–5)
PROTHROMBIN TIME: 9.9 SEC (ref 9.3–12.4)
RBC # BLD: 4.53 E12/L (ref 3.5–5.5)
SODIUM BLD-SCNC: 142 MMOL/L (ref 132–146)
TOTAL PROTEIN: 7.1 G/DL (ref 6.4–8.3)
WBC # BLD: 5.8 E9/L (ref 4.5–11.5)

## 2020-09-26 PROCEDURE — 71045 X-RAY EXAM CHEST 1 VIEW: CPT

## 2020-09-26 PROCEDURE — 99284 EMERGENCY DEPT VISIT MOD MDM: CPT

## 2020-09-26 PROCEDURE — 80053 COMPREHEN METABOLIC PANEL: CPT

## 2020-09-26 PROCEDURE — 93005 ELECTROCARDIOGRAM TRACING: CPT | Performed by: EMERGENCY MEDICINE

## 2020-09-26 PROCEDURE — 85025 COMPLETE CBC W/AUTO DIFF WBC: CPT

## 2020-09-26 PROCEDURE — 85610 PROTHROMBIN TIME: CPT

## 2020-09-26 PROCEDURE — 2580000003 HC RX 258: Performed by: EMERGENCY MEDICINE

## 2020-09-26 PROCEDURE — 81003 URINALYSIS AUTO W/O SCOPE: CPT

## 2020-09-26 PROCEDURE — 85378 FIBRIN DEGRADE SEMIQUANT: CPT

## 2020-09-26 PROCEDURE — 84484 ASSAY OF TROPONIN QUANT: CPT

## 2020-09-26 RX ORDER — SODIUM CHLORIDE 9 MG/ML
INJECTION, SOLUTION INTRAVENOUS CONTINUOUS
Status: DISCONTINUED | OUTPATIENT
Start: 2020-09-26 | End: 2020-09-27 | Stop reason: HOSPADM

## 2020-09-26 RX ADMIN — SODIUM CHLORIDE: 9 INJECTION, SOLUTION INTRAVENOUS at 23:50

## 2020-09-27 VITALS
RESPIRATION RATE: 14 BRPM | HEIGHT: 68 IN | SYSTOLIC BLOOD PRESSURE: 112 MMHG | OXYGEN SATURATION: 98 % | HEART RATE: 68 BPM | TEMPERATURE: 97.7 F | DIASTOLIC BLOOD PRESSURE: 87 MMHG | BODY MASS INDEX: 27.37 KG/M2

## 2020-09-27 LAB
BILIRUBIN URINE: NEGATIVE
BLOOD, URINE: NEGATIVE
CLARITY: CLEAR
COLOR: YELLOW
D DIMER: 229 NG/ML DDU
EKG ATRIAL RATE: 93 BPM
EKG P AXIS: 55 DEGREES
EKG P-R INTERVAL: 158 MS
EKG Q-T INTERVAL: 376 MS
EKG QRS DURATION: 82 MS
EKG QTC CALCULATION (BAZETT): 467 MS
EKG R AXIS: 3 DEGREES
EKG T AXIS: 27 DEGREES
EKG VENTRICULAR RATE: 93 BPM
GLUCOSE URINE: NEGATIVE MG/DL
KETONES, URINE: NEGATIVE MG/DL
LEUKOCYTE ESTERASE, URINE: NEGATIVE
NITRITE, URINE: NEGATIVE
PH UA: 7 (ref 5–9)
PROTEIN UA: NEGATIVE MG/DL
SPECIFIC GRAVITY UA: 1.01 (ref 1–1.03)
TROPONIN: <0.01 NG/ML (ref 0–0.03)
TROPONIN: <0.01 NG/ML (ref 0–0.03)
UROBILINOGEN, URINE: 0.2 E.U./DL

## 2020-09-27 PROCEDURE — 84484 ASSAY OF TROPONIN QUANT: CPT

## 2020-09-27 PROCEDURE — 93010 ELECTROCARDIOGRAM REPORT: CPT | Performed by: INTERNAL MEDICINE

## 2020-09-27 NOTE — ED NOTES
Pt reports feeling much better at this time.  No \"heart pounding\" or pain     Eulogio Enamorado RN  09/27/20 [id number]

## 2020-09-27 NOTE — ED NOTES
Bed: 11  Expected date:   Expected time:   Means of arrival:   Comments:  Marie Mccormack RN  09/26/20 [id number]

## 2020-09-27 NOTE — ED PROVIDER NOTES
The patients home medications have been reviewed. Allergies: Cortisone; Medrol [methylprednisolone]; Oxycontin [oxycodone hcl]; and Penicillins    ---------------------------------------------------PHYSICAL EXAM--------------------------------------    Constitutional/General: Alert and oriented x3, well appearing, non toxic in NAD  Head: Normocephalic and atraumatic  Eyes: PERRL, EOMI  Mouth: Oropharynx clear, handling secretions, no trismus  Neck: Supple, full ROM, non tender to palpation in the midline, no stridor, no crepitus, no meningeal signs  Pulmonary: Lungs clear to auscultation bilaterally, no wheezes, rales, or rhonchi. Not in respiratory distress  Cardiovascular:  Regular rate. Regular rhythm. No murmurs, gallops, or rubs. 2+ distal pulses  Chest: no chest wall tenderness  Abdomen: Soft. Non tender. Non distended. +BS. No rebound, guarding, or rigidity. No pulsatile masses appreciated. Musculoskeletal: Moves all extremities x 4. Warm and well perfused, no clubbing, cyanosis, or edema. Capillary refill <3 seconds  Skin: warm and dry. No rashes. Neurologic: GCS 15, CN 2-12 grossly intact, no focal deficits, symmetric strength 5/5 in the upper and lower extremities bilaterally  Psych: Normal Affect    -------------------------------------------------- RESULTS -------------------------------------------------  I have personally reviewed all laboratory and imaging results for this patient. Results are listed below.      LABS:  Results for orders placed or performed during the hospital encounter of 09/26/20   CBC auto differential   Result Value Ref Range    WBC 5.8 4.5 - 11.5 E9/L    RBC 4.53 3.50 - 5.50 E12/L    Hemoglobin 14.3 11.5 - 15.5 g/dL    Hematocrit 43.4 34.0 - 48.0 %    MCV 95.8 80.0 - 99.9 fL    MCH 31.6 26.0 - 35.0 pg    MCHC 32.9 32.0 - 34.5 %    RDW 12.4 11.5 - 15.0 fL    Platelets 350 804 - 134 E9/L    MPV 11.3 7.0 - 12.0 fL    Neutrophils % 48.5 43.0 - 80.0 %    Immature Granulocytes % 0.3 0.0 - 5.0 %    Lymphocytes % 40.6 20.0 - 42.0 %    Monocytes % 8.2 2.0 - 12.0 %    Eosinophils % 1.9 0.0 - 6.0 %    Basophils % 0.5 0.0 - 2.0 %    Neutrophils Absolute 2.79 1.80 - 7.30 E9/L    Immature Granulocytes # 0.02 E9/L    Lymphocytes Absolute 2.34 1.50 - 4.00 E9/L    Monocytes Absolute 0.47 0.10 - 0.95 E9/L    Eosinophils Absolute 0.11 0.05 - 0.50 E9/L    Basophils Absolute 0.03 0.00 - 0.20 E9/L   Comprehensive Metabolic Panel   Result Value Ref Range    Sodium 142 132 - 146 mmol/L    Potassium 4.2 3.5 - 5.0 mmol/L    Chloride 105 98 - 107 mmol/L    CO2 25 22 - 29 mmol/L    Anion Gap 12 7 - 16 mmol/L    Glucose 104 (H) 74 - 99 mg/dL    BUN 12 6 - 20 mg/dL    CREATININE 0.7 0.5 - 1.0 mg/dL    GFR Non-African American >60 >=60 mL/min/1.73    GFR African American >60     Calcium 9.5 8.6 - 10.2 mg/dL    Total Protein 7.1 6.4 - 8.3 g/dL    Alb 4.4 3.5 - 5.2 g/dL    Total Bilirubin 0.3 0.0 - 1.2 mg/dL    Alkaline Phosphatase 115 (H) 35 - 104 U/L    ALT 27 0 - 32 U/L    AST 21 0 - 31 U/L   Troponin   Result Value Ref Range    Troponin <0.01 0.00 - 0.03 ng/mL   Protime-INR   Result Value Ref Range    Protime 9.9 9.3 - 12.4 sec    INR 0.9    Urinalysis   Result Value Ref Range    Color, UA Yellow Straw/Yellow    Clarity, UA Clear Clear    Glucose, Ur Negative Negative mg/dL    Bilirubin Urine Negative Negative    Ketones, Urine Negative Negative mg/dL    Specific Gravity, UA 1.010 1.005 - 1.030    Blood, Urine Negative Negative    pH, UA 7.0 5.0 - 9.0    Protein, UA Negative Negative mg/dL    Urobilinogen, Urine 0.2 <2.0 E.U./dL    Nitrite, Urine Negative Negative    Leukocyte Esterase, Urine Negative Negative   D-Dimer, Quantitative   Result Value Ref Range    D-Dimer, Quant 229 ng/mL DDU   Troponin   Result Value Ref Range    Troponin <0.01 0.00 - 0.03 ng/mL       RADIOLOGY:  Interpreted by Radiologist.  XR CHEST PORTABLE   Final Result   No acute cardiopulmonary process. EKG:   This EKG is signed and interpreted by me. Rate: 93  Rhythm: Sinus  Interpretation: non-specific EKG  Comparison: stable as compared to patient's most recent EKG  Compared to may 2020        ------------------------- NURSING NOTES AND VITALS REVIEWED ---------------------------   The nursing notes within the ED encounter and vital signs as below have been reviewed by myself. /84   Pulse 74   Temp 98.4 °F (36.9 °C) (Oral)   Resp 16   Ht 5' 8\" (1.727 m)   SpO2 99%   BMI 27.37 kg/m²   Oxygen Saturation Interpretation: Normal    The patients available past medical records and past encounters were reviewed. ------------------------------ ED COURSE/MEDICAL DECISION MAKING----------------------  Medications   0.9 % sodium chloride infusion ( Intravenous Stopped 9/27/20 0149)             Medical Decision Making:        Re-Evaluations:             Patient arrived and on monitor. Patient is in sinus rhythm. Patient reporting no chest pain or difficulty breathing. Patient on monitor had no signs of arrhythmia here in the emergency department. Patient rechecked and feeling much better. Patient awake alert oriented heart and lung exam normal vital signs stable. Patient comfortable being discharged home she is to return if symptoms worsen or persist.  Patient reporting no pleuritic pain or shortness of breath on recheck and nontoxic-appearing      Consultations:                 Critical Care: This patient's ED course included: a personal history and physicial eaxmination    This patient has remained hemodynamically stable during their ED course. Counseling: The emergency provider has spoken with the patient and discussed todays results, in addition to providing specific details for the plan of care and counseling regarding the diagnosis and prognosis.   Questions are answered at this time and they are agreeable with the plan.       --------------------------------- IMPRESSION AND DISPOSITION

## 2020-09-30 ENCOUNTER — TELEPHONE (OUTPATIENT)
Dept: ADMINISTRATIVE | Age: 55
End: 2020-09-30

## 2020-09-30 NOTE — TELEPHONE ENCOUNTER
Can increase Toprol XL to 25 mg bid versus have 30 day monitor to evaluate SVT burden versus SVT ablation. Thanks.

## 2020-09-30 NOTE — TELEPHONE ENCOUNTER
I left a message for Brannon Baez to call back. I will send ED notes to [facility] for further advice and follow up timing.

## 2020-09-30 NOTE — TELEPHONE ENCOUNTER
Pt calling for ER f/u with Dr. Melida Bhardwaj from Sat dx: palpitations. Please return her call for an apt/recommendations. Thank you.

## 2020-10-01 ENCOUNTER — OFFICE VISIT (OUTPATIENT)
Dept: FAMILY MEDICINE CLINIC | Age: 55
End: 2020-10-01
Payer: MEDICARE

## 2020-10-01 VITALS
OXYGEN SATURATION: 97 % | DIASTOLIC BLOOD PRESSURE: 82 MMHG | WEIGHT: 196 LBS | BODY MASS INDEX: 29.7 KG/M2 | HEIGHT: 68 IN | SYSTOLIC BLOOD PRESSURE: 112 MMHG | HEART RATE: 88 BPM | RESPIRATION RATE: 16 BRPM | TEMPERATURE: 97.8 F

## 2020-10-01 PROCEDURE — 99213 OFFICE O/P EST LOW 20 MIN: CPT | Performed by: FAMILY MEDICINE

## 2020-10-01 PROCEDURE — G8484 FLU IMMUNIZE NO ADMIN: HCPCS | Performed by: FAMILY MEDICINE

## 2020-10-01 PROCEDURE — 3017F COLORECTAL CA SCREEN DOC REV: CPT | Performed by: FAMILY MEDICINE

## 2020-10-01 PROCEDURE — G8417 CALC BMI ABV UP PARAM F/U: HCPCS | Performed by: FAMILY MEDICINE

## 2020-10-01 PROCEDURE — 4004F PT TOBACCO SCREEN RCVD TLK: CPT | Performed by: FAMILY MEDICINE

## 2020-10-01 PROCEDURE — G8427 DOCREV CUR MEDS BY ELIG CLIN: HCPCS | Performed by: FAMILY MEDICINE

## 2020-10-01 ASSESSMENT — ENCOUNTER SYMPTOMS
CONSTIPATION: 0
SHORTNESS OF BREATH: 0
BLOOD IN STOOL: 0
VOMITING: 0
ABDOMINAL PAIN: 0
WHEEZING: 0
DIARRHEA: 0
COUGH: 0
NAUSEA: 0

## 2020-10-01 NOTE — TELEPHONE ENCOUNTER
Patient is asking to speak to the nurse regarding SVT ablation. Missy Ramos could you please discuss with here? Also [facility] gave medication instructions for metoprolol. I am holding 10/26/20 for a potential ablation date.

## 2020-10-01 NOTE — TELEPHONE ENCOUNTER
I spoke to the patient at length regarding Dr. Lopes Carmen recommendations of increasing Toprol 25mg to BID vs wearing a 30 day MCOT to determine burden vs SVT ablation. The patient reports that she wishes to think about these options over night and will call this office on 10/2/2020 with her decision.

## 2020-10-01 NOTE — PROGRESS NOTES
HPI:  Patient comes in today for   Chief Complaint   Patient presents with    Shortness of Breath     pt. c/o sob for 3 days. pt. was in ED saruday for irregular heart beat. Review of Systems  Review of Systems   Constitutional: Negative for chills, diaphoresis and fever. HENT: Negative for ear discharge, ear pain, hearing loss, nosebleeds and tinnitus. Respiratory: Negative for cough (none), shortness of breath and wheezing. Cardiovascular: Positive for palpitations (has had SVT 4 days ago with a thumping sensation. ). Negative for chest pain. Gastrointestinal: Negative for abdominal pain, blood in stool, constipation, diarrhea, nausea and vomiting. Genitourinary: Negative for difficulty urinating, dysuria, flank pain and hematuria. Musculoskeletal: Negative for myalgias. Skin: Negative for rash. Neurological: Negative for dizziness and headaches. Hematological: Does not bruise/bleed easily. Psychiatric/Behavioral: Positive for agitation. Negative for hallucinations and suicidal ideas. The patient is nervous/anxious. PE[de-identified]  /82   Pulse 88   Temp 97.8 °F (36.6 °C) (Temporal)   Resp 16   Ht 5' 8\" (1.727 m)   Wt 196 lb (88.9 kg)   SpO2 97%   BMI 29.80 kg/m²       Physical Exam  Constitutional:       Appearance: Normal appearance. She is well-developed. HENT:      Head: Normocephalic and atraumatic. Eyes:      General: No scleral icterus. Conjunctiva/sclera: Conjunctivae normal.      Pupils: Pupils are equal, round, and reactive to light. Neck:      Musculoskeletal: Neck supple. Thyroid: No thyromegaly. Vascular: No JVD. Trachea: No tracheal deviation. Cardiovascular:      Rate and Rhythm: Normal rate and regular rhythm. Heart sounds: Normal heart sounds. No murmur. No gallop. Pulmonary:      Effort: Pulmonary effort is normal. No respiratory distress. Breath sounds: Normal breath sounds. No wheezing.    Abdominal: General: Bowel sounds are normal.      Palpations: Abdomen is soft. Musculoskeletal:         General: No tenderness. Skin:     General: Skin is warm. Capillary Refill: Capillary refill takes less than 2 seconds. Findings: No erythema or rash. Neurological:      General: No focal deficit present. Mental Status: She is alert and oriented to person, place, and time. Deep Tendon Reflexes: Reflexes normal.      Comments:        Psychiatric:         Mood and Affect: Mood normal.       We discussed and she will agree to have the Ablation done on 10/6/2020. ASSESSMENT/PLAN:    1. Paroxysmal SVT (supraventricular tachycardia) ([facility] [address]                    FARHAD Stauffer.

## 2020-10-02 ENCOUNTER — HOSPITAL ENCOUNTER (OUTPATIENT)
Age: 55
Discharge: HOME | End: 2020-10-02
Payer: MEDICARE

## 2020-10-02 PROCEDURE — U0003 INFECTIOUS AGENT DETECTION BY NUCLEIC ACID (DNA OR RNA); SEVERE ACUTE RESPIRATORY SYNDROME CORONAVIRUS 2 (SARS-COV-2) (CORONAVIRUS DISEASE [COVID-19]), AMPLIFIED PROBE TECHNIQUE, MAKING USE OF HIGH THROUGHPUT TECHNOLOGIES AS DESCRIBED BY CMS-2020-01-R: HCPCS

## 2020-10-02 NOTE — TELEPHONE ENCOUNTER
Shellie Mckay called and states she would like to move forward with the SVT ablation w/ [facility] on 10/6/20. She will obtain COVID test today at [address] [place] [address]. She will hold metoprolol for 48 hours prior to the procedure. All procedural instructions and she verbalized understanding.

## 2020-10-02 NOTE — PATIENT INSTRUCTIONS
COVID-19 PCR test completed. Patient handout For Patients Who Have Been Tested for Covid-19 (Coronavirus) was given to the patient, which includes test result notification process.     Patient Education        Learning About Benefits From Quitting Smoking  How does quitting smoking make you healthier? If you're thinking about quitting smoking, you may have a few reasons to be smoke-free. Your health may be one of them. · When you quit smoking, you lower your risks for cancer, lung disease, heart attack, stroke, blood vessel disease, and blindness from macular degeneration. · When you're smoke-free, you get sick less often, and you heal faster. You are less likely to get colds, flu, bronchitis, and pneumonia. · As a nonsmoker, you may find that your mood is better and you are less stressed. When and how will you feel healthier? Quitting has real health benefits that start from day 1 of being smoke-free. And the longer you stay smoke-free, the healthier you get and the better you feel. The first hours  · After just 20 minutes, your blood pressure and heart rate go down. That means there's less stress on your heart and blood vessels. · Within 12 hours, the level of carbon monoxide in your blood drops back to normal. That makes room for more oxygen. With more oxygen in your body, you may notice that you have more energy than when you smoked. After 2 weeks  · Your lungs start to work better. · Your risk of heart attack starts to drop. After 1 month  · When your lungs are clear, you cough less and breathe deeper, so it's easier to be active. · Your sense of taste and smell return. That means you can enjoy food more than you have since you started smoking. Over the years  · After 1 year, your risk of heart disease is half what it would be if you kept smoking. · After 5 years, your risk of stroke starts to shrink. Within a few years after that, it's about the same as if you'd never smoked. · After 10 years, your risk of dying from lung cancer is cut by about half. And your risk for many other types of cancer is lower too. How would quitting help others in your life?   When you quit

## 2020-10-04 LAB
SARS-COV-2: NOT DETECTED
SOURCE: NORMAL

## 2020-10-05 ENCOUNTER — ANESTHESIA EVENT (OUTPATIENT)
Dept: CARDIAC CATH/INVASIVE PROCEDURES | Age: 55
End: 2020-10-05

## 2020-10-05 ENCOUNTER — TELEPHONE (OUTPATIENT)
Dept: CARDIAC CATH/INVASIVE PROCEDURES | Age: 55
End: 2020-10-05

## 2020-10-05 NOTE — TELEPHONE ENCOUNTER
Reminded patient of scheduled procedure on 10/6/20. Instructions given and COVID questionnaire completed.

## 2020-10-06 ENCOUNTER — ANESTHESIA (OUTPATIENT)
Dept: CARDIAC CATH/INVASIVE PROCEDURES | Age: 55
End: 2020-10-06

## 2020-10-06 ENCOUNTER — HOSPITAL ENCOUNTER (OUTPATIENT)
Dept: CARDIAC CATH/INVASIVE PROCEDURES | Age: 55
Discharge: HOME | End: 2020-10-06
Payer: MEDICARE

## 2020-10-06 VITALS
HEART RATE: 81 BPM | HEIGHT: 68 IN | SYSTOLIC BLOOD PRESSURE: 107 MMHG | DIASTOLIC BLOOD PRESSURE: 68 MMHG | OXYGEN SATURATION: 97 % | BODY MASS INDEX: 28.79 KG/M2 | WEIGHT: 190 LBS | TEMPERATURE: 97.3 F | RESPIRATION RATE: 18 BRPM

## 2020-10-06 VITALS — OXYGEN SATURATION: 100 % | SYSTOLIC BLOOD PRESSURE: 115 MMHG | DIASTOLIC BLOOD PRESSURE: 64 MMHG

## 2020-10-06 LAB
ANION GAP SERPL CALCULATED.3IONS-SCNC: 8 MMOL/L (ref 7–16)
BASOPHILS ABSOLUTE: 0.03 E9/L (ref 0–0.2)
BASOPHILS RELATIVE PERCENT: 0.6 % (ref 0–2)
BUN BLDV-MCNC: 12 MG/DL (ref 6–20)
CALCIUM SERPL-MCNC: 9.6 MG/DL (ref 8.6–10.2)
CHLORIDE BLD-SCNC: 106 MMOL/L (ref 98–107)
CO2: 27 MMOL/L (ref 22–29)
CREAT SERPL-MCNC: 0.7 MG/DL (ref 0.5–1)
EKG ATRIAL RATE: 72 BPM
EKG P AXIS: 60 DEGREES
EKG P-R INTERVAL: 176 MS
EKG Q-T INTERVAL: 410 MS
EKG QRS DURATION: 88 MS
EKG QTC CALCULATION (BAZETT): 448 MS
EKG R AXIS: 11 DEGREES
EKG T AXIS: 19 DEGREES
EKG VENTRICULAR RATE: 72 BPM
EOSINOPHILS ABSOLUTE: 0.07 E9/L (ref 0.05–0.5)
EOSINOPHILS RELATIVE PERCENT: 1.4 % (ref 0–6)
GFR AFRICAN AMERICAN: >60
GFR NON-AFRICAN AMERICAN: >60 ML/MIN/1.73
GLUCOSE BLD-MCNC: 99 MG/DL (ref 74–99)
HCG QUALITATIVE: NEGATIVE
HCT VFR BLD CALC: 42.5 % (ref 34–48)
HEMOGLOBIN: 14 G/DL (ref 11.5–15.5)
IMMATURE GRANULOCYTES #: 0.01 E9/L
IMMATURE GRANULOCYTES %: 0.2 % (ref 0–5)
LYMPHOCYTES ABSOLUTE: 2.03 E9/L (ref 1.5–4)
LYMPHOCYTES RELATIVE PERCENT: 39.2 % (ref 20–42)
MCH RBC QN AUTO: 31.2 PG (ref 26–35)
MCHC RBC AUTO-ENTMCNC: 32.9 % (ref 32–34.5)
MCV RBC AUTO: 94.7 FL (ref 80–99.9)
MONOCYTES ABSOLUTE: 0.37 E9/L (ref 0.1–0.95)
MONOCYTES RELATIVE PERCENT: 7.1 % (ref 2–12)
NEUTROPHILS ABSOLUTE: 2.67 E9/L (ref 1.8–7.3)
NEUTROPHILS RELATIVE PERCENT: 51.5 % (ref 43–80)
PDW BLD-RTO: 12.2 FL (ref 11.5–15)
PLATELET # BLD: 174 E9/L (ref 130–450)
PMV BLD AUTO: 10.9 FL (ref 7–12)
POTASSIUM SERPL-SCNC: 4.4 MMOL/L (ref 3.5–5)
RBC # BLD: 4.49 E12/L (ref 3.5–5.5)
SODIUM BLD-SCNC: 141 MMOL/L (ref 132–146)
WBC # BLD: 5.2 E9/L (ref 4.5–11.5)

## 2020-10-06 PROCEDURE — 93621 COMP EP EVL L PAC&REC C SINS: CPT | Performed by: INTERNAL MEDICINE

## 2020-10-06 PROCEDURE — 2580000003 HC RX 258: Performed by: NURSE ANESTHETIST, CERTIFIED REGISTERED

## 2020-10-06 PROCEDURE — C1730 CATH, EP, 19 OR FEW ELECT: HCPCS

## 2020-10-06 PROCEDURE — C1894 INTRO/SHEATH, NON-LASER: HCPCS

## 2020-10-06 PROCEDURE — 3700000000 HC ANESTHESIA ATTENDED CARE

## 2020-10-06 PROCEDURE — 3700000001 HC ADD 15 MINUTES (ANESTHESIA)

## 2020-10-06 PROCEDURE — 6360000002 HC RX W HCPCS: Performed by: NURSE ANESTHETIST, CERTIFIED REGISTERED

## 2020-10-06 PROCEDURE — 93010 ELECTROCARDIOGRAM REPORT: CPT | Performed by: INTERNAL MEDICINE

## 2020-10-06 PROCEDURE — 93653 COMPRE EP EVAL TX SVT: CPT | Performed by: INTERNAL MEDICINE

## 2020-10-06 PROCEDURE — 93005 ELECTROCARDIOGRAM TRACING: CPT | Performed by: INTERNAL MEDICINE

## 2020-10-06 PROCEDURE — 6360000002 HC RX W HCPCS

## 2020-10-06 PROCEDURE — 93623 PRGRMD STIMJ&PACG IV RX NFS: CPT | Performed by: INTERNAL MEDICINE

## 2020-10-06 PROCEDURE — 80048 BASIC METABOLIC PNL TOTAL CA: CPT

## 2020-10-06 PROCEDURE — 93613 INTRACARDIAC EPHYS 3D MAPG: CPT | Performed by: INTERNAL MEDICINE

## 2020-10-06 PROCEDURE — 36415 COLL VENOUS BLD VENIPUNCTURE: CPT

## 2020-10-06 PROCEDURE — 2500000003 HC RX 250 WO HCPCS

## 2020-10-06 PROCEDURE — C1732 CATH, EP, DIAG/ABL, 3D/VECT: HCPCS

## 2020-10-06 PROCEDURE — 2709999900 HC NON-CHARGEABLE SUPPLY

## 2020-10-06 PROCEDURE — 84703 CHORIONIC GONADOTROPIN ASSAY: CPT

## 2020-10-06 PROCEDURE — 85025 COMPLETE CBC W/AUTO DIFF WBC: CPT

## 2020-10-06 RX ORDER — SODIUM CHLORIDE 9 MG/ML
INJECTION, SOLUTION INTRAVENOUS CONTINUOUS PRN
Status: DISCONTINUED | OUTPATIENT
Start: 2020-10-06 | End: 2020-10-06 | Stop reason: SDUPTHER

## 2020-10-06 RX ORDER — MIDAZOLAM HYDROCHLORIDE 1 MG/ML
INJECTION INTRAMUSCULAR; INTRAVENOUS PRN
Status: DISCONTINUED | OUTPATIENT
Start: 2020-10-06 | End: 2020-10-06 | Stop reason: SDUPTHER

## 2020-10-06 RX ORDER — FENTANYL CITRATE 50 UG/ML
INJECTION, SOLUTION INTRAMUSCULAR; INTRAVENOUS PRN
Status: DISCONTINUED | OUTPATIENT
Start: 2020-10-06 | End: 2020-10-06 | Stop reason: SDUPTHER

## 2020-10-06 RX ORDER — SODIUM CHLORIDE 0.9 % (FLUSH) 0.9 %
10 SYRINGE (ML) INJECTION EVERY 12 HOURS SCHEDULED
Status: DISCONTINUED | OUTPATIENT
Start: 2020-10-06 | End: 2020-10-07 | Stop reason: HOSPADM

## 2020-10-06 RX ORDER — PHENYLEPHRINE HYDROCHLORIDE 10 MG/ML
INJECTION INTRAVENOUS PRN
Status: DISCONTINUED | OUTPATIENT
Start: 2020-10-06 | End: 2020-10-06 | Stop reason: SDUPTHER

## 2020-10-06 RX ORDER — PROPOFOL 10 MG/ML
INJECTION, EMULSION INTRAVENOUS PRN
Status: DISCONTINUED | OUTPATIENT
Start: 2020-10-06 | End: 2020-10-06 | Stop reason: SDUPTHER

## 2020-10-06 RX ORDER — ACETAMINOPHEN 325 MG/1
650 TABLET ORAL EVERY 4 HOURS PRN
Status: DISCONTINUED | OUTPATIENT
Start: 2020-10-06 | End: 2020-10-07 | Stop reason: HOSPADM

## 2020-10-06 RX ORDER — PROPOFOL 10 MG/ML
INJECTION, EMULSION INTRAVENOUS CONTINUOUS PRN
Status: DISCONTINUED | OUTPATIENT
Start: 2020-10-06 | End: 2020-10-06 | Stop reason: SDUPTHER

## 2020-10-06 RX ORDER — SODIUM CHLORIDE 0.9 % (FLUSH) 0.9 %
10 SYRINGE (ML) INJECTION PRN
Status: DISCONTINUED | OUTPATIENT
Start: 2020-10-06 | End: 2020-10-07 | Stop reason: HOSPADM

## 2020-10-06 RX ADMIN — PROPOFOL 50 MG: 10 INJECTION, EMULSION INTRAVENOUS at 08:43

## 2020-10-06 RX ADMIN — MIDAZOLAM 2 MG: 1 INJECTION INTRAMUSCULAR; INTRAVENOUS at 07:43

## 2020-10-06 RX ADMIN — MIDAZOLAM 1 MG: 1 INJECTION INTRAMUSCULAR; INTRAVENOUS at 08:40

## 2020-10-06 RX ADMIN — MIDAZOLAM 1 MG: 1 INJECTION INTRAMUSCULAR; INTRAVENOUS at 07:55

## 2020-10-06 RX ADMIN — SODIUM CHLORIDE: 9 INJECTION, SOLUTION INTRAVENOUS at 07:30

## 2020-10-06 RX ADMIN — FENTANYL CITRATE 50 MCG: 50 INJECTION, SOLUTION INTRAMUSCULAR; INTRAVENOUS at 08:40

## 2020-10-06 RX ADMIN — PROPOFOL 50 MG: 10 INJECTION, EMULSION INTRAVENOUS at 08:58

## 2020-10-06 RX ADMIN — PROPOFOL 80 MG: 10 INJECTION, EMULSION INTRAVENOUS at 08:00

## 2020-10-06 RX ADMIN — PHENYLEPHRINE HYDROCHLORIDE 50 MCG: 10 INJECTION INTRAVENOUS at 09:30

## 2020-10-06 RX ADMIN — PROPOFOL 50 MG: 10 INJECTION, EMULSION INTRAVENOUS at 08:11

## 2020-10-06 RX ADMIN — PROPOFOL 50 MG: 10 INJECTION, EMULSION INTRAVENOUS at 09:05

## 2020-10-06 RX ADMIN — PHENYLEPHRINE HYDROCHLORIDE 100 MCG: 10 INJECTION INTRAVENOUS at 08:20

## 2020-10-06 RX ADMIN — SODIUM CHLORIDE: 9 INJECTION, SOLUTION INTRAVENOUS at 08:20

## 2020-10-06 RX ADMIN — MIDAZOLAM 1 MG: 1 INJECTION INTRAMUSCULAR; INTRAVENOUS at 08:57

## 2020-10-06 RX ADMIN — PROPOFOL 80 MCG/KG/MIN: 10 INJECTION, EMULSION INTRAVENOUS at 08:00

## 2020-10-06 RX ADMIN — MIDAZOLAM 1 MG: 1 INJECTION INTRAMUSCULAR; INTRAVENOUS at 08:00

## 2020-10-06 RX ADMIN — FENTANYL CITRATE 50 MCG: 50 INJECTION, SOLUTION INTRAMUSCULAR; INTRAVENOUS at 08:15

## 2020-10-06 RX ADMIN — PROPOFOL 50 MG: 10 INJECTION, EMULSION INTRAVENOUS at 08:21

## 2020-10-06 RX ADMIN — FENTANYL CITRATE 50 MCG: 50 INJECTION, SOLUTION INTRAMUSCULAR; INTRAVENOUS at 08:57

## 2020-10-06 RX ADMIN — PHENYLEPHRINE HYDROCHLORIDE 150 MCG: 10 INJECTION INTRAVENOUS at 09:47

## 2020-10-06 RX ADMIN — FENTANYL CITRATE 50 MCG: 50 INJECTION, SOLUTION INTRAMUSCULAR; INTRAVENOUS at 07:50

## 2020-10-06 ASSESSMENT — PULMONARY FUNCTION TESTS
PIF_VALUE: 0

## 2020-10-06 ASSESSMENT — ENCOUNTER SYMPTOMS: SHORTNESS OF BREATH: 1

## 2020-10-06 ASSESSMENT — LIFESTYLE VARIABLES: SMOKING_STATUS: 1

## 2020-10-06 NOTE — PROGRESS NOTES
Patient up and out of bed. Walked to the bathroom to void. No difficulty noted. Right and left groins remain soft without drainage or hematoma. Dressings intact. Discharge instructions reviewed with patient and brother. Both verbalized understanding. Patient discharged to home in care of brother with mild soreness in right groin. Patient states no pain medication needed at this time.

## 2020-10-06 NOTE — H&P
[address] and Vascular [facility] Electrophysiology  Consultation Report  PATIENT: Migue Munroe  MEDICAL RECORD NUMBER: [id number]  DATE OF SERVICE:  10/6/2020  ATTENDING ELECTROPHYSIOLOGIST: Levi Jaimes MD  REFERRING PHYSICIAN: Flower Dumont DO  CHIEF COMPLAINT: Supraventricular tachycardia    HPI: This is a 54 y.o. female with a history of   Patient Active Problem List   Diagnosis    DJD (degenerative joint disease) of knee    Medial meniscus tear    Elbow arthritis    DDD (degenerative disc disease), lumbar    Elbow pain    Lateral epicondylitis    Paroxysmal SVT (supraventricular tachycardia) (HCC)    Palpitations    Primary osteoarthritis of left knee    Mixed hyperlipidemia    Vitamin D deficiency    Bronchitis, acute, with bronchospasm    Exertional dyspnea    Primary osteoarthritis of right knee    Acute medial meniscal tear    Complex tear of lateral meniscus of right knee as current injury    Synovitis    Chronic obstructive pulmonary disease ([facility] [address]    Atypical chest pain    Symptomatic cholelithiasis    Gastroesophageal reflux disease without esophagitis    Chest pain   who presents to cardiac electrophysiology clinic for consultation of SVT. The patient has had history of SVT diagnosed [id number]. She reports having around 1-2 episodes a year but was only given Adenosine twice in her lifetime. She presented to the hospital in May 2020 due to palpitations and chest pain and was noted to be in NCT with short RP. She was given IV Adenosine 6 and then 12 mg which converted the rhythm back to NSR. Prior to her last ER visit she was not taking the Toprol regularly due to hypotension, but currently taking Toprol XL daily since May 2020. Today she presents in NSR. The patient denies any chest pain, dyspnea, palpitations, dizziness, syncope, orthopnea or paroxysmal nocturnal dyspnea.  She does report that her grandfather passed away suddenly from a MI. We discussed about rhythm control options including medication for suppression versus catheter ablation, explaining the risks and benefits as outlined below. She opts to continue the same medication regiment.      Patient Active Problem List    Diagnosis Date Noted    Chest pain 05/12/2020    Symptomatic cholelithiasis     Gastroesophageal reflux disease without esophagitis     Chronic obstructive pulmonary disease ([facility] [address] 11/12/2018    Atypical chest pain 11/12/2018    Complex tear of lateral meniscus of right knee as current injury 05/05/2017    Synovitis 05/05/2017    Primary osteoarthritis of right knee 03/13/2017    Acute medial meniscal tear 03/13/2017    Exertional dyspnea 12/06/2016    Bronchitis, acute, with bronchospasm 11/07/2016    Mixed hyperlipidemia 10/25/2016    Vitamin D deficiency 10/25/2016    Primary osteoarthritis of left knee 05/24/2016    Paroxysmal SVT (supraventricular tachycardia) (HCC)     Palpitations     Elbow pain 02/26/2015    Lateral epicondylitis 02/26/2015    DDD (degenerative disc disease), lumbar 11/03/2014    Elbow arthritis 08/26/2014    DJD (degenerative joint disease) of knee 07/18/2014    Medial meniscus tear 07/18/2014       Past Medical History:   Diagnosis Date    At high risk for deep venous thrombosis     Cancer ([facility] [address]     cervical    COPD (chronic obstructive pulmonary disease) (HCC)     early stage    DJD (degenerative joint disease) of knee 7/18/2014    GERD (gastroesophageal reflux disease)     H/O cardiovascular stress test 05/13/2020    Lexiscan    Headache     Hiatal hernia     Mixed hyperlipidemia 10/25/2016    SVT (supraventricular tachycardia) (HCC)     had episode of SVT 6 months ago and several small episode over last couple months       Family History   Problem Relation Age of Onset    High Blood Pressure Mother     High Blood Pressure Father        Social History     Tobacco Use    Smoking status: Current Every Day Smoker     Packs/day: 1.00     Years: 30.00     Pack years: 30.00     Types: Cigarettes    Smokeless tobacco: Never Used   Substance Use Topics    Alcohol use: Yes     Comment: rarely       Current Outpatient Medications   Medication Sig Dispense Refill    pantoprazole (PROTONIX) 40 MG tablet Take 1 tablet by mouth every morning (before breakfast) 30 tablet 3    atorvastatin (LIPITOR) 20 MG tablet Take 1 tablet by mouth daily 30 tablet 3    Cholecalciferol (VITAMIN D3) 50 MCG (2000 UT) CAPS Take 1 capsule by mouth daily 30 capsule 3    metoprolol succinate (TOPROL XL) 25 MG extended release tablet Take 1 tablet by mouth daily 90 tablet 1    ibuprofen (ADVIL;MOTRIN) 800 MG tablet Take 1 tablet by mouth every 6 hours as needed for Pain 20 tablet 0    nicotine (NICODERM CQ) 21 MG/24HR Place 1 patch onto the skin daily (Patient not taking: Reported on 7/29/2020) 42 patch 0     No current facility-administered medications for this encounter. Allergies   Allergen Reactions    Cortisone Other (See Comments)     Redness and breaking into a sweat.  Medrol [Methylprednisolone]      Fever; redness; sweat    Oxycontin [Oxycodone Hcl] Itching    Penicillins      Cant remember reaction was from childhood       ROS:   Constitutional: Negative for fever, activity change and appetite change. HENT: Negative for epistaxis. Eyes: Negative for diploplia, blurred vision. Respiratory: Negative for cough, chest tightness, shortness of breath and wheezing. Cardiovascular: pertinent positives in HPI  Gastrointestinal: Negative for abdominal pain and blood in stool.    All other review of systems are negative     PHYSICAL EXAM:   Vitals:    10/06/20 0629   BP: 114/88   Resp: 20   Temp: 97.3 °F (36.3 °C)   SpO2: 97%   Weight: 190 lb (86.2 kg)   Height: 5' 8\" (1.727 m)      Constitutional: Well-developed, no acute distress  Eyes: conjunctivae normal, no xanthelasma   Ears, Nose, Throat: oral mucosa moist, no cyanosis   CV: no JVD. Regular rate and rhythm. Normal S1S2 and no S3. No murmurs, rubs, or gallops. PMI is nondisplaced  Lungs: clear to auscultation bilaterally, normal respiratory effort without used of accessory muscles  Abdomen: soft, non-tender, bowel sounds present, no masses or hepatomegaly   Musculoskeletal: no digital clubbing, no edema   Skin: warm, no rashes     I have personally reviewed the laboratory, cardiac diagnostic and radiographic testing as outlined below:    Data:    No results for input(s): WBC, HGB, HCT, PLT in the last 72 hours. No results for input(s): NA, K, CL, CO2, BUN, CREATININE, CALCIUM in the last 72 hours. Invalid input(s): GLU, MAGNESIUM   Lab Results   Component Value Date    MG 2.2 2020     No results for input(s): TSH in the last 72 hours. No results for input(s): INR in the last 72 hours. CXR 20:   FINDINGS:    Slight elevation of the right hemidiaphragm.  Clear lungs.  No definite    findings of pneumothorax or pleural effusion.  Normal mediastinal, hilar, and    cardiac contours.  No acute fracture.              Impression    No acute cardiopulmonary process.           EK20: SR, rate: 89 bpm, PRWP, low voltage, QTc 416 msec. No delta wave. - Please see scan in Cardiology. Echocardiogram 20:   Findings      Left Ventricle   Ejection fraction is visually estimated at 55%. No regional wall motion   abnormalities seen. Mild left ventricular concentric hypertrophy noted. Left ventricle size is normal. Normal left ventricular diastolic filling   pattern for age. Right Ventricle   Normal right ventricle structure and function. Left Atrium   Normal left atrium. Right Atrium   Normal right atrium. Mitral Valve   Structurally normal mitral valve. No evidence of mitral valve stenosis. Physiologic and/or trace mitral regurgitation is present.       Tricuspid Valve   The tricuspid valve appears structurally normal.   Physiologic and/or trace tricuspid regurgitation. Aortic Valve   The aortic valve is trileaflet. No hemodynamically significant aortic   stenosis is present. No evidence of aortic valve regurgitation. Pulmonic Valve   Pulmonic valve is structurally normal. Physiologic and/or trace pulmonic   regurgitation present. Pericardial Effusion   No evidence for hemodynamically significant pericardial effusion. Aorta   Normal aortic root and ascending aorta. Miscellaneous   The inferior vena cava diameter is normal with normal respiratory   variation. Conclusions      Summary   Ejection fraction is visually estimated at 55%. No regional wall motion abnormalities seen. Mild left ventricular concentric hypertrophy noted. Normal right ventricle structure and function. Physiologic and/or trace mitral regurgitation is present.       Signature      ----------------------------------------------------------------   Electronically signed by José Luis ORDOÑEZInterpreting   physician) on 05/13/2020 11:03 AM   ----------------------------------------------------------------     M-Mode/2D Measurements & Calculations      LV Diastolic     LV Systolic Dimension:    AV Cusp Separation: 2.2 cmLA   Dimension: 4.8   3.5 cm                    Dimension: 3.2 cmAO Root   cm               LV Volume Diastolic:      Dimension: 3.3 cm   LV FS:27.1 %     107.2 ml   LV PW Diastolic: LV Volume Systolic: 44.8   1.1 cm           ml   Septum           LV EDV/LV EDV Index:      RV Diastolic Dimension: 2.2 cm   Diastolic: 1.2   055.4 mlLV ESV/LV ESV   cm               Index: 49.4 ml   CO: 4.79 l/min   EF Calculated: 53.9 %     LA volume/Index: 28.7 ml   LV Mass: 206.37   g                       LVOT: 2 cm     Doppler Measurements & Calculations      MV Peak E-Wave: 0.71 AV Peak Velocity:     LVOT Peak Velocity: 1.03 m/s   m/s                  1.25 m/s              LVOT Mean Velocity: 0.75 m/s   MV Peak A-Wave: 0.61 AV Peak Gradient: LVOT Peak Gradient: 4.3   m/s                  6.24 mmHg             mmHgLVOT Mean Gradient: 2.5   MV E/A Ratio: 1.18   AV Mean Velocity:     mmHg   MV Peak Gradient:    0.93 m/s   3.5 mmHg             AV Mean Gradient: 3.8   MV Mean Gradient:    mmHg   1.6 mmHg             AV VTI: 28 cm         TR Velocity:1.92 m/s   MV Mean Velocity:    AV Area               TR Gradient:14.78 mmHg   0.59 m/s             (Continuity):2.44     PV Peak Velocity: 1.04 m/s   MV Deceleration      cm^2                  PV Peak Gradient: 4.34 mmHg   Time: 203.3 msec                           PV Mean Velocity: 0.75 m/s   MV P1/2t: 71.6 msec  LVOT VTI: 21.8 cm     PV Mean Gradient: 2.5 mmHg   MVA by PHT:3.07 cm^2 IVRT: 96.9 msec   MV Area   (continuity): 3 cm^2    Stress Test 5/13/20:  FINDINGS:    Perfusion:         Image quality is good with no significant attenuation artifact.         There are no fixed or reversible perfusion defects.         Summed stress score:  0         Summed rest score:  0         Summed reversibility score:  0         Scores are visually adjusted for potential artifact.         TID score:  1.28 (threshold value of 1.39 is used for Lexiscan stress with    Tc-99m)         Function:         End diastolic volume:  61CW         Left ventricular ejection fraction:  Greater than 70%         Wall motion abnormalities:  None.              Impression    Perfusion:  Normal exam         Function:  Normal exam      Outpatient Monitor 11/20/15: I have independently reviewed all of the ECGs and rhythm strips per above     Assessment/Plan:  This is a 54 y.o. female with a history of   Patient Active Problem List   Diagnosis    DJD (degenerative joint disease) of knee    Medial meniscus tear    Elbow arthritis    DDD (degenerative disc disease), lumbar    Elbow pain    Lateral epicondylitis    Paroxysmal SVT (supraventricular tachycardia) (HCC)    Palpitations    Primary osteoarthritis of left knee    Mixed hyperlipidemia    Vitamin D deficiency    Bronchitis, acute, with bronchospasm    Exertional dyspnea    Primary osteoarthritis of right knee    Acute medial meniscal tear    Complex tear of lateral meniscus of right knee as current injury    Synovitis    Chronic obstructive pulmonary disease (HCC)    Atypical chest pain    Symptomatic cholelithiasis    Gastroesophageal reflux disease without esophagitis    Chest pain    who presents with PSVT. 1. Supraventricular tachycardia  - Narrow complex with short RP.  - Terminated with IV Adenosine.  - Reviewed vagal maneuvers. - On Toprol XL and no recurrent SVT since. - I explained the pathophysiology of this condition, as well as the possibilities of AVNRT(60%),  AVRT(30%) or atrial tachycardia (10%) as the etiology of the patient's arrhythmias. I also gave the patient the options of continued medical therapy versus EP study and catheter ablation, with the latter being the more likely approach to achieve a long-term cure without the need for medications. The risks, benefits, and alternatives to EP study and catheter ablation were reviewed in detail today, including bleeding, blood clot, infection, vascular injury required surgical repair, a low but definite risk of AV block requiring permanent pacemaker implantation with AVNRT ablation, and heart attack, stroke, bleeding, and death associated with left atrial ablation. The patient understands these risks and wishes to defer  EP study and ablation at present. 2. Hyperlipidemia  - On Lipitor. 3. GERD  - On Protonix. 4. Vitamin D deficiency  - On Vitamin D3.    5. Cigarette smoking  - Advised cessation. Recommendations:  1. Continue Toprol XL 25 mg daily. 2. Patient to consider electrophysiology study with RF ablation of SVT  3. No need to take ASA for PSVT. 4. Avoid caffeine in take. 5. Follow up in 3 months. Encouraged the patient to call the office for any questions or concerns.     I have spent a total of 60 minutes with the patient and the family reviewing the above stated recommendations. And a total of >50% of that time involved face-to-face time providing counseling and or coordination of care with the other providers. Thank you for allowing me to participate in your patient's care. Please call me if there are any questions or concerns. William Mcgowan MD  Cardiac Electrophysiology  [facility] [place]: [place] [facility]  7:00 AM  10/6/2020    Addendum:    Office note reviewed. No change has been made. The patient seen and examined. The risks, benefits, and alternatives to EP study and catheter ablation were reviewed in detail today, including bleeding, blood clot, infection, vascular injury required surgical repair, a low but definite risk of AV block requiring permanent pacemaker implantation with AVNRT ablation, and heart attack, stroke, bleeding, and death associated with left atrial ablation. The patient verbalizes understanding and agrees to proceed with the procedure.       William Mcgowan MD  Cardiac Electrophysiology  [facility]  [facility] [place]: [place] [facility]  7:01 AM  10/6/2020

## 2020-10-06 NOTE — OP NOTE
[address] [facility] Electrophysiology  Procedure Report  PATIENT: Karishma Barlow  MEDICAL RECORD NUMBER: [id number]  DATE OF PROCEDURE:  10/6/2020  ATTENDING ELECTROPHYSIOLOGIST: Shaniqua Rust MD  REFERRING PHYSICIAN: Dr. Rasheed River  1965  Date of Service: 10/6/2020    CC: This is a 54 y.o. female with a history of symptomatic supraventricular tachycardia. PROCEDURE PERFORMED:  1. Electrophysiology study with induction + atrioventricular node reentry tachycardia (SVT) ablation (10220)  2. Left atrial recording and pacing via the coronary sinus catheter which sits posterior and inferior to the left YUIGQU(09717-24)  3. 3-dimensional electranatomic cardiac mapping (77025)  4. Isoproterenol drug infusion for initiation of arrhythmia (83138-14-61)  5. Vascular ultrasound for venous access    INDICATION FOR PROCEDURE:  1. Symptomatic supraventricular tachycardia. PROCEDURE PERFORMED By: Shaniqua Rust MD    MEDICATIONS:  1. Isoproterenol 24.1 mcg IV. ESTIMATED BLOOD LOSS: Less than 10 mL. COMPLICATIONS:  None immediately apparent    FLUOROSCOPY TIME: 15.8 minutes. DESCRIPTION OF PROCEDURE: The risks, benefits, and alternatives to the procedure were discussed with the patient, and informed consent was obtained. The patient was brought to the electrophysiology lab and was prepped and draped in the usual fashion. The right and left groins were anesthetized using 2% lidocaine, and the right and left femoral veins were accessed using an 18-gauge Cook needle and ultrasound guidance. Using the modified Seldinger technique, one 8-Prydeinig, one 7-Prydeinig and two 6-Prydeinig hemostatic sheaths were introduced over a J-tip guidewire.  One 6-Prydeinig deflectable quadripolar catheter and one 6-Prydeinig Cassidy quadripolar catheter were introduced under fluoroscopic guidance to the high right atrium and right ventricular base, respectively. A 5-Greek CRD-2 quadripolar catheter was placed in the His bundle region. A Droplr dynamic deflectable decapolar catheter was introduced to the coronary sinus (which sits posterior and inferior to the left-atrium). Diagnostic EP study was then performed, followed by radiofrequency catheter ablation of the AV node slow pathway. BASELINE ELECTROPHYSIOLOGIC DATA:   Baseline intervals were as follows:   Sinus cycle length was 1000 ms  AH interval was 105 ms   HV interval was 50 ms   VA Wenckebach cycle length was 250 ms  Retrograde conduction was concentric and decremental  The VERP was 320 ms at 500 ms  The AV Wenckebach cycle length was 430 ms  Ventricular preexcitation was absent during pacing from both the high right atrium and coronary sinus (left atrial pacing and recording). The ERP of the AV laura fast pathway was 370 ms at 500 ms  The ERP of the AV laura slow pathway was 250 ms at 500 ms    TACHYCARDIA CHARACTERISTICS:   A nonsustained narrow complex tachycardia at a cycle length of 350 msec was easily induced with atrial extra stimuli with maximum duration of 4 beats. No sustained arrhythmia could be induced both on and off Isuprel infusion. Dual AV laura physiology with AH jump was observed. The septal VA time was 26 msec. The nonsustained NCT spontaneously terminated with the A. Ventricular entrainment and His-refractory PVCs can no be performed. These findings confirmed a diagnosis of nonsustained typical slow-fast AV node reentry tachycardia. RADIOFREQUENCY ABLATION DATA:   A 4 mm Nav[employer] bidirectional D and F curve ablation catheter was advanced to the region of right atrium and triangle of Brush. Using the Friendemic system we created a 3D- EAM of the RA/coronary sinus with particular focus on the region of the [facility].   Utilizing electrogram analysis looking for a favorable V:A ratio as well as the electro-anatomic map we started in the most inferior portion of the traditional \"slow pathway\" region of the triangle to begin ablation. A total of 4 RF applications were delivered throughout the study. Settings were a temperature of 55 degrees, power output of 20-50 humphreys, and duration of 30-60s. The patient was observed for 30 minutes post ablation and no residual SVT could be induced. FINAL ELECTROPHYSIOLOGIC DATA:   Sinus cycle length was  730 ms  AH interval was 115 ms  HV interval was 40 ms  AV Wenckebach cycle length was 390 ms  ERP of the AV node fast pathway was 390 ms at 500 ms  ERP of the AV laura slow pathway was 320 ms at 500 ms  Single laura echo beats were noted only  VA Wenckebach cycle length was 260 ms    Isuprel was initiated and titrated to effect at a maximum of 2 mcg. Repeat EP test was performed which showed  AV Wenckebach cycle length was 290 ms  ERP of the AV node was less than or equal to 270 ms at 400 ms  No echo beats were observed during Isuprel infusion. No induction of SVT could be performed in the baseline, isuprel, or withdrawal states    Catheters were removed under flourosopic guidance and sheaths were removed and manual pressure was held for hemostasis. The patient was hemodynamically stable and under the care of the CRNA and will be brought back to the recovery area. SUMMARY:   1. Nonsustained atrioventricular node reentry tachycardia. 2. Successful RF ablation of atrioventricular node reentry tachycardia (slow pathway modification). 3. Normal His-Purkinje system function    RECOMMENDATIONS:   1.  The patient will be observed in the recovery area and will be discharged home after 4 hours of bed rest.   2. Follow-up within 1 month for post operative evaluation.     Rachel Castrejon MD  Cardiac Electrophysiology  [facility]) Physicians  The Heart and Vascular [place]: [place] Electrophysiology  7:36 AM  10/6/2020

## 2020-10-06 NOTE — ANESTHESIA POSTPROCEDURE EVALUATION
Department of Anesthesiology  Postprocedure Note    Patient: Dave Timmons  MRN: [id number]  YOB: 1965  Date of evaluation: 10/6/2020  Time:  3:31 PM     Procedure Summary     Date:  10/06/20 Room / Location:  [facility] CATH LAB    Anesthesia Start:  0730 Anesthesia Stop:  6160    Procedure:  ABLATION WITH ANESTHESIA Diagnosis:  Supraventricular tachycardia    Scheduled Providers:  PENNIE Howell CRNA; Ajit Cornell MD Responsible Provider:  Ajit Cornell MD    Anesthesia Type:  MAC ASA Status:  3          Anesthesia Type: MAC    Steven Phase I:      Steven Phase II:      Last vitals: Reviewed and per EMR flowsheets.        Anesthesia Post Evaluation    Patient location during evaluation: PACU  Patient participation: complete - patient participated  Level of consciousness: awake and alert  Airway patency: patent  Nausea & Vomiting: no nausea and no vomiting  Complications: no  Cardiovascular status: hemodynamically stable and blood pressure returned to baseline  Respiratory status: acceptable  Hydration status: euvolemic

## 2020-10-06 NOTE — ANESTHESIA PRE PROCEDURE
encounter. Allergies: Allergies   Allergen Reactions    Cortisone Other (See Comments)     Redness and breaking into a sweat.    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    DDD (degenerative disc disease), lumbar M51.36    Elbow pain M25.529    Lateral epicondylitis M77.10    Paroxysmal SVT (supraventricular tachycardia) (HCC) I47.1    Palpitations R00.2    Primary osteoarthritis of left knee M17.12    Mixed hyperlipidemia E78.2    Vitamin D deficiency E55.9    Bronchitis, acute, with bronchospasm J20.9    Exertional dyspnea R06.00    Primary osteoarthritis of right knee M17.11    Acute medial meniscal tear S83.249A    Complex tear of lateral meniscus of right knee as current injury S83.271A    Synovitis M65.9    Chronic obstructive pulmonary disease (HCC) J44.9    Atypical chest pain R07.89    Symptomatic cholelithiasis K80.20    Gastroesophageal reflux disease without esophagitis K21.9    Chest pain R07.9       Past Medical History:        Diagnosis Date    At high risk for deep venous thrombosis     Cancer ([facility] [address]     cervical    COPD (chronic obstructive pulmonary disease) (HCC)     early stage    DJD (degenerative joint disease) of knee 7/18/2014    GERD (gastroesophageal reflux disease)     H/O cardiovascular stress test 05/13/2020    Lexiscan    Headache     Hiatal hernia     Mixed hyperlipidemia 10/25/2016    SV[facility]) ([facility])     had episode of SVT 6 months ago and several small episode over last couple months       Past Surgical History:        Procedure Laterality Date    BACK SURGERY      lumbar pins & screws    BONE BIOPSY      CHOLECYSTECTOMY, LAPAROSCOPIC N/A 3/6/2020    CHOLECYSTECTOMY LAPAROSCOPIC performed by Celina Lawson MD at [facility]      ENDOSCOPY, COLON, DIAGNOSTIC      KNEE ARTHROSCOPY Right 05/05/2017    KNEE SURGERY Left     arthroscopy    TONSILLECTOMY      TUBAL LIGATION      UPPER GASTROINTESTINAL ENDOSCOPY N/A 3/6/2020    EGD ESOPHAGOGASTRODUODENOSCOPY performed by Celina Lawson MD at [address] History:    Social History     Tobacco Use    Smoking status: Current Every Day Smoker     Packs/day: 1.00     Years: 30.00     Pack years: 30.00     Types: Cigarettes    Smokeless tobacco: Never Used   Substance Use Topics    Alcohol use: Yes     Comment: rarely                                Ready to quit: Not Answered  Counseling given: Not Answered      Vital Signs (Current):   Vitals:    10/06/20 0629   BP: 114/88   Resp: 20   Temp: 36.3 °C (97.3 °F)   SpO2: 97%   Weight: 190 lb (86.2 kg)   Height: 5' 8\" (1.727 m)                                              BP Readings from Last 3 Encounters:   10/06/20 114/88   10/01/20 112/82   09/27/20 112/87       NPO Status:   >8 hrs                                                                             BMI:   Wt Readings from Last 3 Encounters:   10/06/20 190 lb (86.2 kg)   10/01/20 196 lb (88.9 kg)   08/05/20 180 lb (81.6 kg)     Body mass index is 28.89 kg/m².     CBC:   Lab Results   Component Value Date    WBC 5.2 10/06/2020    RBC 4.49 10/06/2020    HGB 14.0 10/06/2020    HCT 42.5 10/06/2020    MCV 94.7 10/06/2020    RDW 12.2 10/06/2020     10/06/2020       CMP:   Lab Results   Component Value Date     10/06/2020    K 4.4 10/06/2020    K 4.1 08/05/2020     10/06/2020    CO2 27 10/06/2020    BUN 12 10/06/2020    CREATININE 0.7 10/06/2020    GFRAA >60 10/06/2020    LABGLOM >60 10/06/2020    GLUCOSE 99 10/06/2020    GLUCOSE 95 04/22/2012    PROT 7.1 09/26/2020    CALCIUM 9.6 10/06/2020    BILITOT 0.3 09/26/2020    ALKPHOS 115 09/26/2020    AST 21 09/26/2020    ALT 27 09/26/2020       POC Tests: No results for input(s): POCGLU, POCNA, POCK, POCCL, POCBUN, POCHEMO, POCHCT in the last 72 hours. Coags:   Lab Results   Component Value Date    PROTIME 9.9 09/26/2020    INR 0.9 09/26/2020       HCG (If Applicable):   Lab Results   Component Value Date    PREGTESTUR NEGATIVE 03/06/2020        ABGs: No results found for: PHART, PO2ART, VXI4VDZ, VXJ8BSJ, BEART, L7APXWVW     Type & Screen (If Applicable):  No results found for: LABABO, LABRH    Drug/Infectious Status (If Applicable):  No results found for: HIV, HEPCAB    COVID-19 Screening (If Applicable):   Lab Results   Component Value Date    COVID19 Not Detected 10/02/2020         Anesthesia Evaluation  Patient summary reviewed and Nursing notes reviewed history of anesthetic complications (\"shakes coming out of anesthesia\"): Airway: Mallampati: III  TM distance: <3 FB   Neck ROM: full  Mouth opening: > = 3 FB Dental:    (+) upper dentures and partials  Comment: Upper full, lower partial denture    Pulmonary: breath sounds clear to auscultation  (+) COPD:  shortness of breath:  current smoker (1ppd)                          ROS comment: Inhaler BID   Cardiovascular:  Exercise tolerance: poor (<4 METS),   (+) dysrhythmias: SVT, [place]AR[place]:,         Rhythm: regular  Rate: normal      Cleared by cardiology              Neuro/Psych:   (+) neuromuscular disease:, headaches:, depression/anxiety              ROS comment: LBP chronic w radiation R leg  Lumbar steinberg, kareem  DDD GI/Hepatic/Renal:   (+) hiatal hernia, GERD: well controlled,          ROS comment: Hx cholecystectomy, endometrial ablation. Endo/Other:    (+) : arthritis: OA., .                 Abdominal:   (+) obese,     Abdomen: soft. Vascular:                                        Anesthesia Plan      MAC     ASA 3       Induction: intravenous. Anesthetic plan and risks discussed with patient.     Use of blood products discussed with patient whom. Plan discussed with attending.                   Jorge Galdamez, APRN - CRNA   10/6/2020

## 2020-10-13 ENCOUNTER — TELEPHONE (OUTPATIENT)
Dept: CARDIOLOGY | Age: 55
End: 2020-10-13

## 2020-10-13 NOTE — TELEPHONE ENCOUNTER
I called Brannon Baez to see how she's doing since her Ablation on 10/6/20. There was no answer. I left a message reminding her to report any palpitations, bleeding, drainage,or swelling from her groin incisions to Dr. Skyler Adkins. I also reminded him of her follow up appt w/ Dr. Skyler Adkins on 11/3/20 at 3:15 pm.  I left my number ([phone]) if he wishes to call me back.

## 2020-10-26 RX ORDER — PANTOPRAZOLE SODIUM 40 MG/1
40 TABLET, DELAYED RELEASE ORAL
Qty: 30 TABLET | Refills: 3 | Status: SHIPPED
Start: 2020-10-26 | End: 2021-03-05 | Stop reason: SDUPTHER

## 2020-10-26 RX ORDER — ATORVASTATIN CALCIUM 20 MG/1
20 TABLET, FILM COATED ORAL DAILY
Qty: 30 TABLET | Refills: 3 | Status: SHIPPED
Start: 2020-10-26 | End: 2021-03-05 | Stop reason: SDUPTHER

## 2020-10-26 RX ORDER — ACETAMINOPHEN 160 MG
1 TABLET,DISINTEGRATING ORAL DAILY
Qty: 30 CAPSULE | Refills: 3 | Status: SHIPPED
Start: 2020-10-26 | End: 2021-03-05 | Stop reason: SDUPTHER

## 2020-11-02 NOTE — PROGRESS NOTES
[facility] [address] [place] [address] Electrophysiology  Progress Note  PATIENT: Karishma Barlow  MEDICAL RECORD NUMBER: [id number]  DATE OF SERVICE:  11/3/2020  ATTENDING ELECTROPHYSIOLOGIST: Shaniqua Rust MD  REFERRING PHYSICIAN: Nilsa Rao DO  CHIEF COMPLAINT: Supraventricular tachycardia    HPI: This is a 54 y.o. female with a history of   Patient Active Problem List   Diagnosis    DJD (degenerative joint disease) of knee    Medial meniscus tear    Elbow arthritis    DDD (degenerative disc disease), lumbar    Elbow pain    Lateral epicondylitis    SVT (supraventricular tachycardia) (HCC)    Palpitations    Primary osteoarthritis of left knee    Mixed hyperlipidemia    Vitamin D deficiency    Bronchitis, acute, with bronchospasm    Exertional dyspnea    Primary osteoarthritis of right knee    Acute medial meniscal tear    Complex tear of lateral meniscus of right knee as current injury    Synovitis    Chronic obstructive pulmonary disease ([facility] [address]    Atypical chest pain    Symptomatic cholelithiasis    Gastroesophageal reflux disease without esophagitis    Chest pain   who presents to cardiac electrophysiology clinic for follow up of SVT. 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 She subsequently underwent slow pathway modification. Since the procedure, the patient reports feeling overall well but has a slow \"thumping\" sensation which she relates to a skipped beat. She presents today in SR. The patient denies any chest pain, dyspnea, dizziness, syncope, orthopnea or paroxysmal nocturnal dyspnea.     Patient Active Problem List    Diagnosis Date Noted    Chest pain 05/12/2020    Symptomatic cholelithiasis     Gastroesophageal reflux disease without esophagitis     Chronic obstructive pulmonary disease ([address] 11/12/2018    Atypical chest pain 11/12/2018    Complex tear of lateral meniscus of right knee as current injury 05/05/2017    Synovitis 05/05/2017    Primary osteoarthritis of right knee 03/13/2017    Acute medial meniscal tear 03/13/2017    Exertional dyspnea 12/06/2016    Bronchitis, acute, with bronchospasm 11/07/2016    Mixed hyperlipidemia 10/25/2016    Vitamin D deficiency 10/25/2016    Primary osteoarthritis of left knee 05/24/2016    SVT (supraventricular tachycardia) (HCC)     Palpitations     Elbow pain 02/26/2015    Lateral epicondylitis 02/26/2015    DDD (degenerative disc disease), lumbar 11/03/2014    Elbow arthritis 08/26/2014    DJD (degenerative joint disease) of knee 07/18/2014    Medial meniscus tear 07/18/2014       Past Medical History:   Diagnosis Date    At high risk for deep venous thrombosis     Cancer ([facility] [address]     cervical    COPD (chronic obstructive pulmonary disease) (HCC)     early stage    DJD (degenerative joint disease) of knee 7/18/2014    GERD (gastroesophageal reflux disease)     H/O cardiovascular stress test 05/13/2020    Lexiscan    Headache     Hiatal hernia     Mixed hyperlipidemia 10/25/2016    SVT (supraventricular tachycardia) (HCC)     had episode of SVT 6 months ago and several small episode over last couple months       Family History   Problem Relation Age of Onset    High Blood Pressure Mother     High Blood Pressure Father        Social History     Tobacco Use    Smoking status: Current Every Day Smoker     Packs/day: 1.00     Years: 30.00     Pack years: 30.00     Types: Cigarettes    Smokeless tobacco: Never Used   Substance Use Topics    Alcohol use: Yes     Comment: rarely       Current Outpatient Medications   Medication Sig Dispense Refill    budesonide-formoterol (SYMBICORT) 160-4.5 MCG/ACT AERO Inhale 2 puffs into the lungs 2 times daily      atorvastatin (LIPITOR) 20 MG tablet Take 1 tablet by mouth daily 30 tablet 3    pantoprazole (PROTONIX) 40 MG tablet Take 1 tablet by mouth every morning (before breakfast) 30 tablet 3    Cholecalciferol (VITAMIN D3) 50 MCG (2000 UT) CAPS Take 1 capsule by mouth daily 30 capsule 3    metoprolol succinate (TOPROL XL) 25 MG extended release tablet Take 1 tablet by mouth daily 90 tablet 1    ibuprofen (ADVIL;MOTRIN) 800 MG tablet Take 1 tablet by mouth every 6 hours as needed for Pain 20 tablet 0    nicotine (NICODERM CQ) 21 MG/24HR Place 1 patch onto the skin daily (Patient not taking: Reported on 11/3/2020) 42 patch 0     No current facility-administered medications for this visit. Allergies   Allergen Reactions    Cortisone Other (See Comments)     Redness and breaking into a sweat.  Medrol [Methylprednisolone]      Fever; redness; sweat    Oxycontin [Oxycodone Hcl] Itching    Penicillins      Cant remember reaction was from childhood       ROS:   Constitutional: Negative for fever, activity change and appetite change. HENT: Negative for epistaxis. Eyes: Negative for diploplia, blurred vision. Respiratory: Negative for cough, chest tightness, shortness of breath and wheezing. Cardiovascular: pertinent positives in HPI  Gastrointestinal: Negative for abdominal pain and blood in stool. All other review of systems are negative     PHYSICAL EXAM:   Vitals:    11/03/20 1500   BP: 116/78   Resp: 20   Temp: 98 °F (36.7 °C)   TempSrc: Temporal   Weight: 200 lb (90.7 kg)   Height: 5' 8\" (1.727 m)   Constitutional: Well-developed, no acute distress  Eyes: conjunctivae normal, no xanthelasma   Ears, Nose, Throat: oral mucosa moist, no cyanosis   CV: no JVD. Regular rate and rhythm. Normal S1S2 and no S3. No murmurs, rubs, or gallops. PMI is nondisplaced  Lungs: clear to auscultation bilaterally, normal respiratory effort without used of accessory muscles  Abdomen: soft, non-tender, bowel sounds present, no masses or hepatomegaly   Musculoskeletal: no digital clubbing, no edema. Well healed both groins and no hematoma.    Skin: warm, no rashes     I have personally reviewed the laboratory, cardiac diagnostic and radiographic testing as outlined below:    Data:    No results for input(s): WBC, HGB, HCT, PLT in the last 72 hours. No results for input(s): NA, K, CL, CO2, BUN, CREATININE, CALCIUM in the last 72 hours. Invalid input(s): GLU, MAGNESIUM   Lab Results   Component Value Date    MG 2.2 2020     No results for input(s): TSH in the last 72 hours. No results for input(s): INR in the last 72 hours. CXR 20:   FINDINGS:    Slight elevation of the right hemidiaphragm.  Clear lungs.  No definite    findings of pneumothorax or pleural effusion.  Normal mediastinal, hilar, and    cardiac contours.  No acute fracture.              Impression    No acute cardiopulmonary process.           EK/3/20: SR, rate: 87 bpm, PRWP, low voltage, QTc 409 msec. No delta wave. - Please see scan in Cardiology. Echocardiogram 20:   Findings      Left Ventricle   Ejection fraction is visually estimated at 55%. No regional wall motion   abnormalities seen. Mild left ventricular concentric hypertrophy noted. Left ventricle size is normal. Normal left ventricular diastolic filling   pattern for age. Right Ventricle   Normal right ventricle structure and function. Left Atrium   Normal left atrium. Right Atrium   Normal right atrium. Mitral Valve   Structurally normal mitral valve. No evidence of mitral valve stenosis. Physiologic and/or trace mitral regurgitation is present. Tricuspid Valve   The tricuspid valve appears structurally normal.   Physiologic and/or trace tricuspid regurgitation. Aortic Valve   The aortic valve is trileaflet. No hemodynamically significant aortic   stenosis is present. No evidence of aortic valve regurgitation. Pulmonic Valve   Pulmonic valve is structurally normal. Physiologic and/or trace pulmonic   regurgitation present.       Pericardial Effusion   No evidence for hemodynamically reflux disease without esophagitis    Chest pain    who presents with PSVT. 1. Supraventricular tachycardia  - Narrow complex with short RP.  - Terminated with IV Adenosine. 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  - No recurrence since ablation. 2. Hyperlipidemia  - On Lipitor. 3. GERD  - On Protonix. 4. Vitamin D deficiency  - On Vitamin D3.    5. Cigarette smoking  - Advised cessation. Recommendations:  1. ContinueToprol XL 25mg daily for extrasystoles. 2. Avoid caffeine and ETOH intake. 3. Follow up in 3 months. Encouraged the patient to call the office for any questions or concerns. I have spent a total of 30 minutes with the patient and the family reviewing the above stated recommendations. And a total of >50% of that time involved face-to-face time providing counseling and or coordination of care with the other providers. Thank you for allowing me to participate in your patient's care. Please call me if there are any questions or concerns.       Reva Jimenez MD  Cardiac Electrophysiology  [facility]) Physicians  [facility] [place] [employer]: [facility]  3:12 PM  11/3/2020

## 2020-11-03 ENCOUNTER — OFFICE VISIT (OUTPATIENT)
Dept: NON INVASIVE DIAGNOSTICS | Age: 55
End: 2020-11-03
Payer: MEDICARE

## 2020-11-03 VITALS
TEMPERATURE: 98 F | RESPIRATION RATE: 20 BRPM | DIASTOLIC BLOOD PRESSURE: 78 MMHG | SYSTOLIC BLOOD PRESSURE: 116 MMHG | HEART RATE: 87 BPM | WEIGHT: 200 LBS | BODY MASS INDEX: 30.31 KG/M2 | HEIGHT: 68 IN

## 2020-11-03 PROCEDURE — 3017F COLORECTAL CA SCREEN DOC REV: CPT | Performed by: INTERNAL MEDICINE

## 2020-11-03 PROCEDURE — 99214 OFFICE O/P EST MOD 30 MIN: CPT | Performed by: INTERNAL MEDICINE

## 2020-11-03 PROCEDURE — G8484 FLU IMMUNIZE NO ADMIN: HCPCS | Performed by: INTERNAL MEDICINE

## 2020-11-03 PROCEDURE — G8417 CALC BMI ABV UP PARAM F/U: HCPCS | Performed by: INTERNAL MEDICINE

## 2020-11-03 PROCEDURE — 4004F PT TOBACCO SCREEN RCVD TLK: CPT | Performed by: INTERNAL MEDICINE

## 2020-11-03 PROCEDURE — G8427 DOCREV CUR MEDS BY ELIG CLIN: HCPCS | Performed by: INTERNAL MEDICINE

## 2020-11-03 PROCEDURE — 93000 ELECTROCARDIOGRAM COMPLETE: CPT | Performed by: INTERNAL MEDICINE

## 2020-11-03 RX ORDER — BUDESONIDE AND FORMOTEROL FUMARATE DIHYDRATE 160; 4.5 UG/1; UG/1
2 AEROSOL RESPIRATORY (INHALATION) 2 TIMES DAILY
COMMUNITY
End: 2021-06-23 | Stop reason: SDUPTHER

## 2020-11-22 ENCOUNTER — HOSPITAL ENCOUNTER (EMERGENCY)
Age: 55
Discharge: HOME | End: 2020-11-22
Attending: FAMILY MEDICINE
Payer: MEDICARE

## 2020-11-22 VITALS
HEART RATE: 98 BPM | SYSTOLIC BLOOD PRESSURE: 120 MMHG | RESPIRATION RATE: 16 BRPM | DIASTOLIC BLOOD PRESSURE: 84 MMHG | WEIGHT: 200 LBS | HEIGHT: 68 IN | OXYGEN SATURATION: 97 % | TEMPERATURE: 97.3 F | BODY MASS INDEX: 30.31 KG/M2

## 2020-11-22 LAB — STREP GRP A PCR: NEGATIVE

## 2020-11-22 PROCEDURE — 99283 EMERGENCY DEPT VISIT LOW MDM: CPT

## 2020-11-22 PROCEDURE — 87880 STREP A ASSAY W/OPTIC: CPT

## 2020-11-22 PROCEDURE — U0003 INFECTIOUS AGENT DETECTION BY NUCLEIC ACID (DNA OR RNA); SEVERE ACUTE RESPIRATORY SYNDROME CORONAVIRUS 2 (SARS-COV-2) (CORONAVIRUS DISEASE [COVID-19]), AMPLIFIED PROBE TECHNIQUE, MAKING USE OF HIGH THROUGHPUT TECHNOLOGIES AS DESCRIBED BY CMS-2020-01-R: HCPCS

## 2020-11-22 RX ORDER — BROMPHENIRAMINE MALEATE, PSEUDOEPHEDRINE HYDROCHLORIDE, AND DEXTROMETHORPHAN HYDROBROMIDE 2; 30; 10 MG/5ML; MG/5ML; MG/5ML
5 SYRUP ORAL 4 TIMES DAILY PRN
Qty: 120 ML | Refills: 0 | Status: SHIPPED | OUTPATIENT
Start: 2020-11-22 | End: 2021-04-22

## 2020-11-22 RX ORDER — DOXYCYCLINE HYCLATE 100 MG
100 TABLET ORAL 2 TIMES DAILY
Qty: 20 TABLET | Refills: 0 | Status: SHIPPED | OUTPATIENT
Start: 2020-11-22 | End: 2020-12-02

## 2020-11-22 ASSESSMENT — PAIN DESCRIPTION - PROGRESSION: CLINICAL_PROGRESSION: GRADUALLY WORSENING

## 2020-11-22 ASSESSMENT — PAIN SCALES - GENERAL: PAINLEVEL_OUTOF10: 7

## 2020-11-22 ASSESSMENT — PAIN DESCRIPTION - LOCATION: LOCATION: THROAT

## 2020-11-22 ASSESSMENT — PAIN DESCRIPTION - PAIN TYPE: TYPE: ACUTE PAIN

## 2020-11-22 ASSESSMENT — PAIN DESCRIPTION - FREQUENCY: FREQUENCY: CONTINUOUS

## 2020-11-22 ASSESSMENT — PAIN DESCRIPTION - DESCRIPTORS: DESCRIPTORS: DISCOMFORT

## 2020-11-22 ASSESSMENT — PAIN DESCRIPTION - ORIENTATION: ORIENTATION: POSTERIOR

## 2020-11-22 NOTE — ED PROVIDER NOTES
smokeless tobacco. She reports current alcohol use. She reports that she does not use drugs. Family History: family history includes High Blood Pressure in her father and mother. Allergies: Cortisone; Medrol [methylprednisolone]; Oxycontin [oxycodone hcl]; and Penicillins    Physical Exam           ED Triage Vitals [11/22/20 1049]   BP Temp Temp src Pulse Resp SpO2 Height Weight   120/84 97.3 °F (36.3 °C) -- 98 16 97 % -- --      Oxygen Saturation Interpretation: Normal.    · Constitutional:  Alert, development consistent with age. · Ears:  External Ears: Bilateral normal.               TM's & External Canals: normal appearance. · Nose:   There is purulent rhinorrhea. · Sinuses: mild Bilateral maxillary sinus tenderness. no Bilateral frontal sinus tenderness. · Mouth:  normal tongue and buccal mucosa. · Throat: no erythema or exudates noted. Teeth and gums normal..  Airway Patent. · Neck:  Supple. There is no  anterior cervical and posterior cervical node tenderness. · Respiratory:   Breath sounds: Bilateral normal.  Lung sounds: normal.   · CV:  Regular rate and rhythm, normal heart sounds, without pathological murmurs, ectopy, gallops, or rubs. · GI:  Abdomen Soft, nontender, good bowel sounds. No firm or pulsatile mass. · Integument:  Normal turgor. Warm, dry, without visible rash. · Neurological:  Oriented. Motor functions intact. Lab / Imaging Results   (All laboratory and radiology results have been personally reviewed by myself)  Labs:  Results for orders placed or performed during the hospital encounter of 11/22/20   Strep Screen Group A Throat    Specimen: Throat   Result Value Ref Range    Strep Grp A PCR Negative Negative       Imaging: All Radiology results interpreted by Radiologist unless otherwise noted.   No orders to display       ED Course / Medical Decision Making   Medications - No data to display   Consults:   None    Procedures:   none    Medical

## 2020-11-23 RX ORDER — METOPROLOL SUCCINATE 25 MG/1
25 TABLET, EXTENDED RELEASE ORAL DAILY
Qty: 90 TABLET | Refills: 1 | Status: SHIPPED
Start: 2020-11-23 | End: 2021-04-22

## 2020-11-24 LAB
SARS-COV-2: DETECTED
SOURCE: ABNORMAL

## 2021-02-09 ENCOUNTER — CARE COORDINATION (OUTPATIENT)
Dept: CARE COORDINATION | Age: 56
End: 2021-02-09

## 2021-02-16 ENCOUNTER — CARE COORDINATION (OUTPATIENT)
Dept: CARE COORDINATION | Age: 56
End: 2021-02-16

## 2021-03-05 DIAGNOSIS — E78.2 MIXED HYPERLIPIDEMIA: ICD-10-CM

## 2021-03-05 DIAGNOSIS — K80.20 CALCULUS OF GALLBLADDER WITHOUT CHOLECYSTITIS WITHOUT OBSTRUCTION: ICD-10-CM

## 2021-03-05 DIAGNOSIS — E55.9 VITAMIN D DEFICIENCY: ICD-10-CM

## 2021-03-05 DIAGNOSIS — K21.9 GASTROESOPHAGEAL REFLUX DISEASE WITHOUT ESOPHAGITIS: ICD-10-CM

## 2021-03-05 RX ORDER — ACETAMINOPHEN 160 MG
1 TABLET,DISINTEGRATING ORAL DAILY
Qty: 30 CAPSULE | Refills: 3 | Status: SHIPPED
Start: 2021-03-05 | End: 2021-07-26 | Stop reason: SDUPTHER

## 2021-03-05 RX ORDER — ATORVASTATIN CALCIUM 20 MG/1
20 TABLET, FILM COATED ORAL DAILY
Qty: 30 TABLET | Refills: 3 | Status: SHIPPED
Start: 2021-03-05 | End: 2021-07-26 | Stop reason: SDUPTHER

## 2021-03-05 RX ORDER — PANTOPRAZOLE SODIUM 40 MG/1
40 TABLET, DELAYED RELEASE ORAL
Qty: 30 TABLET | Refills: 3 | Status: SHIPPED
Start: 2021-03-05 | End: 2021-07-26 | Stop reason: SDUPTHER

## 2021-04-21 NOTE — PROGRESS NOTES
[facility] [address] [place] [address] Electrophysiology  Progress Note  PATIENT: Ezzie Cushing  MEDICAL RECORD NUMBER: [id number]  DATE OF SERVICE:  4/22/2021  ATTENDING ELECTROPHYSIOLOGIST: Khadijah Orantes MD  REFERRING PHYSICIAN: Michael Vergara DO  CHIEF COMPLAINT: Supraventricular tachycardia    HPI: This is a 54 y.o. female with a history of   Patient Active Problem List   Diagnosis    DJD (degenerative joint disease) of knee    Medial meniscus tear    Elbow arthritis    DDD (degenerative disc disease), lumbar    Elbow pain    Lateral epicondylitis    SVT (supraventricular tachycardia) (HCC)    Palpitations    Primary osteoarthritis of left knee    Mixed hyperlipidemia    Vitamin D deficiency    Bronchitis, acute, with bronchospasm    Exertional dyspnea    Primary osteoarthritis of right knee    Acute medial meniscal tear    Complex tear of lateral meniscus of right knee as current injury    Synovitis    Chronic obstructive pulmonary disease ([facility] [address]    Atypical chest pain    Symptomatic cholelithiasis    Gastroesophageal reflux disease without esophagitis    Chest pain   who presents to cardiac electrophysiology clinic for follow up of SVT. 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 She subsequently underwent slow pathway modification. Since the procedure, the patient reports feeling overall well. She reported that she stopped taking Toprol XL for the past 1 month. She reports rare palpitations. She presents today in SR. The patient denies any chest pain, dyspnea, dizziness, syncope, orthopnea or paroxysmal nocturnal dyspnea.     Patient Active Problem List    Diagnosis Date Noted    Chest pain 05/12/2020    Symptomatic cholelithiasis     Gastroesophageal reflux disease without esophagitis     Chronic obstructive pulmonary disease ([facility] [address] 11/12/2018    Atypical chest pain 11/12/2018    Complex tear of lateral meniscus of right knee as current injury 05/05/2017    Synovitis 05/05/2017    Primary osteoarthritis of right knee 03/13/2017    Acute medial meniscal tear 03/13/2017    Exertional dyspnea 12/06/2016    Bronchitis, acute, with bronchospasm 11/07/2016    Mixed hyperlipidemia 10/25/2016    Vitamin D deficiency 10/25/2016    Primary osteoarthritis of left knee 05/24/2016    SVT (supraventricular tachycardia) (HCC)     Palpitations     Elbow pain 02/26/2015    Lateral epicondylitis 02/26/2015    DDD (degenerative disc disease), lumbar 11/03/2014    Elbow arthritis 08/26/2014    DJD (degenerative joint disease) of knee 07/18/2014    Medial meniscus tear 07/18/2014       Past Medical History:   Diagnosis Date    At high risk for deep venous thrombosis     Cancer ([facility] [address]     cervical    COPD (chronic obstructive pulmonary disease) (HCC)     early stage    DJD (degenerative joint disease) of knee 7/18/2014    GERD (gastroesophageal reflux disease)     H/O cardiovascular stress test 05/13/2020    Lexiscan    Headache     Hiatal hernia     Mixed hyperlipidemia 10/25/2016    SVT (supraventricular tachycardia) (HCC)     had episode of SVT 6 months ago and several small episode over last couple months       Family History   Problem Relation Age of Onset    High Blood Pressure Mother     High Blood Pressure Father        Social History     Tobacco Use    Smoking status: Current Every Day Smoker     Packs/day: 1.00     Years: 30.00     Pack years: 30.00     Types: Cigarettes    Smokeless tobacco: Never Used   Substance Use Topics    Alcohol use: Yes     Comment: rarely       Current Outpatient Medications   Medication Sig Dispense Refill    Cholecalciferol (VITAMIN D3) 50 MCG (2000 UT) CAPS Take 1 capsule by mouth daily 30 capsule 3    atorvastatin (LIPITOR) 20 MG tablet Take 1 tablet by mouth daily 30 tablet 3    pantoprazole (PROTONIX) 40 MG tablet Take 1 tablet by mouth every morning (before breakfast) 30 tablet 3    budesonide-formoterol (SYMBICORT) 160-4.5 MCG/ACT AERO Inhale 2 puffs into the lungs 2 times daily      ibuprofen (ADVIL;MOTRIN) 800 MG tablet Take 1 tablet by mouth every 6 hours as needed for Pain 20 tablet 0    metoprolol succinate (TOPROL XL) 25 MG extended release tablet Take 1 tablet by mouth daily (Patient not taking: Reported on 4/22/2021) 90 tablet 1    brompheniramine-pseudoephedrine-DM 2-30-10 MG/5ML syrup Take 5 mLs by mouth 4 times daily as needed for Congestion (Patient not taking: Reported on 4/22/2021) 120 mL 0     No current facility-administered medications for this visit. Allergies   Allergen Reactions    Cortisone Other (See Comments)     Redness and breaking into a sweat.  Medrol [Methylprednisolone]      Fever; redness; sweat    Oxycontin [Oxycodone Hcl] Itching    Penicillins      Cant remember reaction was from childhood       ROS:   Constitutional: Negative for fever, activity change and appetite change. HENT: Negative for epistaxis. Eyes: Negative for diploplia, blurred vision. Respiratory: Negative for cough, chest tightness, shortness of breath and wheezing. Cardiovascular: pertinent positives in HPI  Gastrointestinal: Negative for abdominal pain and blood in stool. All other review of systems are negative     PHYSICAL EXAM:   Vitals:    04/22/21 1051   BP: 116/82   Site: Left Upper Arm   Position: Sitting   Cuff Size: Medium Adult   Pulse: 81   Resp: 20   SpO2: 99%   Weight: 206 lb (93.4 kg)   Height: 5' 8\" (1.727 m)   Constitutional: Well-developed, no acute distress  Eyes: conjunctivae normal, no xanthelasma   Ears, Nose, Throat: oral mucosa moist, no cyanosis   CV: no JVD. Regular rate and rhythm. Normal S1S2 and no S3. No murmurs, rubs, or gallops.  PMI is nondisplaced  Lungs: clear to auscultation bilaterally, normal respiratory effort without used of accessory muscles  Abdomen: soft, non-tender, bowel sounds present, no masses or hepatomegaly   Musculoskeletal: no digital clubbing, no edema. Well healed both groins and no hematoma. Skin: warm, no rashes     I have personally reviewed the laboratory, cardiac diagnostic and radiographic testing as outlined below:    Data:    No results for input(s): WBC, HGB, HCT, PLT in the last 72 hours. No results for input(s): NA, K, CL, CO2, BUN, CREATININE, CALCIUM in the last 72 hours. Invalid input(s): GLU, MAGNESIUM   Lab Results   Component Value Date    MG 2.2 2020     No results for input(s): TSH in the last 72 hours. No results for input(s): INR in the last 72 hours. EK21: SR, rate: 81 bpm, PRWP, low voltage, QTc 408 msec. No delta wave. - Please see scan in Cardiology. Echocardiogram 20:   Findings      Left Ventricle   Ejection fraction is visually estimated at 55%. No regional wall motion   abnormalities seen. Mild left ventricular concentric hypertrophy noted. Left ventricle size is normal. Normal left ventricular diastolic filling   pattern for age. Right Ventricle   Normal right ventricle structure and function. Left Atrium   Normal left atrium. Right Atrium   Normal right atrium. Mitral Valve   Structurally normal mitral valve. No evidence of mitral valve stenosis. Physiologic and/or trace mitral regurgitation is present. Tricuspid Valve   The tricuspid valve appears structurally normal.   Physiologic and/or trace tricuspid regurgitation. Aortic Valve   The aortic valve is trileaflet. No hemodynamically significant aortic   stenosis is present. No evidence of aortic valve regurgitation. Pulmonic Valve   Pulmonic valve is structurally normal. Physiologic and/or trace pulmonic   regurgitation present. Pericardial Effusion   No evidence for hemodynamically significant pericardial effusion. Aorta   Normal aortic root and ascending aorta.    Miscellaneous   The inferior vena cava diameter is normal with normal respiratory   variation. Conclusions      Summary   Ejection fraction is visually estimated at 55%. No regional wall motion abnormalities seen. Mild left ventricular concentric hypertrophy noted. Normal right ventricle structure and function. Physiologic and/or trace mitral regurgitation is present.       Signature      ----------------------------------------------------------------   Electronically signed by Carla Heck DO(Interpreting   physician) on 05/13/2020 11:03 AM   ----------------------------------------------------------------     M-Mode/2D Measurements & Calculations      LV Diastolic     LV Systolic Dimension:    AV Cusp Separation: 2.2 cmLA   Dimension: 4.8   3.5 cm                    Dimension: 3.2 cmAO Root   cm               LV Volume Diastolic:      Dimension: 3.3 cm   LV FS:27.1 %     107.2 ml   LV PW Diastolic: LV Volume Systolic: 84.8   1.1 cm           ml   Septum           LV EDV/LV EDV Index:      RV Diastolic Dimension: 2.2 cm   Diastolic: 1.2   099.9 mlLV ESV/LV ESV   cm               Index: 49.4 ml   CO: 4.79 l/min   EF Calculated: 53.9 %     LA volume/Index: 28.7 ml   LV Mass: 206.37   g                       LVOT: 2 cm     Doppler Measurements & Calculations      MV Peak E-Wave: 0.71 AV Peak Velocity:     LVOT Peak Velocity: 1.03 m/s   m/s                  1.25 m/s              LVOT Mean Velocity: 0.75 m/s   MV Peak A-Wave: 0.61 AV Peak Gradient:     LVOT Peak Gradient: 4.3   m/s                  6.24 mmHg             mmHgLVOT Mean Gradient: 2.5   MV E/A Ratio: 1.18   AV Mean Velocity:     mmHg   MV Peak Gradient:    0.93 m/s   3.5 mmHg             AV Mean Gradient: 3.8   MV Mean Gradient:    mmHg   1.6 mmHg             AV VTI: 28 cm         TR Velocity:1.92 m/s   MV Mean Velocity:    AV Area               TR Gradient:14.78 mmHg   0.59 m/s             (Continuity):2.44     PV Peak Velocity: 1.04 m/s   MV Deceleration      cm^2                  PV Peak Gradient: 4.34 mmHg   Time: 203.3 msec                           PV Mean Velocity: 0.75 m/s   MV P1/2t: 71.6 msec  LVOT VTI: 21.8 cm     PV Mean Gradient: 2.5 mmHg   MVA by PHT:3.07 cm^2 IVRT: 96.9 msec   MV Area   (continuity): 3 cm^2    Stress Test 5/13/20:  FINDINGS:    Perfusion:         Image quality is good with no significant attenuation artifact.         There are no fixed or reversible perfusion defects.         Summed stress score:  0         Summed rest score:  0         Summed reversibility score:  0         Scores are visually adjusted for potential artifact.         TID score:  1.28 (threshold value of 1.39 is used for Lexiscan stress with    Tc-99m)         Function:         End diastolic volume:  86SQ         Left ventricular ejection fraction:  Greater than 70%         Wall motion abnormalities:  None.              Impression    Perfusion:  Normal exam         Function:  Normal exam      Outpatient Monitor 11/20/15: I have independently reviewed all of the ECGs and rhythm strips per above     Assessment/Plan: This is a 54 y.o. female with a history of   Patient Active Problem List   Diagnosis    DJD (degenerative joint disease) of knee    Medial meniscus tear    Elbow arthritis    DDD (degenerative disc disease), lumbar    Elbow pain    Lateral epicondylitis    SVT (supraventricular tachycardia) ([facility])    Palpitations    Primary osteoarthritis of left knee    Mixed hyperlipidemia    Vitamin D deficiency    Bronchitis, acute, with bronchospasm    Exertional dyspnea    Primary osteoarthritis of right knee    Acute medial meniscal tear    Complex tear of lateral meniscus of right knee as current injury    Synovitis    Chronic obstructive pulmonary disease ([facility] [address]    Atypical chest pain    Symptomatic cholelithiasis    Gastroesophageal reflux disease without esophagitis    Chest pain     1.  Supraventricular tachycardia  - Narrow complex with short RP.  - Terminated with IV Adenosine. 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  - No recurrence since ablation. 2. Hyperlipidemia  - On Lipitor. 3. GERD  - On Protonix. 4. Vitamin D deficiency  - On Vitamin D3.    5. Cigarette smoking  - Advised cessation. Recommendations:    1. Discontinue Toprol XL. 2. Advised to avoid caffeine and ETOH intake. 3. Follow up in 6 months. Encouraged the patient to call the office for any questions or concerns. Thank you for allowing me to participate in your patient's care. Please call me if there are any questions or concerns. I have spent a total of 30 minutes with the patient and the family reviewing the above stated recommendations. And a total of >50% of that time involved face-to-face time providing counseling and or coordination of care with the other providers, preparation for the clinic visit, reviewing records/tests, counseling/education of the patient, ordering medications/tests/procedures, coordinating care, and documenting clinical information in the EHR.      Lauryn White MD  Cardiac Electrophysiology  [address]  [facility] [place] [place] [facility]  11:01 AM  4/22/2021

## 2021-04-22 ENCOUNTER — OFFICE VISIT (OUTPATIENT)
Dept: NON INVASIVE DIAGNOSTICS | Age: 56
End: 2021-04-22
Payer: MEDICARE

## 2021-04-22 VITALS
DIASTOLIC BLOOD PRESSURE: 82 MMHG | WEIGHT: 206 LBS | BODY MASS INDEX: 31.22 KG/M2 | HEART RATE: 81 BPM | OXYGEN SATURATION: 99 % | RESPIRATION RATE: 20 BRPM | HEIGHT: 68 IN | SYSTOLIC BLOOD PRESSURE: 116 MMHG

## 2021-04-22 DIAGNOSIS — I47.1 PAROXYSMAL SVT (SUPRAVENTRICULAR TACHYCARDIA) (HCC): Primary | ICD-10-CM

## 2021-04-22 PROCEDURE — 4004F PT TOBACCO SCREEN RCVD TLK: CPT | Performed by: INTERNAL MEDICINE

## 2021-04-22 PROCEDURE — 93000 ELECTROCARDIOGRAM COMPLETE: CPT | Performed by: INTERNAL MEDICINE

## 2021-04-22 PROCEDURE — 3017F COLORECTAL CA SCREEN DOC REV: CPT | Performed by: INTERNAL MEDICINE

## 2021-04-22 PROCEDURE — G8427 DOCREV CUR MEDS BY ELIG CLIN: HCPCS | Performed by: INTERNAL MEDICINE

## 2021-04-22 PROCEDURE — G8417 CALC BMI ABV UP PARAM F/U: HCPCS | Performed by: INTERNAL MEDICINE

## 2021-04-22 PROCEDURE — 99214 OFFICE O/P EST MOD 30 MIN: CPT | Performed by: INTERNAL MEDICINE

## 2021-06-17 ENCOUNTER — TELEPHONE (OUTPATIENT)
Dept: FAMILY MEDICINE CLINIC | Age: 56
End: 2021-06-17

## 2021-06-17 NOTE — LETTER
Karen Mascorro DO  [address] [place] [address]  Phone: [phone]  Fax: [phone]        June 17, 2021     [id number]      Dear Juan Antonio:    Our records indicate that you are due for a mammogram.    In the [place], one in nine women will develop breast cancer during their lifetime. While there is no way to prevent breast cancer, early detection provides the best opportunity for curing it. For women over the age of 36, the [address] [place] [address] Ave recommends a yearly clinical breast exam and a yearly mammogram. These practices have saved thousands of lives. We need your help to ensure that you are receiving optimal medical care. Please make an appointment for a mammogram at your earliest convenience.     Sincerely,        Karen Mascorro DO

## 2021-06-21 ENCOUNTER — HOSPITAL ENCOUNTER (EMERGENCY)
Age: 56
Discharge: HOME | End: 2021-06-21
Attending: EMERGENCY MEDICINE
Payer: MEDICARE

## 2021-06-21 ENCOUNTER — APPOINTMENT (OUTPATIENT)
Dept: GENERAL RADIOLOGY | Age: 56
End: 2021-06-21
Payer: MEDICARE

## 2021-06-21 VITALS
TEMPERATURE: 97.9 F | BODY MASS INDEX: 29.55 KG/M2 | RESPIRATION RATE: 16 BRPM | SYSTOLIC BLOOD PRESSURE: 130 MMHG | HEART RATE: 86 BPM | OXYGEN SATURATION: 98 % | HEIGHT: 68 IN | WEIGHT: 195 LBS | DIASTOLIC BLOOD PRESSURE: 84 MMHG

## 2021-06-21 DIAGNOSIS — S49.91XA INJURY OF RIGHT SHOULDER, INITIAL ENCOUNTER: Primary | ICD-10-CM

## 2021-06-21 PROCEDURE — 96372 THER/PROPH/DIAG INJ SC/IM: CPT

## 2021-06-21 PROCEDURE — 6360000002 HC RX W HCPCS: Performed by: EMERGENCY MEDICINE

## 2021-06-21 PROCEDURE — 93005 ELECTROCARDIOGRAM TRACING: CPT | Performed by: EMERGENCY MEDICINE

## 2021-06-21 PROCEDURE — 73030 X-RAY EXAM OF SHOULDER: CPT

## 2021-06-21 PROCEDURE — 99282 EMERGENCY DEPT VISIT SF MDM: CPT

## 2021-06-21 RX ORDER — IBUPROFEN 800 MG/1
800 TABLET ORAL EVERY 8 HOURS PRN
Qty: 12 TABLET | Refills: 0 | Status: SHIPPED | OUTPATIENT
Start: 2021-06-21 | End: 2022-01-03

## 2021-06-21 RX ORDER — KETOROLAC TROMETHAMINE 30 MG/ML
30 INJECTION, SOLUTION INTRAMUSCULAR; INTRAVENOUS ONCE
Status: COMPLETED | OUTPATIENT
Start: 2021-06-21 | End: 2021-06-21

## 2021-06-21 RX ADMIN — KETOROLAC TROMETHAMINE 30 MG: 30 INJECTION, SOLUTION INTRAMUSCULAR; INTRAVENOUS at 15:33

## 2021-06-21 ASSESSMENT — PAIN DESCRIPTION - LOCATION
LOCATION: SHOULDER
LOCATION: SHOULDER

## 2021-06-21 ASSESSMENT — PAIN DESCRIPTION - DESCRIPTORS
DESCRIPTORS: DISCOMFORT
DESCRIPTORS: ACHING

## 2021-06-21 ASSESSMENT — PAIN DESCRIPTION - FREQUENCY
FREQUENCY: CONTINUOUS
FREQUENCY: CONTINUOUS

## 2021-06-21 ASSESSMENT — PAIN DESCRIPTION - ONSET: ONSET: ON-GOING

## 2021-06-21 ASSESSMENT — PAIN DESCRIPTION - ORIENTATION
ORIENTATION: RIGHT
ORIENTATION: RIGHT

## 2021-06-21 ASSESSMENT — PAIN DESCRIPTION - PROGRESSION: CLINICAL_PROGRESSION: GRADUALLY IMPROVING

## 2021-06-21 ASSESSMENT — PAIN DESCRIPTION - PAIN TYPE
TYPE: ACUTE PAIN
TYPE: ACUTE PAIN

## 2021-06-21 ASSESSMENT — PAIN - FUNCTIONAL ASSESSMENT: PAIN_FUNCTIONAL_ASSESSMENT: 0-10

## 2021-06-21 ASSESSMENT — PAIN SCALES - GENERAL
PAINLEVEL_OUTOF10: 4
PAINLEVEL_OUTOF10: 7
PAINLEVEL_OUTOF10: 9

## 2021-06-21 NOTE — ED PROVIDER NOTES
HPI:  6/21/21, Time: 3:29 PM EDT         Anca Kelly is a 54 y.o. female presenting to the ED for right shoulder pain, beginning 2 weeks ago after pulling on a swimming pool. States has pain of lifts shoulder to side, forward or backward any further than a small motion. She is right-handed. She denies direct injury. She denies right upper extremity paresthesias or weakness, neck pain, chest pain or shortness of breath or other complaints. She states she has not done anything at home to help relieve the pain. She is worried about rotator cuff. The complaint has been persistent, moderate in severity, and worsened by movement. Patient denies any other complaints at this time. ROS:   A complete review of systems was performed and all pertinent positives and negatives are stated within HPI, all other systems reviewed and are negative.      --------------------------------------------- PAST HISTORY ---------------------------------------------  Past Medical History:  has a past medical history of At high risk for deep venous thrombosis, Cancer (HCC), COPD (chronic obstructive pulmonary disease) ([facility] [address] DJD (degenerative joint disease) of knee, GERD (gastroesophageal reflux disease), H/O cardiovascular stress test, Headache, Hiatal hernia, Mixed hyperlipidemia, and SVT (supraventricular tachycardia) ([facility] [address] Past Surgical History:  has a past surgical history that includes back surgery; knee surgery (Left); Tonsillectomy; Tubal ligation; Endometrial ablation; Knee arthroscopy (Right, 05/05/2017); Endometrial ablation; bone biopsy; Endoscopy, colon, diagnostic; Cholecystectomy, laparoscopic (N/A, 3/6/2020); Upper gastrointestinal endoscopy (N/A, 3/6/2020); and ablation of dysrhythmic focus (10/06/2020). Social History:  reports that she has been smoking cigarettes. She has a 30.00 pack-year smoking history. She has never used smokeless tobacco. She reports current alcohol use.  She reports that she does not use drugs. Family History: family history includes High Blood Pressure in her father and mother. The patients home medications have been reviewed. Allergies: Cortisone, Medrol [methylprednisolone], Oxycontin [oxycodone hcl], and Penicillins        ----------------------------------------PHYSICAL EXAM--------------------------------------  Constitutional:  Well developed, well nourished, no acute distress, non-toxic appearance   Eyes:  PERRL, conjunctiva normal, EOMI  HENT:  Atraumatic, external ears normal, nose normal, oropharynx moist. Neck- normal range of motion, no nuchal rigidity   Respiratory:  No respiratory distress, normal breath sounds, no rales, no wheezing   Cardiovascular:  Normal rate, normal rhythm, no murmurs, no gallops, no rubs. Radial  pulses 2+ bilaterally. GI:  Soft, nondistended, normal bowel sounds, nontender, no rebound, no guarding   :  No costovertebral angle tenderness   Musculoskeletal:  No edema, no tenderness, no deformities. Back- no tenderness. Right shoulder without tenderness to palpation, swelling, redness, warmth or deformity. She has active and passive range of motion up to 45 degrees in forward flexion, extension, internal rotation, external rotation and abduction before pain significantly limits her range of motion. Integument:  Well hydrated, no visible rash. Adequate perfusion. Neurologic:  Alert & oriented x 3, CN 2-12 normal, no focal deficits noted. Normal gait. Right axillary nerve intact. Right median, radial and ulnar nerve sensation intact to light touch and strength 5/5. Psychiatric:  Speech and behavior appropriate       -------------------------------------------------- RESULTS -------------------------------------------------  I have personally reviewed all laboratory and imaging results for this patient. Results are listed below.      LABS:  Results for orders placed or performed during the hospital encounter of 06/21/21   EKG 12 Lead Result Value Ref Range    Ventricular Rate 78 BPM    Atrial Rate 78 BPM    P-R Interval 158 ms    QRS Duration 80 ms    Q-T Interval 384 ms    QTc Calculation (Bazett) 437 ms    P Axis 60 degrees    R Axis 8 degrees    T Axis 20 degrees       RADIOLOGY:  Interpreted by Radiologist.  XR SHOULDER RIGHT (MIN 2 VIEWS)   Final Result   No acute abnormality.             ------------------------- NURSING NOTES AND VITALS REVIEWED ---------------------------  The nursing notes within the ED encounter and vital signs as below have been reviewed by myself. /84   Pulse 86   Temp 97.9 °F (36.6 °C) (Temporal)   Resp 16   Ht 5' 8\" (1.727 m)   Wt 195 lb (88.5 kg)   SpO2 98%   BMI 29.65 kg/m²   Oxygen Saturation Interpretation: Normal      The patients available past medical records and past encounters were reviewed. ------------------------------ ED COURSE/MEDICAL DECISION MAKING----------------------  Medications   ketorolac (TORADOL) injection 30 mg (30 mg Intramuscular Given 6/21/21 1533)       EKG:  Time: 3:34 PM EDT  This EKG is signed and interpreted by me. Rate: 78  Rhythm: Sinus  Interpretation: no acute changes  Comparison: none      Procedures:   none      Medical Decision Making:    Neg acute on xray. IM toradol in ER for pain. Advised to move shoulder around to avoid frozen shoulder, Rx motrin and f/u with PCP or ortho for MRI. She is NV intact and ambulatory upon discharge. Patient was explicitly instructed on specific signs and symptoms on which to return to the emergency room for. Patient was instructed to return to the ER for any new or worsening symptoms. Additional discharge instructions were given verbally. All questions were answered. Patient is comfortable and agreeable with discharge plan. Patient in no acute distress and non-toxic in appearance.        This patient's ED course included: a personal history and physicial eaxmination    This patient has remained hemodynamically stable and remained unchanged during their ED course. Re-Evaluations:  Time: 4:40 PM EDT   Re-evaluation. Patients symptoms show no change  Repeat physical examination is not changed      Consultations:   none    Critical Care: none    IPam, , am the Primary Provider of Record    Counseling: The emergency provider has spoken with the patient and discussed todays results, in addition to providing specific details for the plan of care and counseling regarding the diagnosis and prognosis. Questions are answered at this time and they are agreeable with the plan.    --------------------------- IMPRESSION AND DISPOSITION ---------------------------------    IMPRESSION  1.  Injury of right shoulder, initial encounter        DISPOSITION  Disposition: Discharge to home  Patient condition is stable             Zully Massey DO  06/21/21 1640

## 2021-06-22 ENCOUNTER — CARE COORDINATION (OUTPATIENT)
Dept: CARE COORDINATION | Age: 56
End: 2021-06-22

## 2021-06-22 LAB
EKG ATRIAL RATE: 78 BPM
EKG P AXIS: 60 DEGREES
EKG P-R INTERVAL: 158 MS
EKG Q-T INTERVAL: 384 MS
EKG QRS DURATION: 80 MS
EKG QTC CALCULATION (BAZETT): 437 MS
EKG R AXIS: 8 DEGREES
EKG T AXIS: 20 DEGREES
EKG VENTRICULAR RATE: 78 BPM

## 2021-06-22 PROCEDURE — 93010 ELECTROCARDIOGRAM REPORT: CPT | Performed by: INTERNAL MEDICINE

## 2021-06-22 NOTE — CARE COORDINATION
ACM contacted Roxanna Rosas to offer enrollment in Care Coordination. Care Coordination explained to patient. She denies needs at this time and declines enrollment.

## 2021-06-23 ENCOUNTER — TELEPHONE (OUTPATIENT)
Dept: FAMILY MEDICINE CLINIC | Age: 56
End: 2021-06-23

## 2021-06-23 ENCOUNTER — OFFICE VISIT (OUTPATIENT)
Dept: FAMILY MEDICINE CLINIC | Age: 56
End: 2021-06-23
Payer: MEDICARE

## 2021-06-23 VITALS
RESPIRATION RATE: 16 BRPM | WEIGHT: 203 LBS | BODY MASS INDEX: 30.77 KG/M2 | HEART RATE: 83 BPM | DIASTOLIC BLOOD PRESSURE: 82 MMHG | OXYGEN SATURATION: 94 % | HEIGHT: 68 IN | TEMPERATURE: 97 F | SYSTOLIC BLOOD PRESSURE: 122 MMHG

## 2021-06-23 DIAGNOSIS — Z12.11 COLON CANCER SCREENING: ICD-10-CM

## 2021-06-23 DIAGNOSIS — J44.9 CHRONIC OBSTRUCTIVE PULMONARY DISEASE, UNSPECIFIED COPD TYPE (HCC): ICD-10-CM

## 2021-06-23 DIAGNOSIS — Z12.31 ENCOUNTER FOR SCREENING MAMMOGRAM FOR MALIGNANT NEOPLASM OF BREAST: ICD-10-CM

## 2021-06-23 DIAGNOSIS — M25.511 ACUTE PAIN OF RIGHT SHOULDER: Primary | ICD-10-CM

## 2021-06-23 PROCEDURE — G8926 SPIRO NO PERF OR DOC: HCPCS | Performed by: NURSE PRACTITIONER

## 2021-06-23 PROCEDURE — G8417 CALC BMI ABV UP PARAM F/U: HCPCS | Performed by: NURSE PRACTITIONER

## 2021-06-23 PROCEDURE — 4004F PT TOBACCO SCREEN RCVD TLK: CPT | Performed by: NURSE PRACTITIONER

## 2021-06-23 PROCEDURE — G8427 DOCREV CUR MEDS BY ELIG CLIN: HCPCS | Performed by: NURSE PRACTITIONER

## 2021-06-23 PROCEDURE — 3023F SPIROM DOC REV: CPT | Performed by: NURSE PRACTITIONER

## 2021-06-23 PROCEDURE — 99213 OFFICE O/P EST LOW 20 MIN: CPT | Performed by: NURSE PRACTITIONER

## 2021-06-23 PROCEDURE — 3017F COLORECTAL CA SCREEN DOC REV: CPT | Performed by: NURSE PRACTITIONER

## 2021-06-23 RX ORDER — BUDESONIDE AND FORMOTEROL FUMARATE DIHYDRATE 160; 4.5 UG/1; UG/1
2 AEROSOL RESPIRATORY (INHALATION) 2 TIMES DAILY
Qty: 1 INHALER | Refills: 1 | Status: SHIPPED
Start: 2021-06-23 | End: 2022-01-03 | Stop reason: SDUPTHER

## 2021-06-23 SDOH — ECONOMIC STABILITY: FOOD INSECURITY: WITHIN THE PAST 12 MONTHS, THE FOOD YOU BOUGHT JUST DIDN'T LAST AND YOU DIDN'T HAVE MONEY TO GET MORE.: NEVER TRUE

## 2021-06-23 SDOH — ECONOMIC STABILITY: FOOD INSECURITY: WITHIN THE PAST 12 MONTHS, YOU WORRIED THAT YOUR FOOD WOULD RUN OUT BEFORE YOU GOT MONEY TO BUY MORE.: NEVER TRUE

## 2021-06-23 ASSESSMENT — ENCOUNTER SYMPTOMS
NAUSEA: 0
COUGH: 0
CONSTIPATION: 0
VOMITING: 0
SHORTNESS OF BREATH: 1
DIARRHEA: 0
WHEEZING: 0

## 2021-06-23 ASSESSMENT — PATIENT HEALTH QUESTIONNAIRE - PHQ9
1. LITTLE INTEREST OR PLEASURE IN DOING THINGS: 0
SUM OF ALL RESPONSES TO PHQ9 QUESTIONS 1 & 2: 0
2. FEELING DOWN, DEPRESSED OR HOPELESS: 0
SUM OF ALL RESPONSES TO PHQ QUESTIONS 1-9: 0

## 2021-06-23 ASSESSMENT — SOCIAL DETERMINANTS OF HEALTH (SDOH): HOW HARD IS IT FOR YOU TO PAY FOR THE VERY BASICS LIKE FOOD, HOUSING, MEDICAL CARE, AND HEATING?: NOT VERY HARD

## 2021-06-23 NOTE — PROGRESS NOTES
Inga Ferrara (:  1965) is a 54 y.o. female,Established patient, here for evaluation of the following chief complaint(s):  Shoulder Pain (pt went to ED on 21 for RT shoulder pain)         ASSESSMENT/PLAN:  1. Acute pain of right shoulder  -     [facility] [employer], DO, Orthopaedics and Sports [facility], [place]  -ROM exercises as tolerated  -ibuprofen 800 mg    2. Chronic obstructive pulmonary disease, unspecified COPD type (HCC)  -     budesonide-formoterol (SYMBICORT) 160-4.5 MCG/ACT AERO; Inhale 2 puffs into the lungs 2 times daily, Disp-1 Inhaler, R-1Normal  -encouraged smoking cessation  -stable, recheck in one year    3. Encounter for screening mammogram for malignant neoplasm of breast  -     [facility] DIGITAL SCREEN BILATERAL; Future    4. Colon cancer screening  -     Cologuard (For External Results Only); Future      Return if symptoms worsen or fail to improve. Subjective   SUBJECTIVE/OBJECTIVE:  Complains of right shoulder pain for past 2 weeks. Onset after pulling on a swimming pool. [facility] ER . Pain is getting worse and aggravated by movement. Right hand dominant. She tried ice but that made the pain worse. Toradol injection was not effective. Review of Systems   Constitutional: Negative for activity change, appetite change and unexpected weight change. Respiratory: Positive for shortness of breath (chronic and stable). Negative for cough and wheezing. Cardiovascular: Positive for palpitations. Negative for chest pain. Gastrointestinal: Negative for constipation, diarrhea, nausea and vomiting. Musculoskeletal: Positive for arthralgias and myalgias. Neurological: Positive for light-headedness and numbness (hands at night). Negative for weakness and headaches. Objective   Physical Exam  Constitutional:       General: She is not in acute distress. Appearance: Normal appearance. She is well-developed.    HENT:      Head: Normocephalic and atraumatic. Neck:      Thyroid: No thyromegaly. Trachea: No tracheal deviation. Cardiovascular:      Rate and Rhythm: Normal rate and regular rhythm. Pulses: Normal pulses. Heart sounds: No murmur heard. Pulmonary:      Effort: Pulmonary effort is normal.      Breath sounds: Normal breath sounds. No wheezing or rales. Chest:      Chest wall: No tenderness. Abdominal:      General: Bowel sounds are normal.      Palpations: Abdomen is soft. Tenderness: There is no abdominal tenderness. Musculoskeletal:         General: Tenderness present. Comments: Right shoulder is diffusely painful, ROM very limited due to pain   Lymphadenopathy:      Cervical: No cervical adenopathy. Skin:     General: Skin is warm and dry. Capillary Refill: Capillary refill takes less than 2 seconds. Neurological:      Mental Status: She is alert and oriented to person, place, and time. Psychiatric:         Behavior: Behavior normal.            DIPESH chung      An electronic signature was used to authenticate this note.     --Meli Lawson, APRN - CNP

## 2021-07-01 ENCOUNTER — OFFICE VISIT (OUTPATIENT)
Dept: ORTHOPEDIC SURGERY | Age: 56
End: 2021-07-01
Payer: MEDICAID

## 2021-07-01 VITALS — WEIGHT: 203 LBS | TEMPERATURE: 98 F | HEIGHT: 68 IN | BODY MASS INDEX: 30.77 KG/M2

## 2021-07-01 DIAGNOSIS — S46.011A TRAUMATIC COMPLETE TEAR OF RIGHT ROTATOR CUFF, INITIAL ENCOUNTER: Primary | ICD-10-CM

## 2021-07-01 PROCEDURE — 4004F PT TOBACCO SCREEN RCVD TLK: CPT | Performed by: ORTHOPAEDIC SURGERY

## 2021-07-01 PROCEDURE — G8427 DOCREV CUR MEDS BY ELIG CLIN: HCPCS | Performed by: ORTHOPAEDIC SURGERY

## 2021-07-01 PROCEDURE — 99213 OFFICE O/P EST LOW 20 MIN: CPT | Performed by: ORTHOPAEDIC SURGERY

## 2021-07-01 PROCEDURE — G8417 CALC BMI ABV UP PARAM F/U: HCPCS | Performed by: ORTHOPAEDIC SURGERY

## 2021-07-01 PROCEDURE — 3017F COLORECTAL CA SCREEN DOC REV: CPT | Performed by: ORTHOPAEDIC SURGERY

## 2021-07-01 NOTE — PROGRESS NOTES
Chief Complaint   Patient presents with    Shoulder Pain     Right Shoulder x 1 month, had pain after putting up a swimming pool, went to the ER 06/20/2021          Jean Claude Kurtz is a 54y.o. year old   female who is seen today  for evaluation of right shoulder pain. She reports the pain has been ongoing for the past 1 months. She does recall a specific injury which started the pain. She was putting up a swimming pool with her granddaughter and she injured her shoulder. She reports the pain is worse with activity, better with rest.  The patient does have mechanical symptoms. Shedoes have night pain. She denies a feeling of instability. The prior treatments have been none. The patient   has not responded to the treatment. The patient is right hand dominant. The patient is not working. The patients occupation is unemployed.        Chief Complaint   Patient presents with    Shoulder Pain     Right Shoulder x 1 month, had pain after putting up a swimming pool, went to the ER 06/20/2021       Past Medical History:   Diagnosis Date    At high risk for deep venous thrombosis     Cancer ([facility]     cervical    COPD (chronic obstructive pulmonary disease) ([facility]     early stage    DJD (degenerative joint disease) of knee 7/18/2014    GERD (gastroesophageal reflux disease)     H/O cardiovascular stress test 05/13/2020    Lexiscan    Headache     Hiatal hernia     Mixed hyperlipidemia 10/25/2016    SVT (supraventricular tachycardia) (HCC)     had episode of SVT 6 months ago and several small episode over last couple months     Past Surgical History:   Procedure Laterality Date    ABLATION OF DYSRHYTHMIC FOCUS  10/06/2020    Succesful Ablation AVN Reentry Tachycardia    (Dr. Megan Marcano)    BACK SURGERY      lumbar pins & screws    BONE BIOPSY      CHOLECYSTECTOMY, LAPAROSCOPIC N/A 3/6/2020    CHOLECYSTECTOMY LAPAROSCOPIC performed by Chase Aponte MD at [address] [facility] ENDOMETRIAL ABLATION      ENDOSCOPY, COLON, DIAGNOSTIC      KNEE ARTHROSCOPY Right 05/05/2017    KNEE SURGERY Left     arthroscopy    TONSILLECTOMY      TUBAL LIGATION      UPPER GASTROINTESTINAL ENDOSCOPY N/A 3/6/2020    EGD ESOPHAGOGASTRODUODENOSCOPY performed by Vale Laura MD at [address]       Current Outpatient Medications:     budesonide-formoterol (SYMBICORT) 160-4.5 MCG/ACT AERO, Inhale 2 puffs into the lungs 2 times daily, Disp: 1 Inhaler, Rfl: 1    ibuprofen (ADVIL;MOTRIN) 800 MG tablet, Take 1 tablet by mouth every 8 hours as needed for Pain, Disp: 12 tablet, Rfl: 0    Cholecalciferol (VITAMIN D3) 50 MCG (2000 UT) CAPS, Take 1 capsule by mouth daily, Disp: 30 capsule, Rfl: 3    atorvastatin (LIPITOR) 20 MG tablet, Take 1 tablet by mouth daily, Disp: 30 tablet, Rfl: 3    pantoprazole (PROTONIX) 40 MG tablet, Take 1 tablet by mouth every morning (before breakfast), Disp: 30 tablet, Rfl: 3  Allergies   Allergen Reactions    Cortisone Other (See Comments)     Redness and breaking into a sweat.     Medrol [Methylprednisolone]      Fever; redness; sweat    Oxycontin [Oxycodone Hcl] Itching    Penicillins      Cant remember reaction was from childhood     Social History     Socioeconomic History    Marital status:      Spouse name: Not on file    Number of children: Not on file    Years of education: Not on file    Highest education level: Not on file   Occupational History    Not on file   Tobacco Use    Smoking status: Current Every Day Smoker     Packs/day: 1.00     Years: 30.00     Pack years: 30.00     Types: Cigarettes    Smokeless tobacco: Never Used   Vaping Use    Vaping Use: Never used   Substance and Sexual Activity    Alcohol use: Yes     Comment: rarely    Drug use: No    Sexual activity: Not on file   Other Topics Concern    Not on file   Social History Narrative    Not on file     Social Determinants of Health     Financial Resource Strain: Low Risk     Difficulty of Paying Living Expenses: Not very hard   Food Insecurity: No Food Insecurity    Worried About Running Out of Food in the Last Year: Never true    Ran Out of Food in the Last Year: Never true   Transportation Needs:     Lack of Transportation (Medical):  Lack of Transportation (Non-Medical):    Physical Activity:     Days of Exercise per Week:     Minutes of Exercise per Session:    Stress:     Feeling of Stress :    Social Connections:     Frequency of Communication with Friends and Family:     Frequency of Social Gatherings with Friends and Family:     Attends [employer] Services:     Active Member of Clubs or Organizations:     Attends Club or Organization Meetings:     Marital Status:    Intimate Partner Violence:     Fear of Current or Ex-Partner:     Emotionally Abused:     Physically Abused:     Sexually Abused:      Family History   Problem Relation Age of Onset    High Blood Pressure Mother     High Blood Pressure Father        REVIEW OF SYSTEMS:     General/Constitution:  (-)weight loss, (-)fever, (-)chills, (-)weakness. Skin: (-) rash,(-) psoriasis,(-) eczema, (-)skin cancer. Musculoskeletal: (-) fractures,  (-) dislocations,(-) collagen vascular disease, (-) fibromyalgia, (-) multiple sclerosis, (-) muscular dystrophy, (-) RSD,(-) joint pain (-)swelling, (-) joint pain,swelling. Neurologic: (-) epilepsy, (-)seizures,(-) brain tumor,(-) TIA, (-)stroke, (-)headaches, (-)Parkinson disease,(-) memory loss, (-) LOC. Cardiovascular: (-) Chest pain, (-) swelling in legs/feet, (-) SOB, (-) cramping in legs/feet with walking. Respiratory: (-) SOB, (-) Coughing, (-) night sweats. GI: (-) nausea, (-) vomiting, (-) diarrhea, (-) blood in stool, (-) gastric ulcer. Psychiatric: (-) Depression, (-) Anxiety, (-) bipolar disease, (-) Alzheimer's Disease  Allergic/Immunologic: (-) allergies latex, (-) allergies metal, (-) skin sensitivity.   Hematlogic: (-) anemia, (-) blood transfusion, (-) DVT/PE, (-) Clotting disorders      Subjective:    Constitution:  Temp 98 °F (36.7 °C)   Ht 5' 8\" (1.727 m)   Wt 203 lb (92.1 kg)   BMI 30.87 kg/m²     Psycihatric:  The patient is alert and oriented x 3, appears to be stated age and in no distress. Respiratory:  Respiratory effort is not labored. Patient is not gasping. Palpation of the chest reveals no tactile fremitus. Skin:  Upon inspection: the skin appears warm, dry and intact. There is not a previous scar over the affected area. There is not any cellulitis, lymphedema or cutaneous lesions noted in the lower extremities. Upon palpation there is no induration noted. Neurologic:  Motor exam of the upper extremities show: The reflexes in biceps/triceps/brachioradialis are equal and symmetric. Sensory exam C5-T1 are normal bilaterally. Cardiovascular: The vascular exam is normal and is well perfused to distal extremities. There are 2+ radial pulses bilaterally, and motor and sensation is intact to median, ulnar, and radial, musclocutaneus, and axillary nerve distribution and grossly symmetric bilaterally. There is cap refill noted less than two seconds in all digits. There is not edema of the bilateral upper extremities. There is not varicosities noted in the distal extremities. Lymph:  Upon palpation,  there is no lymphadenopathy noted in bilateral upper extremities. Musculoskeletal:  Gait: normal; examination of the nails and digits reveal no cyanosis or clubbing. Cervical Exam:  On physical exam, Sánchez García is well-developed, well-nourished, oriented to person, place and time. her gait is normal.  On evaluation of hercervical spine, She has full range of motion of the cervical spine without pain. There is no cervical tenderness to palpation. Shoulder Exam:  On evaluation of her bilaterally upper extremities, her right shoulder has no deformity.   There is tenderness upon palpation of the anterior and lateral shoulder. There is not evidence of scapular dyskinesis. There is not muscle atrophy in shoulder girdle. The range of motion for the Right Shoulder is 120/30/BL and for the Left shoulder is 160/45/T8. Right shoulder Motor strength is 5-/5 in the supraspinatus, 5-/5 internal rotation and 5-/5 in external rotation, and Left shoulder motor strength 5/5 in supraspinatus, 5/5 in internal rotation, 5/5 in external rotation. Right shoulder:  positive Impingement , positive Delgado ,positive  Speeds,negative  Apprehension ,positive Farooq Load Shift, negative Malena manuver, negative Cross arm test.     Left shoulder:  negative Impingement , negative Delgado ,negative  Speeds,negative  Apprehension ,negative Farooq Load Shift, negative Malena manuver, negative Cross arm test.     XRAY:    No acute abnormality. MRI:    Not performed. Radiographic findings reviewed with patient    Impression:   Encounter Diagnosis   Name Primary?  Traumatic complete tear of right rotator cuff, initial encounter Yes       Plan: Natural history and expected course discussed. Questions answered. Educational material distributed. I discussed the treatment options with the patient. Based on my exam, I feel she may have torn her rotator cuff. I will order an MRI and see her back when the testing is complete.

## 2021-07-19 ENCOUNTER — OFFICE VISIT (OUTPATIENT)
Dept: ORTHOPEDIC SURGERY | Age: 56
End: 2021-07-19
Payer: MEDICAID

## 2021-07-19 VITALS — HEIGHT: 68 IN | WEIGHT: 203 LBS | BODY MASS INDEX: 30.77 KG/M2

## 2021-07-19 DIAGNOSIS — M75.81 TENDINITIS OF RIGHT ROTATOR CUFF: ICD-10-CM

## 2021-07-19 DIAGNOSIS — M19.011 ARTHRITIS OF RIGHT ACROMIOCLAVICULAR JOINT: Primary | ICD-10-CM

## 2021-07-19 PROCEDURE — G8428 CUR MEDS NOT DOCUMENT: HCPCS | Performed by: ORTHOPAEDIC SURGERY

## 2021-07-19 PROCEDURE — 99213 OFFICE O/P EST LOW 20 MIN: CPT | Performed by: ORTHOPAEDIC SURGERY

## 2021-07-19 PROCEDURE — 3017F COLORECTAL CA SCREEN DOC REV: CPT | Performed by: ORTHOPAEDIC SURGERY

## 2021-07-19 PROCEDURE — G8417 CALC BMI ABV UP PARAM F/U: HCPCS | Performed by: ORTHOPAEDIC SURGERY

## 2021-07-19 PROCEDURE — 4004F PT TOBACCO SCREEN RCVD TLK: CPT | Performed by: ORTHOPAEDIC SURGERY

## 2021-07-19 RX ORDER — CELECOXIB 200 MG/1
200 CAPSULE ORAL DAILY
Qty: 30 CAPSULE | Refills: 3 | Status: SHIPPED
Start: 2021-07-19 | End: 2022-01-02 | Stop reason: ALTCHOICE

## 2021-07-19 NOTE — PROGRESS NOTES
Chief Complaint   Patient presents with    Shoulder Pain     Right shoulder MRI follow up        Niru Mendoza is a 54y.o. year old   female who is seen today for follow up of her right shoulder. She recently underwent an MRI and is here to discuss the results.     Chief Complaint   Patient presents with    Shoulder Pain     Right shoulder MRI follow up     Past Medical History:   Diagnosis Date    At high risk for deep venous thrombosis     Cancer ([facility] [address]     cervical    COPD (chronic obstructive pulmonary disease) (HCC)     early stage    DJD (degenerative joint disease) of knee 7/18/2014    GERD (gastroesophageal reflux disease)     H/O cardiovascular stress test 05/13/2020    Lexiscan    Headache     Hiatal hernia     Mixed hyperlipidemia 10/25/2016    SVT (supraventricular tachycardia) (HCC)     had episode of SVT 6 months ago and several small episode over last couple months     Past Surgical History:   Procedure Laterality Date    ABLATION OF DYSRHYTHMIC FOCUS  10/06/2020    Succesful Ablation AVN Reentry Tachycardia    (Dr. Katrina Pal)    BACK SURGERY      lumbar pins & screws    BONE BIOPSY      CHOLECYSTECTOMY, LAPAROSCOPIC N/A 3/6/2020    CHOLECYSTECTOMY LAPAROSCOPIC performed by Joy Kuo MD at [address] [facility], [place], DIAGNOSTIC      KNEE ARTHROSCOPY Right 05/05/2017    KNEE SURGERY Left     arthroscopy    TONSILLECTOMY      TUBAL LIGATION      UPPER GASTROINTESTINAL ENDOSCOPY N/A 3/6/2020    EGD ESOPHAGOGASTRODUODENOSCOPY performed by Joy Kuo MD at [address]       Current Outpatient Medications:     budesonide-formoterol (SYMBICORT) 160-4.5 MCG/ACT AERO, Inhale 2 puffs into the lungs 2 times daily, Disp: 1 Inhaler, Rfl: 1    ibuprofen (ADVIL;MOTRIN) 800 MG tablet, Take 1 tablet by mouth every 8 hours as needed for Pain, Disp: 12 tablet, Rfl: 0    Cholecalciferol (VITAMIN D3) 50 MCG (2000 UT) CAPS, Take 1 capsule by mouth daily, Disp: 30 capsule, Rfl: 3    atorvastatin (LIPITOR) 20 MG tablet, Take 1 tablet by mouth daily, Disp: 30 tablet, Rfl: 3    pantoprazole (PROTONIX) 40 MG tablet, Take 1 tablet by mouth every morning (before breakfast), Disp: 30 tablet, Rfl: 3  Allergies   Allergen Reactions    Cortisone Other (See Comments)     Redness and breaking into a sweat.  Medrol [Methylprednisolone]      Fever; redness; sweat    Oxycontin [Oxycodone Hcl] Itching    Penicillins      Cant remember reaction was from childhood     Social History     Socioeconomic History    Marital status:      Spouse name: Not on file    Number of children: Not on file    Years of education: Not on file    Highest education level: Not on file   Occupational History    Not on file   Tobacco Use    Smoking status: Current Every Day Smoker     Packs/day: 1.00     Years: 30.00     Pack years: 30.00     Types: Cigarettes    Smokeless tobacco: Never Used   Vaping Use    Vaping Use: Never used   Substance and Sexual Activity    Alcohol use: Yes     Comment: rarely    Drug use: No    Sexual activity: Not on file   Other Topics Concern    Not on file   Social History Narrative    Not on file     Social Determinants of Health     Financial Resource Strain: Low Risk     Difficulty of Paying Living Expenses: Not very hard   Food Insecurity: No Food Insecurity    Worried About Running Out of Food in the Last Year: Never true    Lizette of Food in the Last Year: Never true   Transportation Needs:     Lack of Transportation (Medical):      Lack of Transportation (Non-Medical):    Physical Activity:     Days of Exercise per Week:     Minutes of Exercise per Session:    Stress:     Feeling of Stress :    Social Connections:     Frequency of Communication with Friends and Family:     Frequency of Social Gatherings with Friends and Family:     Attends Congregation Services:     Active Member of Clubs or Organizations:  Attends Club or Organization Meetings:     Marital Status:    Intimate Partner Violence:     Fear of Current or Ex-Partner:     Emotionally Abused:     Physically Abused:     Sexually Abused:      Family History   Problem Relation Age of Onset    High Blood Pressure Mother     High Blood Pressure Father        REVIEW OF SYSTEMS:     General/Constitution:  (-)weight loss, (-)fever, (-)chills, (-)weakness. Skin: (-) rash,(-) psoriasis,(-) eczema, (-)skin cancer. Musculoskeletal: (-) fractures,  (-) dislocations,(-) collagen vascular disease, (-) fibromyalgia, (-) multiple sclerosis, (-) muscular dystrophy, (-) RSD,(-) joint pain (-)swelling, (-) joint pain,swelling. Neurologic: (-) epilepsy, (-)seizures,(-) brain tumor,(-) TIA, (-)stroke, (-)headaches, (-)Parkinson disease,(-) memory loss, (-) LOC. Cardiovascular: (-) Chest pain, (-) swelling in legs/feet, (-) SOB, (-) cramping in legs/feet with walking. Respiratory: (-) SOB, (-) Coughing, (-) night sweats. GI: (-) nausea, (-) vomiting, (-) diarrhea, (-) blood in stool, (-) gastric ulcer. Psychiatric: (-) Depression, (-) Anxiety, (-) bipolar disease, (-) Alzheimer's Disease  Allergic/Immunologic: (-) allergies latex, (-) allergies metal, (-) skin sensitivity. Hematlogic: (-) anemia, (-) blood transfusion, (-) DVT/PE, (-) Clotting disorders      Subjective:    Constitution:  Ht 5' 8\" (1.727 m)   Wt 203 lb (92.1 kg)   BMI 30.87 kg/m²     Psycihatric:  The patient is alert and oriented x 3, appears to be stated age and in no distress. Respiratory:  Respiratory effort is not labored. Patient is not gasping. Palpation of the chest reveals no tactile fremitus. Skin:  Upon inspection: the skin appears warm, dry and intact. There is not a previous scar over the affected area. There is not any cellulitis, lymphedema or cutaneous lesions noted in the lower extremities. Upon palpation there is no induration noted.       Neurologic:  Motor exam of the upper extremities show: The reflexes in biceps/triceps/brachioradialis are equal and symmetric. Sensory exam C5-T1 are normal bilaterally. Cardiovascular: The vascular exam is normal and is well perfused to distal extremities. There are 2+ radial pulses bilaterally, and motor and sensation is intact to median, ulnar, and radial, musclocutaneus, and axillary nerve distribution and grossly symmetric bilaterally. There is cap refill noted less than two seconds in all digits. There is not edema of the bilateral upper extremities. There is not varicosities noted in the distal extremities. Lymph:  Upon palpation,  there is no lymphadenopathy noted in bilateral upper extremities. Musculoskeletal:  Gait: normal; examination of the nails and digits reveal no cyanosis or clubbing. Cervical Exam:  On physical exam, Christa Vega is well-developed, well-nourished, oriented to person, place and time. her gait is normal.  On evaluation of hercervical spine, She has full range of motion of the cervical spine without pain. There is no cervical tenderness to palpation. Shoulder Exam:  On evaluation of her bilaterally upper extremities, her right shoulder has no deformity. There is tenderness upon palpation of the anterior and lateral shoulder. There is not evidence of scapular dyskinesis. There is not muscle atrophy in shoulder girdle. The range of motion for the Right Shoulder is 120/30/BL and for the Left shoulder is 160/45/T8. Right shoulder Motor strength is 5-/5 in the supraspinatus, 5-/5 internal rotation and 5-/5 in external rotation, and Left shoulder motor strength 5/5 in supraspinatus, 5/5 in internal rotation, 5/5 in external rotation.         Right shoulder:  positive Impingement , positive Delgado ,positive  Speeds,negative  Apprehension ,positive Farooq Load Shift, negative Malena manuver, negative Cross arm test.     Left shoulder:  negative Impingement , negative Judah Minks ,negative  Speeds,negative  Apprehension ,negative Farooq Load Shift, negative Malena manuver, negative Cross arm test.     XRAY:    No acute abnormality. MRI:    1. No rotator cuff tear, retraction or atrophy. 2. Mild supraspinatus and infraspinatus tendinopathy. 3. Long head biceps tendon is intact. 4. Mild glenohumeral cartilage thinning.  Small glenohumeral joint effusion. Mild thickening of the inferior glenohumeral ligament, consider adhesive   capsulitis. 5. Motion degrades the quality of the exam.   6. Mild AC degenerative change. Radiographic findings reviewed with patient    Impression:   Encounter Diagnoses   Name Primary?  Arthritis of right acromioclavicular joint Yes    Tendinitis of right rotator cuff        Plan: Natural history and expected course discussed. Questions answered. Educational material distributed. I discussed the treatment options with the patient. She does not have a rotator cuff tear and she is not a surgical candidate. I would perform an injection but she has an allergy. I will order Celebrex 200 mg BID and see her back in one month.

## 2021-07-26 DIAGNOSIS — E55.9 VITAMIN D DEFICIENCY: ICD-10-CM

## 2021-07-26 DIAGNOSIS — K80.20 CALCULUS OF GALLBLADDER WITHOUT CHOLECYSTITIS WITHOUT OBSTRUCTION: ICD-10-CM

## 2021-07-26 DIAGNOSIS — K21.9 GASTROESOPHAGEAL REFLUX DISEASE WITHOUT ESOPHAGITIS: ICD-10-CM

## 2021-07-26 DIAGNOSIS — E78.2 MIXED HYPERLIPIDEMIA: ICD-10-CM

## 2021-07-26 RX ORDER — FLUTICASONE FUROATE AND VILANTEROL TRIFENATATE 100; 25 UG/1; UG/1
1 POWDER RESPIRATORY (INHALATION) DAILY
Qty: 1 EACH | Refills: 5 | Status: SHIPPED
Start: 2021-07-26 | End: 2022-01-03 | Stop reason: CLARIF

## 2021-07-26 RX ORDER — ACETAMINOPHEN 160 MG
1 TABLET,DISINTEGRATING ORAL DAILY
Qty: 30 CAPSULE | Refills: 3 | Status: SHIPPED
Start: 2021-07-26 | End: 2022-01-03 | Stop reason: SDUPTHER

## 2021-07-26 RX ORDER — PANTOPRAZOLE SODIUM 40 MG/1
40 TABLET, DELAYED RELEASE ORAL
Qty: 30 TABLET | Refills: 3 | Status: SHIPPED
Start: 2021-07-26 | End: 2022-01-03 | Stop reason: SDUPTHER

## 2021-07-26 RX ORDER — ATORVASTATIN CALCIUM 20 MG/1
20 TABLET, FILM COATED ORAL DAILY
Qty: 30 TABLET | Refills: 3 | Status: SHIPPED
Start: 2021-07-26 | End: 2022-01-03 | Stop reason: SDUPTHER

## 2021-08-02 ENCOUNTER — TELEPHONE (OUTPATIENT)
Dept: FAMILY MEDICINE CLINIC | Age: 56
End: 2021-08-02

## 2021-08-02 NOTE — TELEPHONE ENCOUNTER
----- Message from Elia Chong sent at 8/2/2021 12:55 PM EDT -----  Subject: Message to Provider    QUESTIONS  Information for Provider? John Hernandez called to see if Dr. Jelani Quiñonez could call   in a Vilma Mayenlander for her because she's been congested for about 3-4 days. No   fever, runny nose, cough.   ---------------------------------------------------------------------------  --------------  CALL BACK INFO  What is the best way for the office to contact you? OK to leave message on   voicemail  Preferred Call Back Phone Number? [phone]  ---------------------------------------------------------------------------  --------------  SCRIPT ANSWERS  Relationship to Patient?  Self

## 2022-01-02 ENCOUNTER — APPOINTMENT (OUTPATIENT)
Dept: CT IMAGING | Age: 57
End: 2022-01-02
Payer: MEDICAID

## 2022-01-02 ENCOUNTER — HOSPITAL ENCOUNTER (EMERGENCY)
Age: 57
Discharge: HOME | End: 2022-01-02
Attending: EMERGENCY MEDICINE
Payer: MEDICAID

## 2022-01-02 VITALS
TEMPERATURE: 97.6 F | OXYGEN SATURATION: 97 % | HEART RATE: 96 BPM | BODY MASS INDEX: 29.55 KG/M2 | DIASTOLIC BLOOD PRESSURE: 73 MMHG | SYSTOLIC BLOOD PRESSURE: 135 MMHG | HEIGHT: 68 IN | RESPIRATION RATE: 20 BRPM | WEIGHT: 195 LBS

## 2022-01-02 DIAGNOSIS — R10.9 LEFT FLANK PAIN: Primary | ICD-10-CM

## 2022-01-02 LAB
ALBUMIN SERPL-MCNC: 4.7 G/DL (ref 3.5–5.2)
ALP BLD-CCNC: 98 U/L (ref 35–104)
ALT SERPL-CCNC: 25 U/L (ref 0–32)
AMYLASE: 56 U/L (ref 20–100)
ANION GAP SERPL CALCULATED.3IONS-SCNC: 11 MMOL/L (ref 7–16)
AST SERPL-CCNC: 21 U/L (ref 0–31)
BACTERIA: NORMAL /HPF
BASOPHILS ABSOLUTE: 0.02 E9/L (ref 0–0.2)
BASOPHILS RELATIVE PERCENT: 0.4 % (ref 0–2)
BILIRUB SERPL-MCNC: 0.5 MG/DL (ref 0–1.2)
BILIRUBIN URINE: NEGATIVE
BLOOD, URINE: NEGATIVE
BUN BLDV-MCNC: 8 MG/DL (ref 6–20)
CALCIUM SERPL-MCNC: 9.9 MG/DL (ref 8.6–10.2)
CHLORIDE BLD-SCNC: 105 MMOL/L (ref 98–107)
CLARITY: CLEAR
CO2: 23 MMOL/L (ref 22–29)
COLOR: YELLOW
CREAT SERPL-MCNC: 0.6 MG/DL (ref 0.5–1)
EOSINOPHILS ABSOLUTE: 0.06 E9/L (ref 0.05–0.5)
EOSINOPHILS RELATIVE PERCENT: 1.2 % (ref 0–6)
EPITHELIAL CELLS, UA: NORMAL /HPF
GFR AFRICAN AMERICAN: >60
GFR NON-AFRICAN AMERICAN: >60 ML/MIN/1.73
GLUCOSE BLD-MCNC: 111 MG/DL (ref 74–99)
GLUCOSE URINE: NEGATIVE MG/DL
HCG(URINE) PREGNANCY TEST: NEGATIVE
HCT VFR BLD CALC: 46.7 % (ref 34–48)
HEMOGLOBIN: 15.7 G/DL (ref 11.5–15.5)
IMMATURE GRANULOCYTES #: 0.02 E9/L
IMMATURE GRANULOCYTES %: 0.4 % (ref 0–5)
KETONES, URINE: NEGATIVE MG/DL
LACTIC ACID: 1.3 MMOL/L (ref 0.5–2.2)
LEUKOCYTE ESTERASE, URINE: NEGATIVE
LIPASE: 19 U/L (ref 13–60)
LYMPHOCYTES ABSOLUTE: 1.73 E9/L (ref 1.5–4)
LYMPHOCYTES RELATIVE PERCENT: 33.6 % (ref 20–42)
MCH RBC QN AUTO: 31.7 PG (ref 26–35)
MCHC RBC AUTO-ENTMCNC: 33.6 % (ref 32–34.5)
MCV RBC AUTO: 94.2 FL (ref 80–99.9)
MONOCYTES ABSOLUTE: 0.37 E9/L (ref 0.1–0.95)
MONOCYTES RELATIVE PERCENT: 7.2 % (ref 2–12)
NEUTROPHILS ABSOLUTE: 2.95 E9/L (ref 1.8–7.3)
NEUTROPHILS RELATIVE PERCENT: 57.2 % (ref 43–80)
NITRITE, URINE: NEGATIVE
PDW BLD-RTO: 12.5 FL (ref 11.5–15)
PH UA: 5.5 (ref 5–9)
PLATELET # BLD: 204 E9/L (ref 130–450)
PMV BLD AUTO: 10.8 FL (ref 7–12)
POTASSIUM SERPL-SCNC: 4.4 MMOL/L (ref 3.5–5)
PROTEIN UA: NEGATIVE MG/DL
RBC # BLD: 4.96 E12/L (ref 3.5–5.5)
RBC UA: NORMAL /HPF (ref 0–2)
SODIUM BLD-SCNC: 139 MMOL/L (ref 132–146)
SPECIFIC GRAVITY UA: 1.02 (ref 1–1.03)
TOTAL PROTEIN: 7.6 G/DL (ref 6.4–8.3)
UROBILINOGEN, URINE: 0.2 E.U./DL
WBC # BLD: 5.2 E9/L (ref 4.5–11.5)
WBC UA: NORMAL /HPF (ref 0–5)

## 2022-01-02 PROCEDURE — 85025 COMPLETE CBC W/AUTO DIFF WBC: CPT

## 2022-01-02 PROCEDURE — 80053 COMPREHEN METABOLIC PANEL: CPT

## 2022-01-02 PROCEDURE — 81025 URINE PREGNANCY TEST: CPT

## 2022-01-02 PROCEDURE — 81001 URINALYSIS AUTO W/SCOPE: CPT

## 2022-01-02 PROCEDURE — 82150 ASSAY OF AMYLASE: CPT

## 2022-01-02 PROCEDURE — 6360000002 HC RX W HCPCS: Performed by: EMERGENCY MEDICINE

## 2022-01-02 PROCEDURE — 93005 ELECTROCARDIOGRAM TRACING: CPT | Performed by: EMERGENCY MEDICINE

## 2022-01-02 PROCEDURE — 99283 EMERGENCY DEPT VISIT LOW MDM: CPT

## 2022-01-02 PROCEDURE — 83690 ASSAY OF LIPASE: CPT

## 2022-01-02 PROCEDURE — 96374 THER/PROPH/DIAG INJ IV PUSH: CPT

## 2022-01-02 PROCEDURE — 36415 COLL VENOUS BLD VENIPUNCTURE: CPT

## 2022-01-02 PROCEDURE — 2580000003 HC RX 258: Performed by: EMERGENCY MEDICINE

## 2022-01-02 PROCEDURE — 83605 ASSAY OF LACTIC ACID: CPT

## 2022-01-02 PROCEDURE — 74176 CT ABD & PELVIS W/O CONTRAST: CPT

## 2022-01-02 RX ORDER — 0.9 % SODIUM CHLORIDE 0.9 %
1000 INTRAVENOUS SOLUTION INTRAVENOUS ONCE
Status: COMPLETED | OUTPATIENT
Start: 2022-01-02 | End: 2022-01-02

## 2022-01-02 RX ORDER — KETOROLAC TROMETHAMINE 30 MG/ML
30 INJECTION, SOLUTION INTRAMUSCULAR; INTRAVENOUS ONCE
Status: COMPLETED | OUTPATIENT
Start: 2022-01-02 | End: 2022-01-02

## 2022-01-02 RX ADMIN — KETOROLAC TROMETHAMINE 30 MG: 30 INJECTION, SOLUTION INTRAMUSCULAR at 09:46

## 2022-01-02 RX ADMIN — SODIUM CHLORIDE 1000 ML: 9 INJECTION, SOLUTION INTRAVENOUS at 09:46

## 2022-01-02 ASSESSMENT — PAIN SCALES - GENERAL
PAINLEVEL_OUTOF10: 10
PAINLEVEL_OUTOF10: 7

## 2022-01-02 NOTE — ED NOTES
Radiology Procedure Waiver   Name: Brittaney Pryor  : 1965  MRN: [id number]    Date:  22    Time: 10:04 AM EST    Benefits of immediately proceeding with Radiology exam(s) without pre-testing outweigh the risks or are not indicated as specified below and therefore the following is/are being waived:    [x] Pregnancy test   [] Patients LMP on-time and regular. [x] Patient had Tubal Ligation or has other Contraception Device. [] Patient  is Menopausal or Premenarcheal.    [] Patient had Full or Partial Hysterectomy. [] Protocol for Iodine allergy    [] MRI Questionnaire     [] BUN/Creatinine   [] Patient age w/no hx of renal dysfunction. [] Patient on Dialysis. [] Recent Normal Labs.   Electronically signed by Craig Singer DO on 22 at 10:04 AM BRI Singer DO  22 100

## 2022-01-02 NOTE — ED PROVIDER NOTES
HPI:  1/2/22, Time: 9:15 AM BRI Buchanan is a 64 y.o. female presenting to the ED for left flank pain, beginning 3 days ago. The complaint has been persistent, moderate in severity, and worsened by nothing. Had some pain with urination today. Patient denies fever/chills, sore throat, cough, congestion, chest pain, shortness of breath, edema, headache, visual disturbances, focal paresthesias, focal weakness, nausea, vomiting, diarrhea, constipation, hematuria, trauma, neck or back pain, rash or other complaints. Has hisotyr of diverticulitis and kidney stone. ROS:   A complete review of systems was performed and all pertinent positives and negatives are stated within HPI, all other systems reviewed and are negative.      --------------------------------------------- PAST HISTORY ---------------------------------------------  Past Medical History:  has a past medical history of At high risk for deep venous thrombosis, Cancer (HCC), COPD (chronic obstructive pulmonary disease) ([facility] [address] DJD (degenerative joint disease) of knee, GERD (gastroesophageal reflux disease), H/O cardiovascular stress test, Headache, Hiatal hernia, Mixed hyperlipidemia, and SVT (supraventricular tachycardia) ([facility] [address] Past Surgical History:  has a past surgical history that includes back surgery; knee surgery (Left); Tonsillectomy; Tubal ligation; Endometrial ablation; Knee arthroscopy (Right, 05/05/2017); Endometrial ablation; bone biopsy; Endoscopy, colon, diagnostic; Cholecystectomy, laparoscopic (N/A, 3/6/2020); Upper gastrointestinal endoscopy (N/A, 3/6/2020); and ablation of dysrhythmic focus (10/06/2020). Social History:  reports that she has been smoking cigarettes. She has a 30.00 pack-year smoking history. She has never used smokeless tobacco. She reports current alcohol use. She reports that she does not use drugs. Family History: family history includes High Blood Pressure in her father and mother. The patients home medications have been reviewed. Allergies: Cortisone, Medrol [methylprednisolone], Oxycontin [oxycodone hcl], and Penicillins        ----------------------------------------PHYSICAL EXAM--------------------------------------  Constitutional:  Well developed, well nourished, no acute distress, non-toxic appearance   Eyes:  PERRL, conjunctiva normal, EOMI  HENT:  Atraumatic, external ears normal, nose normal, oropharynx moist. Neck- normal range of motion, no nuchal rigidity   Respiratory:  No respiratory distress, normal breath sounds, no rales, no wheezing   Cardiovascular:  Normal rate, normal rhythm, no murmurs, no gallops, no rubs. Radial and DP pulses 2+ bilaterally. Compartments are soft. She is warm and well perfused. Cap refill less than 3 seconds. GI:  Soft, nondistended, normal bowel sounds, LLQ tender, no organomegaly, no mass, no rebound, no guarding   :  Left costovertebral angle tenderness   Musculoskeletal:  No edema, no tenderness, no deformities. Back- no tenderness  Integument:  Well hydrated, no visible rash. Adequate perfusion. Lymphatic:  No cervical lymphadenopathy noted   Neurologic:  Alert & oriented x 3, CN 2-12 normal,  no focal deficits noted. Normal gait. Psychiatric:  Speech and behavior appropriate       -------------------------------------------------- RESULTS -------------------------------------------------  I have personally reviewed all laboratory and imaging results for this patient. Results are listed below.      LABS:  Results for orders placed or performed during the hospital encounter of 01/02/22   CBC auto differential   Result Value Ref Range    WBC 5.2 4.5 - 11.5 E9/L    RBC 4.96 3.50 - 5.50 E12/L    Hemoglobin 15.7 (H) 11.5 - 15.5 g/dL    Hematocrit 46.7 34.0 - 48.0 %    MCV 94.2 80.0 - 99.9 fL    MCH 31.7 26.0 - 35.0 pg    MCHC 33.6 32.0 - 34.5 %    RDW 12.5 11.5 - 15.0 fL    Platelets 653 226 - 203 E9/L    MPV 10.8 7.0 - 12.0 fL Neutrophils % 57.2 43.0 - 80.0 %    Immature Granulocytes % 0.4 0.0 - 5.0 %    Lymphocytes % 33.6 20.0 - 42.0 %    Monocytes % 7.2 2.0 - 12.0 %    Eosinophils % 1.2 0.0 - 6.0 %    Basophils % 0.4 0.0 - 2.0 %    Neutrophils Absolute 2.95 1.80 - 7.30 E9/L    Immature Granulocytes # 0.02 E9/L    Lymphocytes Absolute 1.73 1.50 - 4.00 E9/L    Monocytes Absolute 0.37 0.10 - 0.95 E9/L    Eosinophils Absolute 0.06 0.05 - 0.50 E9/L    Basophils Absolute 0.02 0.00 - 0.20 E9/L   Comprehensive metabolic panel   Result Value Ref Range    Sodium 139 132 - 146 mmol/L    Potassium 4.4 3.5 - 5.0 mmol/L    Chloride 105 98 - 107 mmol/L    CO2 23 22 - 29 mmol/L    Anion Gap 11 7 - 16 mmol/L    Glucose 111 (H) 74 - 99 mg/dL    BUN 8 6 - 20 mg/dL    CREATININE 0.6 0.5 - 1.0 mg/dL    GFR Non-African American >60 >=60 mL/min/1.73    GFR African American >60     Calcium 9.9 8.6 - 10.2 mg/dL    Total Protein 7.6 6.4 - 8.3 g/dL    Albumin 4.7 3.5 - 5.2 g/dL    Total Bilirubin 0.5 0.0 - 1.2 mg/dL    Alkaline Phosphatase 98 35 - 104 U/L    ALT 25 0 - 32 U/L    AST 21 0 - 31 U/L   Amylase   Result Value Ref Range    Amylase 56 20 - 100 U/L   Lipase   Result Value Ref Range    Lipase 19 13 - 60 U/L   Lactic acid, plasma   Result Value Ref Range    Lactic Acid 1.3 0.5 - 2.2 mmol/L   Pregnancy, urine   Result Value Ref Range    HCG(Urine) Pregnancy Test NEGATIVE NEGATIVE   Urinalysis with Microscopic   Result Value Ref Range    Color, UA Yellow Straw/Yellow    Clarity, UA Clear Clear    Glucose, Ur Negative Negative mg/dL    Bilirubin Urine Negative Negative    Ketones, Urine Negative Negative mg/dL    Specific Gravity, UA 1.020 1.005 - 1.030    Blood, Urine Negative Negative    pH, UA 5.5 5.0 - 9.0    Protein, UA Negative Negative mg/dL    Urobilinogen, Urine 0.2 <2.0 E.U./dL    Nitrite, Urine Negative Negative    Leukocyte Esterase, Urine Negative Negative    WBC, UA NONE 0 - 5 /HPF    RBC, UA NONE 0 - 2 /HPF    Epithelial Cells, UA RARE /HPF Bacteria, UA NONE SEEN None Seen /HPF   EKG 12 Lead   Result Value Ref Range    Ventricular Rate 84 BPM    Atrial Rate 84 BPM    P-R Interval 154 ms    QRS Duration 80 ms    Q-T Interval 386 ms    QTc Calculation (Bazett) 456 ms    P Axis 56 degrees    R Axis 8 degrees    T Axis 34 degrees       RADIOLOGY:  Interpreted by Radiologist.  CT ABDOMEN PELVIS WO CONTRAST Additional Contrast? None   Final Result   1. No acute abdominal pelvic abnormalities. No sign of urinary calculi. 2. Incidental diverticulosis, no signs of diverticulitis. 3. Unchanged 9 mm left adrenal adenoma. RECOMMENDATIONS:   Unavailable               EKG Interpretation  Time: 10:01 AM EDT  Rhythm: normal sinus   Rate: 84  Axis: normal  Conduction: normal  ST Segments: no acute change  T Waves: no acute change  Clinical Impression: no acute changes  Comparison to prior EKG: None      ------------------------- NURSING NOTES AND VITALS REVIEWED ---------------------------  The nursing notes within the ED encounter and vital signs as below have been reviewed by myself. /73   Pulse 96   Temp 97.6 °F (36.4 °C) (Temporal)   Resp 20   Ht 5' 8\" (1.727 m)   Wt 195 lb (88.5 kg)   SpO2 97%   BMI 29.65 kg/m²   Oxygen Saturation Interpretation: Normal      The patients available past medical records and past encounters were reviewed. ------------------------------ ED COURSE/MEDICAL DECISION MAKING----------------------  Medications   0.9 % sodium chloride bolus (0 mLs IntraVENous Stopped 1/2/22 9781)   ketorolac (TORADOL) injection 30 mg (30 mg IntraVENous Given 1/2/22 4302)           Procedures:   none      Medical Decision Making:    Neg acute on work-up. Feels better and sleeping. Ready to go home. Upon reevaluation abdomen soft nontender. No tenderness to palpation of the left flank. Patient was explicitly instructed on specific signs and symptoms on which to return to the emergency room for.  Patient was instructed to return to the ER for any new or worsening symptoms. Additional discharge instructions were given verbally. All questions were answered. Patient is comfortable and agreeable with discharge plan. Patient in no acute distress and non-toxic in appearance. This patient's ED course included: re-evaluation prior to disposition, IV medications and a personal history and physicial eaxmination    This patient has remained hemodynamically stable, improved and been closely monitored during their ED course. Re-Evaluations:  Time: 12:09 PM EST   Re-evaluation. Patients symptoms are improving  Repeat physical examination is improved      Consultations:   none    Critical Care: none    I, Nay Biggs, , am the Primary Provider of Record    Counseling: The emergency provider has spoken with the patient and discussed todays results, in addition to providing specific details for the plan of care and counseling regarding the diagnosis and prognosis. Questions are answered at this time and they are agreeable with the plan.    --------------------------- IMPRESSION AND DISPOSITION ---------------------------------    IMPRESSION  1.  Left flank pain        DISPOSITION  Disposition: Discharge to home  Patient condition is stable             Mira Panchal DO  01/02/22 5044

## 2022-01-02 NOTE — ED NOTES
Pt feels she cannot lay back for EKG.  Medicated and pt will let nurse know when she is able to lie down     Juli Church RN  01/02/22 0088

## 2022-01-03 ENCOUNTER — OFFICE VISIT (OUTPATIENT)
Dept: FAMILY MEDICINE CLINIC | Age: 57
End: 2022-01-03
Payer: MEDICAID

## 2022-01-03 ENCOUNTER — TELEPHONE (OUTPATIENT)
Dept: FAMILY MEDICINE CLINIC | Age: 57
End: 2022-01-03

## 2022-01-03 ENCOUNTER — HOSPITAL ENCOUNTER (EMERGENCY)
Age: 57
Discharge: LEFT BEFORE DISCHARGE | End: 2022-01-04

## 2022-01-03 VITALS
OXYGEN SATURATION: 98 % | HEIGHT: 68 IN | BODY MASS INDEX: 29.65 KG/M2 | TEMPERATURE: 96.3 F | RESPIRATION RATE: 16 BRPM | HEART RATE: 90 BPM | SYSTOLIC BLOOD PRESSURE: 118 MMHG | DIASTOLIC BLOOD PRESSURE: 72 MMHG

## 2022-01-03 VITALS
DIASTOLIC BLOOD PRESSURE: 91 MMHG | SYSTOLIC BLOOD PRESSURE: 140 MMHG | HEART RATE: 91 BPM | OXYGEN SATURATION: 99 % | RESPIRATION RATE: 18 BRPM | TEMPERATURE: 97.5 F

## 2022-01-03 DIAGNOSIS — E78.2 MIXED HYPERLIPIDEMIA: ICD-10-CM

## 2022-01-03 DIAGNOSIS — J44.9 CHRONIC OBSTRUCTIVE PULMONARY DISEASE, UNSPECIFIED COPD TYPE (HCC): ICD-10-CM

## 2022-01-03 DIAGNOSIS — K80.20 CALCULUS OF GALLBLADDER WITHOUT CHOLECYSTITIS WITHOUT OBSTRUCTION: ICD-10-CM

## 2022-01-03 DIAGNOSIS — K21.9 GASTROESOPHAGEAL REFLUX DISEASE WITHOUT ESOPHAGITIS: ICD-10-CM

## 2022-01-03 DIAGNOSIS — M54.50 ACUTE LEFT-SIDED LOW BACK PAIN, UNSPECIFIED WHETHER SCIATICA PRESENT: Primary | ICD-10-CM

## 2022-01-03 DIAGNOSIS — E55.9 VITAMIN D DEFICIENCY: ICD-10-CM

## 2022-01-03 LAB
EKG ATRIAL RATE: 84 BPM
EKG P AXIS: 56 DEGREES
EKG P-R INTERVAL: 154 MS
EKG Q-T INTERVAL: 386 MS
EKG QRS DURATION: 80 MS
EKG QTC CALCULATION (BAZETT): 456 MS
EKG R AXIS: 8 DEGREES
EKG T AXIS: 34 DEGREES
EKG VENTRICULAR RATE: 84 BPM

## 2022-01-03 PROCEDURE — 3023F SPIROM DOC REV: CPT | Performed by: NURSE PRACTITIONER

## 2022-01-03 PROCEDURE — G8484 FLU IMMUNIZE NO ADMIN: HCPCS | Performed by: NURSE PRACTITIONER

## 2022-01-03 PROCEDURE — G8427 DOCREV CUR MEDS BY ELIG CLIN: HCPCS | Performed by: NURSE PRACTITIONER

## 2022-01-03 PROCEDURE — 3017F COLORECTAL CA SCREEN DOC REV: CPT | Performed by: NURSE PRACTITIONER

## 2022-01-03 PROCEDURE — 4004F PT TOBACCO SCREEN RCVD TLK: CPT | Performed by: NURSE PRACTITIONER

## 2022-01-03 PROCEDURE — G8417 CALC BMI ABV UP PARAM F/U: HCPCS | Performed by: NURSE PRACTITIONER

## 2022-01-03 PROCEDURE — 99213 OFFICE O/P EST LOW 20 MIN: CPT | Performed by: NURSE PRACTITIONER

## 2022-01-03 RX ORDER — ATORVASTATIN CALCIUM 20 MG/1
20 TABLET, FILM COATED ORAL DAILY
Qty: 30 TABLET | Refills: 3 | Status: SHIPPED
Start: 2022-01-03 | End: 2022-06-01 | Stop reason: SDUPTHER

## 2022-01-03 RX ORDER — FLUTICASONE FUROATE AND VILANTEROL TRIFENATATE 100; 25 UG/1; UG/1
1 POWDER RESPIRATORY (INHALATION) DAILY
Qty: 1 EACH | Refills: 5 | Status: CANCELLED | OUTPATIENT
Start: 2022-01-03

## 2022-01-03 RX ORDER — ACETAMINOPHEN 160 MG
1 TABLET,DISINTEGRATING ORAL DAILY
Qty: 30 CAPSULE | Refills: 3 | Status: SHIPPED
Start: 2022-01-03 | End: 2022-06-01 | Stop reason: SDUPTHER

## 2022-01-03 RX ORDER — PANTOPRAZOLE SODIUM 40 MG/1
40 TABLET, DELAYED RELEASE ORAL
Qty: 30 TABLET | Refills: 3 | Status: SHIPPED
Start: 2022-01-03 | End: 2022-06-01 | Stop reason: SDUPTHER

## 2022-01-03 RX ORDER — BUDESONIDE AND FORMOTEROL FUMARATE DIHYDRATE 160; 4.5 UG/1; UG/1
2 AEROSOL RESPIRATORY (INHALATION) 2 TIMES DAILY
Qty: 1 EACH | Refills: 3 | Status: SHIPPED
Start: 2022-01-03 | End: 2022-09-20

## 2022-01-03 ASSESSMENT — ENCOUNTER SYMPTOMS
NAUSEA: 0
VOMITING: 0
BACK PAIN: 1
CONSTIPATION: 0
COUGH: 0
DIARRHEA: 0
SHORTNESS OF BREATH: 1
WHEEZING: 0
ABDOMINAL PAIN: 1

## 2022-01-03 NOTE — TELEPHONE ENCOUNTER
Pt called in from ED waiting room stating the wait time is too long, pain is unbearable and asking if we can just send something. I advised the providers (both doctor [address]) directed her to ED and we cannot move her through ED quicker. She was advised to stay at ED.

## 2022-01-03 NOTE — PROGRESS NOTES
Tyson River (:  1965) is a 64 y.o. female,Established patient, here for evaluation of the following chief complaint(s):  Back Pain (pain started in low left side Friday but is now having lower back pain. does have flank pain when urinating. Went to ED yesterday, UA normal. Pt states ED thinks pain is muscular, didn't give her anything. Pain level is reported 10/10. Taking ibuprofen and tylenol at home, no relief. ) and Health Maintenance (needs AWV. Mammo ordered last . )         ASSESSMENT/PLAN:  1. Acute left-sided low back pain, unspecified whether sciatica present-  Sent to [facility] ER due to severity of pain    2. Gastroesophageal reflux disease without esophagitis  -     pantoprazole (PROTONIX) 40 MG tablet; Take 1 tablet by mouth every morning (before breakfast), Disp-30 tablet, R-3Normal  The current medical regimen is effective;  continue present plan and medications. 3. Calculus of gallbladder without cholecystitis without obstruction  -     pantoprazole (PROTONIX) 40 MG tablet; Take 1 tablet by mouth every morning (before breakfast), Disp-30 tablet, R-3Normal    4. Mixed hyperlipidemia    -     atorvastatin (LIPITOR) 20 MG tablet; Take 1 tablet by mouth daily, Disp-30 tablet, R-3Normal    5. Vitamin D deficiency  -     Cholecalciferol (VITAMIN D3) 50 MCG (2000) CAPS; Take 1 capsule by mouth daily, Disp-30 capsule, R-3Normal    6. Chronic obstructive pulmonary disease, unspecified COPD type (HCC)  -     budesonide-formoterol (SYMBICORT) 160-4.5 MCG/ACT AERO; Inhale 2 puffs into the lungs 2 times daily, Disp-1 each, R-3Normal  The current medical regimen is effective;  continue present plan and medications. No follow-ups on file. Subjective   SUBJECTIVE/OBJECTIVE:  Complains of pain to left side of low back. Pain started in left inguinal area but is mainly in left low back. [facility] yesterday. . Pain aggravated by movement.   Denies injury      Review of Systems   Constitutional: Negative for activity change, appetite change, chills, diaphoresis, fatigue, fever and unexpected weight change. Respiratory: Positive for shortness of breath. Negative for cough and wheezing. Cardiovascular: Positive for palpitations (intermittent-chronic). Negative for chest pain. Gastrointestinal: Positive for abdominal pain (LLQ). Negative for constipation, diarrhea, nausea and vomiting. Genitourinary: Positive for flank pain. Negative for difficulty urinating, frequency and hematuria. Musculoskeletal: Positive for back pain. Neurological: Positive for light-headedness and headaches. Negative for weakness. Objective   /72   Pulse 90   Temp 96.3 °F (35.7 °C) (Temporal)   Resp 16   Ht 5' 8\" (1.727 m)   SpO2 98%   BMI 29.65 kg/m²    Physical Exam  Constitutional:       General: She is in acute distress. Appearance: She is well-developed. Comments: Tearful and moaning in pain   HENT:      Head: Normocephalic and atraumatic. Neck:      Thyroid: No thyromegaly. Trachea: No tracheal deviation. Cardiovascular:      Rate and Rhythm: Normal rate and regular rhythm. Pulses: Normal pulses. Heart sounds: No murmur heard. Pulmonary:      Effort: Pulmonary effort is normal.      Breath sounds: Normal breath sounds. No wheezing or rales. Chest:      Chest wall: No tenderness. Abdominal:      General: Bowel sounds are normal.      Palpations: Abdomen is soft. Tenderness: There is no abdominal tenderness. Musculoskeletal:      Right lower leg: No edema. Left lower leg: No edema. Comments: Severe pain to light touch left lumbar musculature. ROM severely limited due to pain   Skin:     General: Skin is warm and dry. Neurological:      Mental Status: She is alert and oriented to person, place, and time.    Psychiatric:         Mood and Affect: Mood normal.         Behavior: Behavior normal.         Patient also examined by Dr. Gemini LANDON low    An electronic signature was used to authenticate this note.     --PENNIE Del Castillo - CNP

## 2022-01-03 NOTE — ED NOTES
FIRST PROVIDER CONTACT ASSESSMENT NOTE           Department of Emergency Medicine                 First Provider Note            1/3/22  12:48 PM EST    Date of Encounter: No admission date for patient encounter. Patient Name: Jens Voss  : 1965  MRN: [id number]    Chief Complaint: Flank Pain (left sided flank pain started friday. no known injury. seen at [facility] yesterday for same thing. )      History of Present Illness:   Jens Voss is a 64 y.o. female who presents to the ED for left low back pain. Was seen at [facility] ER yesterday, sent home. Reports ongoing pain. Denies N/V/D. Reports pain radiates into LLQ. Focused Physical Exam:  VS:    ED Triage Vitals [22 1226]   BP Temp Temp Source Pulse Resp SpO2 Height Weight   -- 97.5 °F (36.4 °C) Infrared 94 -- 100 % -- --        Physical Ex: Constitutional: Alert and non-toxic. Medical History:  has a past medical history of At high risk for deep venous thrombosis, Cancer (HCC), COPD (chronic obstructive pulmonary disease) ([address] DJD (degenerative joint disease) of knee, GERD (gastroesophageal reflux disease), H/O cardiovascular stress test, Headache, Hiatal hernia, Mixed hyperlipidemia, and SVT (supraventricular tachycardia) ([address] Surgical History:  has a past surgical history that includes back surgery; knee surgery (Left); Tonsillectomy; Tubal ligation; Endometrial ablation; Knee arthroscopy (Right, 2017); Endometrial ablation; bone biopsy; Endoscopy, colon, diagnostic; Cholecystectomy, laparoscopic (N/A, 3/6/2020); Upper gastrointestinal endoscopy (N/A, 3/6/2020); and ablation of dysrhythmic focus (10/06/2020). Social History:  reports that she has been smoking cigarettes. She has a 30.00 pack-year smoking history. She has never used smokeless tobacco. She reports current alcohol use. She reports that she does not use drugs.   Family History: family history includes High Blood Pressure in her father and mother.     Allergies: Cortisone, Medrol [methylprednisolone], Oxycontin [oxycodone hcl], and Penicillins     Initial Plan of Care: Initiate Treatment-Testing, Proceed toTreatment Area When Bed Available for ED Attending/MLP to Continue Care      ---END OF FIRST PROVIDER CONTACT ASSESSMENT NOTE---  Electronically signed by JENI Awan   DD: 1/3/22       JNEI Awan  01/03/22 1247

## 2022-01-10 ENCOUNTER — TELEPHONE (OUTPATIENT)
Dept: FAMILY MEDICINE CLINIC | Age: 57
End: 2022-01-10

## 2022-01-10 NOTE — TELEPHONE ENCOUNTER
Seen in [place] then following day in [id number] both after here. Records reviewed all that are available to me and so far the studies including BW and CT were negative. Unclear of the diagnosis at this time . Would benefit from hospitalization for IV therapy and to establish a diagnosis. There is a crisis in bed availability in [place] so please be patient when you return [facility] we recommend you go.

## 2022-01-10 NOTE — TELEPHONE ENCOUNTER
Julianne Moncada called and said she is still in a lot of pain. She went to ER last week as instructed she waited 5 hours in pain, it was too painful to wait any longer so she left without being seen. She wants to know if you could just order testing for her to have done or does she need to come back into the office?

## 2022-01-11 ENCOUNTER — HOSPITAL ENCOUNTER (EMERGENCY)
Age: 57
Discharge: HOME | End: 2022-01-11
Payer: MEDICAID

## 2022-01-11 ENCOUNTER — APPOINTMENT (OUTPATIENT)
Dept: CT IMAGING | Age: 57
End: 2022-01-11
Payer: MEDICAID

## 2022-01-11 VITALS
SYSTOLIC BLOOD PRESSURE: 155 MMHG | TEMPERATURE: 96.4 F | DIASTOLIC BLOOD PRESSURE: 97 MMHG | RESPIRATION RATE: 16 BRPM | HEART RATE: 120 BPM | OXYGEN SATURATION: 97 %

## 2022-01-11 DIAGNOSIS — M54.50 ACUTE LEFT-SIDED LOW BACK PAIN WITHOUT SCIATICA: Primary | ICD-10-CM

## 2022-01-11 PROCEDURE — 96372 THER/PROPH/DIAG INJ SC/IM: CPT

## 2022-01-11 PROCEDURE — 72131 CT LUMBAR SPINE W/O DYE: CPT

## 2022-01-11 PROCEDURE — 99283 EMERGENCY DEPT VISIT LOW MDM: CPT

## 2022-01-11 PROCEDURE — 6360000002 HC RX W HCPCS: Performed by: PHYSICIAN ASSISTANT

## 2022-01-11 RX ORDER — ORPHENADRINE CITRATE 30 MG/ML
60 INJECTION INTRAMUSCULAR; INTRAVENOUS ONCE
Status: COMPLETED | OUTPATIENT
Start: 2022-01-11 | End: 2022-01-11

## 2022-01-11 RX ORDER — IBUPROFEN 600 MG/1
600 TABLET ORAL EVERY 6 HOURS PRN
Qty: 20 TABLET | Refills: 0 | Status: SHIPPED | OUTPATIENT
Start: 2022-01-11 | End: 2022-06-01 | Stop reason: SDUPTHER

## 2022-01-11 RX ORDER — METHOCARBAMOL 500 MG/1
500 TABLET, FILM COATED ORAL 3 TIMES DAILY
Qty: 15 TABLET | Refills: 0 | Status: SHIPPED | OUTPATIENT
Start: 2022-01-11 | End: 2022-01-16

## 2022-01-11 RX ORDER — KETOROLAC TROMETHAMINE 30 MG/ML
30 INJECTION, SOLUTION INTRAMUSCULAR; INTRAVENOUS ONCE
Status: COMPLETED | OUTPATIENT
Start: 2022-01-11 | End: 2022-01-11

## 2022-01-11 RX ADMIN — KETOROLAC TROMETHAMINE 30 MG: 30 INJECTION, SOLUTION INTRAMUSCULAR; INTRAVENOUS at 12:15

## 2022-01-11 RX ADMIN — ORPHENADRINE CITRATE 60 MG: 60 INJECTION INTRAMUSCULAR; INTRAVENOUS at 12:15

## 2022-01-11 ASSESSMENT — PAIN SCALES - GENERAL: PAINLEVEL_OUTOF10: 8

## 2022-01-11 NOTE — ED PROVIDER NOTES
[address]  Department of Emergency Medicine   ED  Encounter Note  Admit Date/RoomTime: 2022 12:06 PM  ED Room: [place] [address]  NAME: Iona River  : 1965  MRN: [id number]     Chief Complaint:  Back Pain (left lower back pain on side. started new years day. denies n/v/d, reports dark urine) and Abdominal Pain (left lower quad abdominal pain)    HISTORY OF PRESENT ILLNESS        Ajit Duran is a 64 y.o. female who presents to the ED complaint of left-sided low back pain. Patient states she has been in pain since . Left low back. Radiates into her left hip and left groin. Patient did have an ER visit . CT abdomen pelvis was negative for stone, negative for acute process. Lab work within normal limits. UA negative for infection. She was treated with Toradol and discharged home. Patient states she has been laying in bed just and in constant pain to the left low back. Pain will go into her left hip, lateral and anterior. Pain does not radiate down her legs. Denies any numbness or tingling or loss of function down the legs. Patient is not a diabetic. She is not on any blood thinners. She denies fevers or chills. Denies dysuria. Patient denies urine incontinence or retention. Denies saddle anesthesia. Denies bowel incontinence or retention. Patient does have risk factors of previous back surgery. She rates the pain a 10 out of 10. States she is not taking any medication for this complaint, because nobody prescribed her anything. ROS   Pertinent positives and negatives are stated within HPI, all other systems reviewed and are negative.     Past Medical History:  has a past medical history of At high risk for deep venous thrombosis, Cancer ([address] COPD (chronic obstructive pulmonary disease) ([facility] [address] DJD (degenerative joint disease) of knee, GERD (gastroesophageal reflux disease), H/O cardiovascular stress test, Headache, Hiatal hernia, Mixed hyperlipidemia, and SVT (supraventricular tachycardia) ([facility] [address] Surgical History:  has a past surgical history that includes back surgery; knee surgery (Left); Tonsillectomy; Tubal ligation; Endometrial ablation; Knee arthroscopy (Right, 05/05/2017); Endometrial ablation; bone biopsy; Endoscopy, colon, diagnostic; Cholecystectomy, laparoscopic (N/A, 3/6/2020); Upper gastrointestinal endoscopy (N/A, 3/6/2020); and ablation of dysrhythmic focus (10/06/2020). Social History:  reports that she has been smoking cigarettes. She has a 30.00 pack-year smoking history. She has never used smokeless tobacco. She reports current alcohol use. She reports that she does not use drugs. Family History: family history includes High Blood Pressure in her father and mother. Allergies: Cortisone, Medrol [methylprednisolone], Oxycontin [oxycodone hcl], and Penicillins    PHYSICAL EXAM   Oxygen Saturation Interpretation: Normal on room air analysis. ED Triage Vitals [01/11/22 1146]   BP Temp Temp Source Pulse Resp SpO2 Height Weight   -- 96.4 °F (35.8 °C) Temporal -- -- -- -- --       General:  NAD. Alert and Oriented. Well-appearing. Skin:  Warm, dry. No rashes. Head:  Normocephalic. Atraumatic. Eyes:  EOMI. Conjunctiva normal.  ENT:  Oral mucosa moist.  Airway patent. Neck:  Supple. Normal ROM. Respiratory:  No respiratory distress. No labored breathing. Lungs clear without rales, rhonchi or wheezing. Cardiovascular:  Regular rate. No Murmur. No peripheral edema. Extremities warm and good color. Chest:  Abdomen:  Soft, nondistended. Normal bowel sounds. Nontender to palpation all 4 quadrants. Negative rebound, negative guarding. Rectal:  Gu: Bladder nontender and non distended. No CVA tenderness. Pelvic:  Extremities:  Normal ROM. Nontender to palpation. Atraumatic. Back: Painful range of motion just asking patient to sit forward or stand.   Tender to palpation left low back and around the left SI joint and upper buttock on the left side. Straight to positive on the left side with pain to the left low back. Straight leg raise is negative on the right  Good sensation to light touch down both her legs. Neuro:  Alert and Oriented to person, place, time and situation. Normal LOC. Moves all extremities. Speech fluent. Psych:  Calm and Cooperative. Normal thought process. Normal judgement. Lab / Imaging Results   (All laboratory and radiology results have been personally reviewed by myself)  Labs:  No results found for this visit on 01/11/22. Imaging: All Radiology results interpreted by Radiologist unless otherwise noted. CT Lumbar Spine WO Contrast   Final Result   1. No acute fracture or subluxation. 2. Degenerative and postoperative changes in the lumbar spine without   evidence of central canal stenosis or definite neural foraminal narrowing. ED Course / Medical Decision Making     Medications   ketorolac (TORADOL) injection 30 mg (30 mg IntraMUSCular Given 1/11/22 1215)   orphenadrine (NORFLEX) injection 60 mg (60 mg IntraMUSCular Given 1/11/22 1215)        Re-examination:  1/11/22       Time:   Patients condition . Consult(s):   None    Procedure(s):   none    MDM:   CT lumbar spine results discussed with bedside. All her screws are in place. No fracture. Previous work-up negative for kidney Steven. UA negative. Will be discharged home on anti-inflammatories. She is to alternate anti-inflammatory with Tylenol. Call her family doctor for pain management. Plan of Care/Counseling:  Physician Assistant on duty reviewed today's visit with the patient in addition to providing specific details for the plan of care and counseling regarding the diagnosis and prognosis. Questions are answered at this time and are agreeable with the plan. ASSESSMENT     1.  Acute left-sided low back pain without sciatica Stable but not controlled     PLAN Discharged home. Patient condition is good    New Medications     New Prescriptions    IBUPROFEN (IBU) 600 MG TABLET    Take 1 tablet by mouth every 6 hours as needed for Pain    METHOCARBAMOL (ROBAXIN) 500 MG TABLET    Take 1 tablet by mouth 3 times daily for 5 days     Electronically signed by JENI Odom   DD: 1/11/22  **This report was transcribed using voice recognition software. Every effort was made to ensure accuracy; however, inadvertent computerized transcription errors may be present.   END OF ED PROVIDER NOTE       Guerda Odom  01/11/22 6819

## 2022-01-13 ENCOUNTER — CARE COORDINATION (OUTPATIENT)
Dept: CARE COORDINATION | Age: 57
End: 2022-01-13

## 2022-01-13 NOTE — CARE COORDINATION
ACM contacted Arnoldo Tran to offer enrollment in care coordination. Care Coordination services explained to patient. Patient verbalized understanding. Patient declines enrollment.

## 2022-02-23 ENCOUNTER — OFFICE VISIT (OUTPATIENT)
Dept: FAMILY MEDICINE CLINIC | Age: 57
End: 2022-02-23
Payer: MEDICAID

## 2022-02-23 VITALS
TEMPERATURE: 96.6 F | HEIGHT: 68 IN | HEART RATE: 110 BPM | WEIGHT: 192 LBS | DIASTOLIC BLOOD PRESSURE: 69 MMHG | SYSTOLIC BLOOD PRESSURE: 128 MMHG | OXYGEN SATURATION: 98 % | RESPIRATION RATE: 15 BRPM | BODY MASS INDEX: 29.1 KG/M2

## 2022-02-23 DIAGNOSIS — J20.9 BRONCHITIS WITH BRONCHOSPASM: ICD-10-CM

## 2022-02-23 DIAGNOSIS — Z72.0 TOBACCO ABUSE: ICD-10-CM

## 2022-02-23 DIAGNOSIS — M51.36 DDD (DEGENERATIVE DISC DISEASE), LUMBAR: ICD-10-CM

## 2022-02-23 DIAGNOSIS — R05.9 COUGH: Primary | ICD-10-CM

## 2022-02-23 LAB
Lab: NORMAL
PERFORMING INSTRUMENT: NORMAL
QC PASS/FAIL: NORMAL
SARS-COV-2, POC: NORMAL

## 2022-02-23 PROCEDURE — 4004F PT TOBACCO SCREEN RCVD TLK: CPT | Performed by: FAMILY MEDICINE

## 2022-02-23 PROCEDURE — 3017F COLORECTAL CA SCREEN DOC REV: CPT | Performed by: FAMILY MEDICINE

## 2022-02-23 PROCEDURE — G8427 DOCREV CUR MEDS BY ELIG CLIN: HCPCS | Performed by: FAMILY MEDICINE

## 2022-02-23 PROCEDURE — G8417 CALC BMI ABV UP PARAM F/U: HCPCS | Performed by: FAMILY MEDICINE

## 2022-02-23 PROCEDURE — G8484 FLU IMMUNIZE NO ADMIN: HCPCS | Performed by: FAMILY MEDICINE

## 2022-02-23 PROCEDURE — 99213 OFFICE O/P EST LOW 20 MIN: CPT | Performed by: FAMILY MEDICINE

## 2022-02-23 PROCEDURE — 87426 SARSCOV CORONAVIRUS AG IA: CPT | Performed by: FAMILY MEDICINE

## 2022-02-23 RX ORDER — BUPROPION HYDROCHLORIDE 300 MG/1
300 TABLET ORAL EVERY MORNING
Qty: 30 TABLET | Refills: 3 | Status: SHIPPED
Start: 2022-02-23 | End: 2022-05-06

## 2022-02-23 RX ORDER — AZITHROMYCIN 250 MG/1
TABLET, FILM COATED ORAL
Qty: 6 TABLET | Refills: 0 | Status: SHIPPED
Start: 2022-02-23 | End: 2022-03-10 | Stop reason: ALTCHOICE

## 2022-02-23 ASSESSMENT — ENCOUNTER SYMPTOMS
DIARRHEA: 0
NAUSEA: 0
ABDOMINAL PAIN: 0
CONSTIPATION: 0
WHEEZING: 0
VOMITING: 0
SHORTNESS OF BREATH: 0
COUGH: 0
BLOOD IN STOOL: 0

## 2022-02-23 NOTE — PROGRESS NOTES
Rizwan River (:  1965) is a 64 y.o. female,Established patient, here for evaluation of the following chief complaint(s):  Cough (onset a few days ), Sinus Problem, and Side Pain (left sided onset in january )         ASSESSMENT/PLAN:  1. Cough  -     POCT COVID-19, Antigen  2. Tobacco abuse  -     buPROPion (WELLBUTRIN XL) 300 MG extended release tablet; Take 1 tablet by mouth every morning, Disp-30 tablet, R-3Normal  3. DDD (degenerative disc disease), lumbar  -     [facility] [place]  4. Bronchitis with bronchospasm  -     PROAIR  (90 Base) MCG/ACT inhaler; Inhale 2 puffs into the lungs every 6 hours as needed for Wheezing (or Cough spells), Disp-8.5 g, R-3, DAWNormal  -     azithromycin (ZITHROMAX Z-CRISTÓBAL) 250 MG tablet; 2 po on day #1, then 1 po qd for 4 days, Disp-6 tablet, R-0Normal      No follow-ups on file. Subjective   SUBJECTIVE/OBJECTIVE:  Cough (onset a few days ), Sinus Problem, and Side Pain (left sided onset in january )        Review of Systems   Constitutional: Negative for chills, diaphoresis and fever. HENT: Negative for ear discharge, ear pain, hearing loss, nosebleeds and tinnitus. Respiratory: Negative for cough, shortness of breath and wheezing. Cardiovascular: Negative for chest pain. Gastrointestinal: Negative for abdominal pain, blood in stool, constipation, diarrhea, nausea and vomiting. Genitourinary: Negative for dysuria, flank pain and hematuria. Musculoskeletal: Negative for myalgias. Skin: Negative for rash. Neurological: Negative for headaches. Hematological: Does not bruise/bleed easily. Psychiatric/Behavioral: Negative for hallucinations and suicidal ideas.           Objective   /69   Pulse 110   Temp 96.6 °F (35.9 °C) (Temporal)   Resp 15   Ht 5' 8\" (1.727 m)   Wt 192 lb (87.1 kg)   SpO2 98%   BMI 29.19 kg/m²   No results found for: LABA1C  Physical Exam  Constitutional:       General: She is not in acute distress. Appearance: She is well-developed. Eyes:      General: No scleral icterus. Neck:      Thyroid: No thyromegaly. Vascular: No JVD. Trachea: No tracheal deviation. Cardiovascular:      Heart sounds: No gallop. Pulmonary:      Effort: No respiratory distress. Breath sounds: No wheezing. Musculoskeletal:         General: No tenderness. Comments: Pain lumbar   Skin:     General: Skin is warm. Findings: No erythema. Neurological:      Deep Tendon Reflexes: Reflexes normal.            On this date 2/23/2022 I have spent 24 minutes reviewing previous notes, test results and face to face with the patient discussing the diagnosis and importance of compliance with the treatment plan as well as documenting on the day of the visit. An electronic signature was used to authenticate this note.     --Jonatan Pereira DO

## 2022-03-02 ENCOUNTER — OFFICE VISIT (OUTPATIENT)
Dept: PAIN MANAGEMENT | Age: 57
End: 2022-03-02
Payer: MEDICAID

## 2022-03-02 VITALS
OXYGEN SATURATION: 94 % | BODY MASS INDEX: 29.1 KG/M2 | TEMPERATURE: 97.1 F | DIASTOLIC BLOOD PRESSURE: 78 MMHG | RESPIRATION RATE: 16 BRPM | WEIGHT: 192 LBS | SYSTOLIC BLOOD PRESSURE: 115 MMHG | HEIGHT: 68 IN | HEART RATE: 98 BPM

## 2022-03-02 DIAGNOSIS — Z98.1 S/P LUMBAR FUSION: Primary | ICD-10-CM

## 2022-03-02 DIAGNOSIS — G89.29 CHRONIC MYOFASCIAL PAIN: ICD-10-CM

## 2022-03-02 DIAGNOSIS — R10.9 ABDOMINAL PAIN, UNSPECIFIED ABDOMINAL LOCATION: ICD-10-CM

## 2022-03-02 DIAGNOSIS — M51.36 DDD (DEGENERATIVE DISC DISEASE), LUMBAR: ICD-10-CM

## 2022-03-02 DIAGNOSIS — M79.18 CHRONIC MYOFASCIAL PAIN: ICD-10-CM

## 2022-03-02 DIAGNOSIS — M47.817 LUMBOSACRAL SPONDYLOSIS WITHOUT MYELOPATHY: ICD-10-CM

## 2022-03-02 DIAGNOSIS — M53.3 SACROILIAC DYSFUNCTION: ICD-10-CM

## 2022-03-02 PROCEDURE — 4004F PT TOBACCO SCREEN RCVD TLK: CPT | Performed by: ANESTHESIOLOGY

## 2022-03-02 PROCEDURE — 99204 OFFICE O/P NEW MOD 45 MIN: CPT | Performed by: ANESTHESIOLOGY

## 2022-03-02 PROCEDURE — G8484 FLU IMMUNIZE NO ADMIN: HCPCS | Performed by: ANESTHESIOLOGY

## 2022-03-02 PROCEDURE — G8428 CUR MEDS NOT DOCUMENT: HCPCS | Performed by: ANESTHESIOLOGY

## 2022-03-02 PROCEDURE — 3017F COLORECTAL CA SCREEN DOC REV: CPT | Performed by: ANESTHESIOLOGY

## 2022-03-02 PROCEDURE — G8417 CALC BMI ABV UP PARAM F/U: HCPCS | Performed by: ANESTHESIOLOGY

## 2022-03-02 RX ORDER — CYCLOBENZAPRINE HCL 5 MG
5 TABLET ORAL 2 TIMES DAILY PRN
Qty: 30 TABLET | Refills: 1 | Status: SHIPPED | OUTPATIENT
Start: 2022-03-02 | End: 2022-04-01

## 2022-03-02 NOTE — PROGRESS NOTES
[address]        [address] [facility], [address] [facility] [place]      [phone]                  Consult Note      Patient:  Domitila Kebede,  1965    Date of Service:  22     Requesting Physician:  Shirley Guadalupe, *    Reason for Consult:      Patient presents with complaints of low back pain    HISTORY OF PRESENT ILLNESS:      Ms. Domitila Kebede is a 64 y.o. female [facility] of  Chronic low back pain. Prior L4-L5-S1 interbody fusion > 10 yrs ago. Did well until recently. She noticed recent onset left-sided abdominal pain/flank pain and low back pain. Has been evaluated for that she is undergone CT abdomen and CT lumbar spine. Denies any significant lower extremity radiation or numbness tingling or weakness. Pain causes functional limitations/ limits Adl's : Yes     Nursing notes and details of the pain history reviewed. Please see intake notes for details. Previous treatments:   Physical Therapy : yes     Medications: - NSAID's : yes              - Adjuvants or Others : yes,      Lumbar spine Surgeries: yes, L4-5-S1 fusion    She has not been on anticoagulation medications     She has not been on herbal supplements.       H/O Smoking: Yes  H/O alcohol abuse : no  H/O Illicit drug use : no    Employment: disability    Imaging:     CT lumbar spine: 2022:  FINDINGS:   There are bilateral pedicle screws at L4, L5, and S1 with intervening   posterior fusion bars.  There is threaded fusion screw across the L5-S1   intervertebral disc.  There is no evidence of acute fracture.  Vertebral   alignment is anatomic.  There are no compression deformities.  There is right   hemilaminotomy at L4 and left hemilaminotomy at L5.  There is bilateral facet   hypertrophy at L3-4 and L5-S1 as well as left facet hypertrophy at L4-5.  The   paravertebral soft tissue structures are unremarkable. Calli Quezada is no gross   evidence of central canal stenosis or neural foraminal narrowing.  There is a   left adrenal adenoma.  The patient is status post cholecystectomy. Sergey Motto is   arteriosclerosis without abdominal aortic aneurysm.           Impression   1. No acute fracture or subluxation. 2. Degenerative and postoperative changes in the lumbar spine without   evidence of central canal stenosis or definite neural foraminal narrowing. CT abdomen 1/2/2022:  Impression   1.  No acute abdominal pelvic abnormalities.  No sign of urinary calculi. 2. Incidental diverticulosis, no signs of diverticulitis. 3. Unchanged 9 mm left adrenal adenoma.        Past Medical History:   Diagnosis Date    At high risk for deep venous thrombosis     Cancer ([facility] [address]     cervical    COPD (chronic obstructive pulmonary disease) (HCC)     early stage    DJD (degenerative joint disease) of knee 7/18/2014    GERD (gastroesophageal reflux disease)     H/O cardiovascular stress test 05/13/2020    Lexiscan    Headache     Hiatal hernia     Mixed hyperlipidemia 10/25/2016    SVT [facility]supraventricular tachycardia) ([facility])     had episode of SVT 6 months ago and several small episode over last couple months       Past Surgical History:   Procedure Laterality Date    ABLATION OF DYSRHYTHMIC FOCUS  10/06/2020    Succesful Ablation AVN Reentry Tachycardia    (Dr. Wilfredo Cabrera)    BACK SURGERY      lumbar pins & screws    BONE BIOPSY      CHOLECYSTECTOMY, LAPAROSCOPIC N/A 3/6/2020    CHOLECYSTECTOMY LAPAROSCOPIC performed by Alka Garcia MD at [address] [facility], [place], DIAGNOSTIC      KNEE ARTHROSCOPY Right 05/05/2017    KNEE SURGERY Left     arthroscopy    TONSILLECTOMY      TUBAL LIGATION      UPPER GASTROINTESTINAL ENDOSCOPY N/A 3/6/2020    EGD ESOPHAGOGASTRODUODENOSCOPY performed by Alka Garcia MD at [address]       Prior to Admission medications    Medication Sig Start Date End Date Taking? Authorizing Provider   buPROPion (WELLBUTRIN XL) 300 MG extended release tablet Take 1 tablet by mouth every morning 2/23/22  Yes Melody Valenzuela DO   PROAIR  (30 Base) MCG/ACT inhaler Inhale 2 puffs into the lungs every 6 hours as needed for Wheezing (or Cough spells) 2/23/22  Yes Melody Valenzuela DO   azithromycin (ZITHROMAX Z-CRISTÓBAL) 250 MG tablet 2 po on day #1, then 1 po qd for 4 days 2/23/22  Yes Melody Valenzuela DO   pantoprazole (PROTONIX) 40 MG tablet Take 1 tablet by mouth every morning (before breakfast) 1/3/22  Yes Evan Meals, APRN - CNP   atorvastatin (LIPITOR) 20 MG tablet Take 1 tablet by mouth daily 1/3/22  Yes Evan Meals, APRN - CNP   Cholecalciferol (VITAMIN D3) 50 MCG (2000 UT) CAPS Take 1 capsule by mouth daily 1/3/22  Yes Evan Meals, APRN - CNP   budesonide-formoterol [facility]) 160-4.5 MCG/ACT AERO Inhale 2 puffs into the lungs 2 times daily 1/3/22  Yes Evan Meals, APRN - CNP   ibuprofen (IBU) 600 MG tablet Take 1 tablet by mouth every 6 hours as needed for Pain 1/11/22 2/23/22  JENI Singh       Allergies   Allergen Reactions    Cortisone Other (See Comments)     Redness and breaking into a sweat.     Medrol [Methylprednisolone]      Fever; redness; sweat    Oxycontin [Oxycodone Hcl] Itching    Penicillins      Cant remember reaction was from childhood       Social History     Socioeconomic History    Marital status:      Spouse name: Not on file    Number of children: Not on file    Years of education: Not on file    Highest education level: Not on file   Occupational History    Not on file   Tobacco Use    Smoking status: Current Every Day Smoker     Packs/day: 1.00     Years: 30.00     Pack years: 30.00     Types: Cigarettes    Smokeless tobacco: Never Used   Vaping Use    Vaping Use: Never used   Substance and Sexual Activity    Alcohol use: Yes     Comment: rarely    Drug use: No    Sexual activity: Yes   Other Topics Concern    Not on file   Social History Narrative    Not on file     Social Determinants of Health     Financial Resource Strain: Low Risk     Difficulty of Paying Living Expenses: Not very hard   Food Insecurity: No Food Insecurity    Worried About Running Out of Food in the Last Year: Never true    Lizette of Food in the Last Year: Never true   Transportation Needs:     Lack of Transportation (Medical): Not on file    Lack of Transportation (Non-Medical): Not on file   Physical Activity:     Days of Exercise per Week: Not on file    Minutes of Exercise per Session: Not on file   Stress:     Feeling of Stress : Not on file   Social Connections:     Frequency of Communication with Friends and Family: Not on file    Frequency of Social Gatherings with Friends and Family: Not on file    Attends Restorationism Services: Not on file    Active Member of [address] [place] [address] or Organizations: Not on file    Attends Club or Organization Meetings: Not on file    Marital Status: Not on file   Intimate Partner Violence:     Fear of Current or Ex-Partner: Not on file    Emotionally Abused: Not on file    Physically Abused: Not on file    Sexually Abused: Not on file   Housing Stability:     Unable to Pay for Housing in the Last Year: Not on file    Number of [place] in the Last Year: Not on file    Unstable Housing in the Last Year: Not on file       Family History   Problem Relation Age of Onset    High Blood Pressure Mother     High Blood Pressure Father        REVIEW OF SYSTEMS:     Patient specifically denies fever/chills, chest pain, shortness of breath, new bowel or bladder complaints. All other review of systems was negative. Review of Systems - Documented reviewed.     PHYSICAL EXAMINATION:      /78   Pulse 98   Temp 97.1 °F (36.2 °C) (Infrared)   Resp 16   Ht 5' 8\" (1.727 m)   Wt 192 lb (87.1 kg)   SpO2 94%   BMI 29.19 kg/m²     General:      General appearance:  Pleasant and well-hydrated, in no distress and A & O x 3  Build:Overweight  Function: Rises from seated position easily and Moves about room without difficulty    HEENT:    Head:normocephalic, atraumatic  Pupils:regular, round, equal  Sclera: icterus absent    Lungs:    Breathing:normal breathing pattern     CVS:     RRR    Abdomen:    Shape:non-distended and normal  Tenderness:+ on the left side on deep palpation  Guarding:none    Cervical spine:    Inspection:normal  Palpation:tenderness paravertebral muscles, tenderness trapezium, left, right and positive. Range of motion:Normal     Thoracic spine:     Spine inspection:normal   Palpation:No tenderness over the midline and paraspinal area, bilaterally  Range of motion:normal in flexion, extension rotation bilateral and is not painful. Lumbar spine:    Spine inspection: Scar from the prior surgery noted healed well  Palpation: Tenderness paravertebral muscles Yes bilaterally  Range of motion: Decreased, flexion Decreased, Lateral bending, extension and rotation bilaterally reduced is somewhat painful. Sacroiliac joint tenderness Yes left  SLR : negative bilaterally      Musculoskeletal:    Trigger points no    Extremities:    Tremors:None bilaterally upper and lower  Edema:no    Neurological:    Sensory: Normal to light touch     Motor:                   Right Quadriceps 5/5          Left Quadriceps 5/5           Right Gastrocnemius 5/5    Left Gastrocnemius 5/5  Right Ant Tibialis 5/5  Left Ant Tibialis 5/5    Reflexes:    B/l LE equal    Dermatology:    Skin:no rashes or lesions noted    Assessment/Plan:     Diagnosis Orders   1. S/P lumbar fusion  XR LUMBOSACRAL W OBLIQUES AND FLEXION AND EXTENSION   2. DDD (degenerative disc disease), lumbar  XR LUMBOSACRAL W OBLIQUES AND FLEXION AND EXTENSION   3. Chronic myofascial pain     4. Abdominal pain, unspecified abdominal location     5. Lumbosacral spondylosis without myelopathy     6. Sacroiliac dysfunction         64 y.o. female with H/o previous lumbar spine fusion surgery at L4 L5-S1 over 10 years ago. Did well. Has noticed recent onset left-sided abdominal pain/flank pain and low back pain. Denies lower extremity radiation or weakness or numbness. Recent CT lumbar spine reviewed and discussed with the patient. CT abdomen findings reviewed: Incidental diverticulosis, no signs of diverticulitis. Unchanged 9 mm left adrenal adenoma. She has tenderness over the left side of the abdomen. No focal neuro deficits. Most of her complaint seems to be pain over the left side of the abdomen/flank. Recommend:  X- ray of LS spine including Flex/ ext to r.o any instability. Myofacial pain over the low back. Will prescribe compound cream.   Diclofenac, Gabapentin, Baclofen, Lidocaine, Doxepin. Apply 1-2 grams TID over the painful area. Dispense #240 gram with X 2 refills. Use instructions and side effects explained. Recommend GI eval abdominal pain. Short course of Flexeril for prn use. F/U prn. Counseling :Patient encouraged to stay active and to continue Regular home exercise program as tolerated - stretching / strengthening. Smoking cessation counseling : yes     Treatment plan discussed with the patient including medication and procedure side effects. Controlled Substances Monitoring:     [place]ARRS reviewed. Osmin Pedro MD    Dear Dr. Lico Baptiste,   Thank you for referring Ms. Niecy River and allowing us to participate in her care. Please do not hesitate to contact me if you have any questions regarding her care. Nicole Cox MD    CC:    Himanshu Haynes DO  [address],  [address]     NOTE: The above documentation was prepared using voice recognition software. Every attempt was made to ensure accuracy but there may be spelling, grammatical, and contextual errors.

## 2022-03-02 NOTE — PROGRESS NOTES
Patient:  Gael Cash,  1965  Date of Service:  3/2/22      Do you currently have any of the following:    Fever: No  Headache:  Yes  Cough: Yes  Shortness of breath: Yes  Exposed to anyone with these symptoms: No       Patient presents with complaints of left groin lower back left and upper buttocks pain that started 3 months ago and has been getting unchanged. She states the pain began following No specific cause    Pain is constant and is described as stabbing, sharp and burning. She rates the pain as a 5/10 on her worst day , 10/10 on her best day, and a 7/10 on average on the VAS scale. Pain does not radiate to na. She  has numbness of the hands. Alleviating factors include: nothing. Aggravating factors include:  movement, walking, standing, sitting, bending. She states that the pain does keep her from sleeping at night. She took her last dose of Motrin . She is  on NSAIDS and  is not on anticoagulation medications to include none      Previous treatments: medications, has been to ER for the pain and was given Toradol shots. Personal Expectations from this treatment: decrease pain. /78   Pulse 98   Temp 97.1 °F (36.2 °C) (Infrared)   Resp 16   Ht 5' 8\" (1.727 m)   Wt 192 lb (87.1 kg)   SpO2 94%   BMI 29.19 kg/m²     No LMP recorded. Patient has had an ablation.

## 2022-03-10 ENCOUNTER — OFFICE VISIT (OUTPATIENT)
Dept: SURGERY | Age: 57
End: 2022-03-10
Payer: MEDICAID

## 2022-03-10 ENCOUNTER — TELEPHONE (OUTPATIENT)
Dept: SURGERY | Age: 57
End: 2022-03-10

## 2022-03-10 VITALS
HEIGHT: 68 IN | TEMPERATURE: 97.4 F | OXYGEN SATURATION: 99 % | HEART RATE: 88 BPM | BODY MASS INDEX: 29.1 KG/M2 | WEIGHT: 192 LBS | DIASTOLIC BLOOD PRESSURE: 87 MMHG | RESPIRATION RATE: 18 BRPM | SYSTOLIC BLOOD PRESSURE: 134 MMHG

## 2022-03-10 DIAGNOSIS — R10.32 LLQ PAIN: Primary | ICD-10-CM

## 2022-03-10 DIAGNOSIS — R10.32 LLQ PAIN: ICD-10-CM

## 2022-03-10 DIAGNOSIS — K21.00 GASTROESOPHAGEAL REFLUX DISEASE WITH ESOPHAGITIS, UNSPECIFIED WHETHER HEMORRHAGE: ICD-10-CM

## 2022-03-10 LAB
ALBUMIN SERPL-MCNC: 4.5 G/DL (ref 3.5–5.2)
ALP BLD-CCNC: 105 U/L (ref 35–104)
ALT SERPL-CCNC: 23 U/L (ref 0–32)
ANION GAP SERPL CALCULATED.3IONS-SCNC: 10 MMOL/L (ref 7–16)
AST SERPL-CCNC: 18 U/L (ref 0–31)
BILIRUB SERPL-MCNC: 0.3 MG/DL (ref 0–1.2)
BUN BLDV-MCNC: 6 MG/DL (ref 6–20)
CALCIUM SERPL-MCNC: 9.5 MG/DL (ref 8.6–10.2)
CHLORIDE BLD-SCNC: 102 MMOL/L (ref 98–107)
CO2: 27 MMOL/L (ref 22–29)
CREAT SERPL-MCNC: 0.6 MG/DL (ref 0.5–1)
GFR AFRICAN AMERICAN: >60
GFR NON-AFRICAN AMERICAN: >60 ML/MIN/1.73
GLUCOSE BLD-MCNC: 103 MG/DL (ref 74–99)
HCT VFR BLD CALC: 46.2 % (ref 34–48)
HEMOGLOBIN: 15 G/DL (ref 11.5–15.5)
MCH RBC QN AUTO: 31.6 PG (ref 26–35)
MCHC RBC AUTO-ENTMCNC: 32.5 % (ref 32–34.5)
MCV RBC AUTO: 97.3 FL (ref 80–99.9)
PDW BLD-RTO: 12.8 FL (ref 11.5–15)
PLATELET # BLD: 201 E9/L (ref 130–450)
PMV BLD AUTO: 11.8 FL (ref 7–12)
POTASSIUM SERPL-SCNC: 3.8 MMOL/L (ref 3.5–5)
RBC # BLD: 4.75 E12/L (ref 3.5–5.5)
SODIUM BLD-SCNC: 139 MMOL/L (ref 132–146)
TOTAL PROTEIN: 7.4 G/DL (ref 6.4–8.3)
WBC # BLD: 5.3 E9/L (ref 4.5–11.5)

## 2022-03-10 PROCEDURE — 3017F COLORECTAL CA SCREEN DOC REV: CPT | Performed by: SURGERY

## 2022-03-10 PROCEDURE — G8484 FLU IMMUNIZE NO ADMIN: HCPCS | Performed by: SURGERY

## 2022-03-10 PROCEDURE — G8417 CALC BMI ABV UP PARAM F/U: HCPCS | Performed by: SURGERY

## 2022-03-10 PROCEDURE — 4004F PT TOBACCO SCREEN RCVD TLK: CPT | Performed by: SURGERY

## 2022-03-10 PROCEDURE — 99214 OFFICE O/P EST MOD 30 MIN: CPT | Performed by: SURGERY

## 2022-03-10 PROCEDURE — G8427 DOCREV CUR MEDS BY ELIG CLIN: HCPCS | Performed by: SURGERY

## 2022-03-10 RX ORDER — SULFAMETHOXAZOLE AND TRIMETHOPRIM 800; 160 MG/1; MG/1
1 TABLET ORAL 2 TIMES DAILY
Qty: 20 TABLET | Refills: 0 | Status: SHIPPED
Start: 2022-03-10 | End: 2022-03-20 | Stop reason: SINTOL

## 2022-03-10 NOTE — TELEPHONE ENCOUNTER
Per the order of Dr. Helen Melendez, patient referred to [facility] for EGD and Colonoscopy. Patient to follow up back in the office with Dr. Helen Melendez once testing complete. Patient to also have labs drawn. Call placed to inform patient that orders are in the computer and that she is able to go to any [facility] lab and they will be able to draw her labs for her. Patient verbalized understanding.   Electronically signed by Tom Arrieta on 3/10/22 at 11:51 AM EST

## 2022-03-10 NOTE — PROGRESS NOTES
General Surgery History and Physical    Patient's Name/Date of Birth: Bianka Charlton / 1965    Date: March 10, 2022     Surgeon: Scooby French M.D.    PCP: Navdeep Godinez DO     Chief Complaint: LLQ pain    HPI:   Bianka Charlton is a 64 y.o. female who presents for evaluation of LLQ pain for the past several months and had CT in January that showed diverticulosis not itis and normal otherwise.  Timing is intermittent but persistent since january, radiation to LLQ, alleviated by nothing and started months ago and severity is 2-8/10      Past Medical History:   Diagnosis Date    At high risk for deep venous thrombosis     Cancer ([facility] [address]     cervical    COPD (chronic obstructive pulmonary disease) (HCC)     early stage    DJD (degenerative joint disease) of knee 7/18/2014    GERD (gastroesophageal reflux disease)     H/O cardiovascular stress test 05/13/2020    Lexiscan    Headache     Hiatal hernia     Mixed hyperlipidemia 10/25/2016    SVT (supraventricular tachycardia) (HCC)     had episode of SVT 6 months ago and several small episode over last couple months       Past Surgical History:   Procedure Laterality Date    ABLATION OF DYSRHYTHMIC FOCUS  10/06/2020    Succesful Ablation AVN Reentry Tachycardia    (Dr. David Trevino)    BACK SURGERY      lumbar pins & screws    BONE BIOPSY      CHOLECYSTECTOMY, LAPAROSCOPIC N/A 3/6/2020    CHOLECYSTECTOMY LAPAROSCOPIC performed by Winnie Sue MD at [address] [facility], [place], DIAGNOSTIC      KNEE ARTHROSCOPY Right 05/05/2017    KNEE SURGERY Left     arthroscopy    TONSILLECTOMY      TUBAL LIGATION      UPPER GASTROINTESTINAL ENDOSCOPY N/A 3/6/2020    EGD ESOPHAGOGASTRODUODENOSCOPY performed by Winnie Sue MD at [address]       Current Outpatient Medications   Medication Sig Dispense Refill    sulfamethoxazole-trimethoprim (BACTRIM DS;SEPTRA DS) 800-160 MG per tablet Take 1 tablet by mouth 2 times daily for 10 days 20 tablet 0    cyclobenzaprine (FLEXERIL) 5 MG tablet Take 1 tablet by mouth 2 times daily as needed for Muscle spasms 30 tablet 1    buPROPion (WELLBUTRIN XL) 300 MG extended release tablet Take 1 tablet by mouth every morning 30 tablet 3    PROAIR  (90 Base) MCG/ACT inhaler Inhale 2 puffs into the lungs every 6 hours as needed for Wheezing (or Cough spells) 8.5 g 3    ibuprofen (IBU) 600 MG tablet Take 1 tablet by mouth every 6 hours as needed for Pain 20 tablet 0    pantoprazole (PROTONIX) 40 MG tablet Take 1 tablet by mouth every morning (before breakfast) 30 tablet 3    atorvastatin (LIPITOR) 20 MG tablet Take 1 tablet by mouth daily 30 tablet 3    Cholecalciferol (VITAMIN D3) 50 MCG (2000 UT) CAPS Take 1 capsule by mouth daily 30 capsule 3    budesonide-formoterol (SYMBICORT) 160-4.5 MCG/ACT AERO Inhale 2 puffs into the lungs 2 times daily 1 each 3     No current facility-administered medications for this visit. Allergies   Allergen Reactions    Cortisone Other (See Comments)     Redness and breaking into a sweat.  Medrol [Methylprednisolone]      Fever; redness; sweat    Oxycontin [Oxycodone Hcl] Itching    Penicillins      Cant remember reaction was from childhood       The patient has a family history that is negative for severe cardiovascular or respiratory issues, negative for reaction to anesthesia. Social History     Socioeconomic History    Marital status:      Spouse name: Not on file    Number of children: Not on file    Years of education: Not on file    Highest education level: Not on file   Occupational History    Not on file   Tobacco Use    Smoking status: Current Every Day Smoker     Packs/day: 1.00     Years: 30.00     Pack years: 30.00     Types: Cigarettes    Smokeless tobacco: Never Used   Vaping Use    Vaping Use: Never used   Substance and Sexual Activity    Alcohol use: Yes     Comment: rarely    Drug use:  No  Sexual activity: Yes   Other Topics Concern    Not on file   Social History Narrative    Not on file     Social Determinants of Health     Financial Resource Strain: Low Risk     Difficulty of Paying Living Expenses: Not very hard   Food Insecurity: No Food Insecurity    Worried About Running Out of Food in the Last Year: Never true    Lizette of Food in the Last Year: Never true   Transportation Needs:     Lack of Transportation (Medical): Not on file    Lack of Transportation (Non-Medical):  Not on file   Physical Activity:     Days of Exercise per Week: Not on file    Minutes of Exercise per Session: Not on file   Stress:     Feeling of Stress : Not on file   Social Connections:     Frequency of Communication with Friends and Family: Not on file    Frequency of Social Gatherings with Friends and Family: Not on file    Attends Protestant Services: Not on file    Active Member of [address] [place] [address] or Organizations: Not on file    Attends Club or Organization Meetings: Not on file    Marital Status: Not on file   Intimate Partner Violence:     Fear of Current or Ex-Partner: Not on file    Emotionally Abused: Not on file    Physically Abused: Not on file    Sexually Abused: Not on file   Housing Stability:     Unable to Pay for Housing in the Last Year: Not on file    Number of [place] in the Last Year: Not on file    Unstable Housing in the Last Year: Not on file           Review of Systems  Review of Systems -  General ROS: negative for - chills, fatigue or malaise  ENT ROS: negative for - hearing change, nasal congestion or nasal discharge  Allergy and Immunology ROS: negative for - hives, itchy/watery eyes or nasal congestion  Hematological and Lymphatic ROS: negative for - blood clots, blood transfusions, bruising or fatigue  Endocrine ROS: negative for - malaise/lethargy, mood swings, palpitations or polydipsia/polyuria  Respiratory ROS: negative for - sputum changes, stridor, tachypnea or wheezing  Cardiovascular ROS: negative for - irregular heartbeat, loss of consciousness, murmur or orthopnea  Gastrointestinal ROS: negative for - constipation, diarrhea, gas/bloating, heartburn or hematemesis  Genito-Urinary ROS: negative for -  genital discharge, genital ulcers or hematuria  Musculoskeletal ROS: negative for - gait disturbance, muscle pain or muscular weakness    Physical exam:   /87   Pulse 88   Temp 97.4 °F (36.3 °C)   Resp 18   Ht 5' 8\" (1.727 m)   Wt 192 lb (87.1 kg)   SpO2 99%   BMI 29.19 kg/m²   General appearance:  NAD  Head: NCAT, PERRLA, EOMI, red conjunctiva  Neck: supple, no masses  Lungs: CTAB, equal chest rise bilateral  Heart: Reg rate  Abdomen: soft, nontender, nondistended  Skin; no lesions  Gu: no cva tenderness  Extremities: extremities normal, atraumatic, no cyanosis or edema      Radiology:  CT abdomen/pelvis:   1.  No acute abdominal pelvic abnormalities.  No sign of urinary calculi. 2. Incidental diverticulosis, no signs of diverticulitis. 3. Unchanged 9 mm left adrenal adenoma.          Assessment:  64 y.o. female with chronic LLQ pain and hx of diverticulosis    Plan:  will send to Dr. Gann/[facility] group for evaluation for egd/colonoscopy  Will also start course of bactrim due to continued pain and tenderness and get cbc and cmp    Yumiko Moreno MD  9:58 AM  3/10/2022

## 2022-03-19 ENCOUNTER — HOSPITAL ENCOUNTER (EMERGENCY)
Age: 57
Discharge: HOME | End: 2022-03-19
Attending: EMERGENCY MEDICINE
Payer: MEDICAID

## 2022-03-19 VITALS
OXYGEN SATURATION: 97 % | BODY MASS INDEX: 29.1 KG/M2 | HEIGHT: 68 IN | SYSTOLIC BLOOD PRESSURE: 127 MMHG | HEART RATE: 97 BPM | DIASTOLIC BLOOD PRESSURE: 78 MMHG | RESPIRATION RATE: 16 BRPM | WEIGHT: 192 LBS | TEMPERATURE: 97 F

## 2022-03-19 DIAGNOSIS — Z88.9 DRUG ALLERGY: ICD-10-CM

## 2022-03-19 DIAGNOSIS — L50.9 URTICARIA: Primary | ICD-10-CM

## 2022-03-19 PROCEDURE — 6370000000 HC RX 637 (ALT 250 FOR IP): Performed by: EMERGENCY MEDICINE

## 2022-03-19 PROCEDURE — 99284 EMERGENCY DEPT VISIT MOD MDM: CPT

## 2022-03-19 RX ORDER — DIPHENHYDRAMINE HCL 25 MG
50 TABLET ORAL ONCE
Status: COMPLETED | OUTPATIENT
Start: 2022-03-19 | End: 2022-03-19

## 2022-03-19 RX ORDER — CETIRIZINE HYDROCHLORIDE 10 MG/1
10 TABLET ORAL DAILY
Qty: 30 TABLET | Refills: 0 | Status: SHIPPED | OUTPATIENT
Start: 2022-03-19 | End: 2022-03-20 | Stop reason: SDUPTHER

## 2022-03-19 RX ORDER — FAMOTIDINE 20 MG/1
20 TABLET, FILM COATED ORAL ONCE
Status: COMPLETED | OUTPATIENT
Start: 2022-03-19 | End: 2022-03-19

## 2022-03-19 RX ORDER — FAMOTIDINE 20 MG/1
20 TABLET, FILM COATED ORAL 2 TIMES DAILY
Qty: 60 TABLET | Refills: 0 | Status: SHIPPED | OUTPATIENT
Start: 2022-03-19 | End: 2022-03-20 | Stop reason: SDUPTHER

## 2022-03-19 RX ADMIN — FAMOTIDINE 20 MG: 20 TABLET, FILM COATED ORAL at 10:02

## 2022-03-19 RX ADMIN — DIPHENHYDRAMINE HYDROCHLORIDE 50 MG: 25 TABLET ORAL at 10:02

## 2022-03-19 ASSESSMENT — ENCOUNTER SYMPTOMS
ABDOMINAL PAIN: 0
VOMITING: 0
COUGH: 0
COLOR CHANGE: 0
NAUSEA: 0
SHORTNESS OF BREATH: 0
BACK PAIN: 0
RHINORRHEA: 0
BLOOD IN STOOL: 0

## 2022-03-19 NOTE — ED NOTES
[facility]      Gus Hunt RN  03/19/22 [id number]

## 2022-03-19 NOTE — ED PROVIDER NOTES
ED PROVIDER NOTE    Chief Complaint   Patient presents with    Pruritis     Patient states was started on two new meds recently one week ago wellbutrin and bactrim started itching last night and getting hives        HPI:  3/19/22,   Time: 9:52 AM EDT       Bri Roth is a 64 y.o. female presenting to the ED for itching and rash. Gradual onset last night, progressively worsening, moderate in severity. Diffuse urticaria and itching to bilateral arms, face, and trunk. No fever, chills, drainage, dysphagia, drooling, stridor, nausea, vomiting, abdominal pain. Recently started wellbutrin for smoking cessation and bactrim for abdominal pain although she does not know what infection she is being treated for with the bactrim. Ate chinese food last night, no seafood or shellfish, no known food allergies. No new soaps, detergents, clothing, sheets, etc.    Chart review: hx of COPD, GERD, HLD, hiatal hernia, SVT    Review of Systems:     Review of Systems   Constitutional: Negative for appetite change, chills and fever. HENT: Negative for congestion and rhinorrhea. Eyes: Negative for visual disturbance. Respiratory: Negative for cough and shortness of breath. Cardiovascular: Negative for chest pain. Gastrointestinal: Negative for abdominal pain, blood in stool, nausea and vomiting. Genitourinary: Negative for decreased urine volume and difficulty urinating. Musculoskeletal: Negative for back pain and neck pain. Skin: Positive for rash. Negative for color change.    Neurological: Negative for dizziness, syncope, weakness, light-headedness, numbness and headaches.         --------------------------------------------- PAST HISTORY ---------------------------------------------  Past Medical History:   Past Medical History:   Diagnosis Date    At high risk for deep venous thrombosis     Cancer ([facility]     cervical    COPD (chronic obstructive pulmonary disease) ([facility]     early stage    DJD (degenerative joint disease) of knee 7/18/2014    GERD (gastroesophageal reflux disease)     H/O cardiovascular stress test 05/13/2020    Lexiscan    Headache     Hiatal hernia     Mixed hyperlipidemia 10/25/2016    SVT (supraventricular tachycardia) (HCC)     had episode of SVT 6 months ago and several small episode over last couple months       Past Surgical History:   Past Surgical History:   Procedure Laterality Date    ABLATION OF DYSRHYTHMIC FOCUS  10/06/2020    Succesful Ablation AVN Reentry Tachycardia    (Dr. Arnav Byrd)    BACK SURGERY      lumbar pins & screws    BONE BIOPSY      CHOLECYSTECTOMY, LAPAROSCOPIC N/A 3/6/2020    CHOLECYSTECTOMY LAPAROSCOPIC performed by Jannet Alfonso MD at [facility]      ENDOSCOPY, COLON, DIAGNOSTIC      KNEE ARTHROSCOPY Right 05/05/2017    KNEE SURGERY Left     arthroscopy    TONSILLECTOMY      TUBAL LIGATION      UPPER GASTROINTESTINAL ENDOSCOPY N/A 3/6/2020    EGD ESOPHAGOGASTRODUODENOSCOPY performed by Jannet Alfonso MD at [address] [facility] [facility] History:   Social History     Socioeconomic History    Marital status:      Spouse name: None    Number of children: None    Years of education: None    Highest education level: None   Occupational History    None   Tobacco Use    Smoking status: Current Every Day Smoker     Packs/day: 1.00     Years: 30.00     Pack years: 30.00     Types: Cigarettes    Smokeless tobacco: Never Used   Vaping Use    Vaping Use: Never used   Substance and Sexual Activity    Alcohol use: Yes     Comment: rarely    Drug use: No    Sexual activity: Yes   Other Topics Concern    None   Social History Narrative    None     Social Determinants of Health     Financial Resource Strain: Low Risk     Difficulty of Paying Living Expenses: Not very hard   Food Insecurity: No Food Insecurity    Worried About Running Out of Food in the Last Year: Never true    Lizette of Food in the Last Year: Never true   Transportation Needs:     Lack of Transportation (Medical): Not on file    Lack of Transportation (Non-Medical): Not on file   Physical Activity:     Days of Exercise per Week: Not on file    Minutes of Exercise per Session: Not on file   Stress:     Feeling of Stress : Not on file   Social Connections:     Frequency of Communication with Friends and Family: Not on file    Frequency of Social Gatherings with Friends and Family: Not on file    Attends Gnosticism Services: Not on file    Active Member of [address] [place] [address] [employer] or Organizations: Not on file    Attends Club or Organization Meetings: Not on file    Marital Status: Not on file   Intimate Partner Violence:     Fear of Current or Ex-Partner: Not on file    Emotionally Abused: Not on file    Physically Abused: Not on file    Sexually Abused: Not on file   Housing Stability:     Unable to Pay for Housing in the Last Year: Not on file    Number of [place] in the Last Year: Not on file    Unstable Housing in the Last Year: Not on file       Family History:   Family History   Problem Relation Age of Onset    High Blood Pressure Mother     High Blood Pressure Father        The patients home medications have been reviewed. Allergies: Allergies   Allergen Reactions    Cortisone Other (See Comments)     Redness and breaking into a sweat.  Medrol [Methylprednisolone]      Fever; redness; sweat    Oxycontin [Oxycodone Hcl] Itching    Penicillins      Cant remember reaction was from childhood           ---------------------------------------------------PHYSICAL EXAM--------------------------------------    /78   Pulse 97   Temp 97 °F (36.1 °C) (Infrared)   Resp 16   Ht 5' 8\" (1.727 m)   Wt 192 lb (87.1 kg)   SpO2 97%   BMI 29.19 kg/m²     Physical Exam  Constitutional:       General: She is not in acute distress. Appearance: She is not toxic-appearing.    HENT:      Mouth/Throat:      Mouth: Mucous membranes are moist.   Eyes:      General: No scleral icterus. Extraocular Movements: Extraocular movements intact. Pupils: Pupils are equal, round, and reactive to light. Cardiovascular:      Rate and Rhythm: Normal rate and regular rhythm. Pulses: Normal pulses. Heart sounds: Normal heart sounds. No murmur heard. Pulmonary:      Effort: Pulmonary effort is normal. No respiratory distress. Breath sounds: Normal breath sounds. No wheezing or rales. Abdominal:      General: There is no distension. Palpations: Abdomen is soft. Tenderness: There is no abdominal tenderness. Musculoskeletal:         General: No swelling or tenderness. Normal range of motion. Cervical back: Normal range of motion and neck supple. Skin:     General: Skin is warm and dry. Comments: Scattered urticaria to bilateral arms, face, neck, and torso. No warmth/induration/fluctuance/crepitus. No mucosal involvement. Neurological:      Mental Status: She is alert and oriented to person, place, and time. Comments: Strength 5/5 and sensation grossly intact to light touch and equal bilaterally throughout all extremities           ------------------------- NURSING NOTES AND VITALS REVIEWED ---------------------------   The nursing notes within the ED encounter and vital signs as below have been reviewed by myself. /78   Pulse 97   Temp 97 °F (36.1 °C) (Infrared)   Resp 16   Ht 5' 8\" (1.727 m)   Wt 192 lb (87.1 kg)   SpO2 97%   BMI 29.19 kg/m²   Oxygen Saturation Interpretation: Normal    The patients available past medical records and past encounters were reviewed. ------------------------------ ED COURSE/MEDICAL DECISION MAKING----------------------  Medications   diphenhydrAMINE (BENADRYL) tablet 50 mg (has no administration in time range)   famotidine (PEPCID) tablet 20 mg (has no administration in time range)     Counseling:    The emergency provider has spoken with the patient and discussed todays results, in addition to providing specific details for the plan of care and counseling regarding the diagnosis and prognosis. Questions are answered at this time and they are agreeable with the plan. ED Course/Medical Decision Makin y.o. female here with urticaria and itching x 1 day. Non-toxic appearing, afebrile, hemodynamically stable, and in no acute distress. No evidence of impending airway compromise. Breathing comfortably on room air without respiratory distress. On bactrim for unknown source of infection. Recommend stopping bactrim and calling her PCP about wellbutrin. Has hx of allergy to steroids, will treat with antihistamines for now. After discussion of findings and return precautions, patient agrees with plan for discharge and outpatient follow up with PCP, general surgery, and GI.       --------------------------------- IMPRESSION AND DISPOSITION ---------------------------------    IMPRESSION  1. Urticaria    2. Drug allergy        DISPOSITION  Disposition: Discharge to home  Patient condition is good    NOTE: This report was transcribed using voice recognition software.  Every effort was made to ensure accuracy; however, inadvertent computerized transcription errors may be present    Carmencita Severance, MD  Attending Emergency Physician         Carmencita Severance, MD  [phone]

## 2022-03-20 ENCOUNTER — APPOINTMENT (OUTPATIENT)
Dept: CT IMAGING | Age: 57
End: 2022-03-20
Payer: MEDICAID

## 2022-03-20 ENCOUNTER — APPOINTMENT (OUTPATIENT)
Dept: GENERAL RADIOLOGY | Age: 57
End: 2022-03-20
Payer: MEDICAID

## 2022-03-20 ENCOUNTER — HOSPITAL ENCOUNTER (EMERGENCY)
Age: 57
Discharge: HOME | End: 2022-03-20
Attending: EMERGENCY MEDICINE
Payer: MEDICAID

## 2022-03-20 VITALS
RESPIRATION RATE: 18 BRPM | OXYGEN SATURATION: 99 % | DIASTOLIC BLOOD PRESSURE: 74 MMHG | HEART RATE: 79 BPM | TEMPERATURE: 98.3 F | SYSTOLIC BLOOD PRESSURE: 128 MMHG

## 2022-03-20 DIAGNOSIS — L50.9 URTICARIA: ICD-10-CM

## 2022-03-20 DIAGNOSIS — T78.3XXA ANGIOEDEMA, INITIAL ENCOUNTER: ICD-10-CM

## 2022-03-20 DIAGNOSIS — T50.905A ADVERSE EFFECT OF DRUG, INITIAL ENCOUNTER: Primary | ICD-10-CM

## 2022-03-20 LAB
ALBUMIN SERPL-MCNC: 4.3 G/DL (ref 3.5–5.2)
ALP BLD-CCNC: 101 U/L (ref 35–104)
ALT SERPL-CCNC: 20 U/L (ref 0–32)
ANION GAP SERPL CALCULATED.3IONS-SCNC: 12 MMOL/L (ref 7–16)
AST SERPL-CCNC: 16 U/L (ref 0–31)
BASOPHILS ABSOLUTE: 0.01 E9/L (ref 0–0.2)
BASOPHILS RELATIVE PERCENT: 0.1 % (ref 0–2)
BILIRUB SERPL-MCNC: 0.4 MG/DL (ref 0–1.2)
BUN BLDV-MCNC: 7 MG/DL (ref 6–20)
C-REACTIVE PROTEIN: 0.6 MG/DL (ref 0–0.4)
CALCIUM SERPL-MCNC: 9.4 MG/DL (ref 8.6–10.2)
CHLORIDE BLD-SCNC: 104 MMOL/L (ref 98–107)
CO2: 23 MMOL/L (ref 22–29)
CREAT SERPL-MCNC: 0.7 MG/DL (ref 0.5–1)
D DIMER: 829 NG/ML DDU
EOSINOPHILS ABSOLUTE: 0.03 E9/L (ref 0.05–0.5)
EOSINOPHILS RELATIVE PERCENT: 0.4 % (ref 0–6)
GFR AFRICAN AMERICAN: >60
GFR NON-AFRICAN AMERICAN: >60 ML/MIN/1.73
GLUCOSE BLD-MCNC: 117 MG/DL (ref 74–99)
HCT VFR BLD CALC: 45.4 % (ref 34–48)
HEMOGLOBIN: 15.2 G/DL (ref 11.5–15.5)
IMMATURE GRANULOCYTES #: 0.04 E9/L
IMMATURE GRANULOCYTES %: 0.6 % (ref 0–5)
LYMPHOCYTES ABSOLUTE: 1.1 E9/L (ref 1.5–4)
LYMPHOCYTES RELATIVE PERCENT: 15.9 % (ref 20–42)
MAGNESIUM: 1.9 MG/DL (ref 1.6–2.6)
MCH RBC QN AUTO: 31.5 PG (ref 26–35)
MCHC RBC AUTO-ENTMCNC: 33.5 % (ref 32–34.5)
MCV RBC AUTO: 94.2 FL (ref 80–99.9)
MONOCYTES ABSOLUTE: 0.33 E9/L (ref 0.1–0.95)
MONOCYTES RELATIVE PERCENT: 4.8 % (ref 2–12)
NEUTROPHILS ABSOLUTE: 5.39 E9/L (ref 1.8–7.3)
NEUTROPHILS RELATIVE PERCENT: 78.2 % (ref 43–80)
PDW BLD-RTO: 12.9 FL (ref 11.5–15)
PLATELET # BLD: 177 E9/L (ref 130–450)
PMV BLD AUTO: 10.9 FL (ref 7–12)
POTASSIUM SERPL-SCNC: 4.2 MMOL/L (ref 3.5–5)
RBC # BLD: 4.82 E12/L (ref 3.5–5.5)
SEDIMENTATION RATE, ERYTHROCYTE: 11 MM/HR (ref 0–20)
SODIUM BLD-SCNC: 139 MMOL/L (ref 132–146)
TOTAL PROTEIN: 7 G/DL (ref 6.4–8.3)
TROPONIN, HIGH SENSITIVITY: <6 NG/L (ref 0–9)
WBC # BLD: 6.9 E9/L (ref 4.5–11.5)

## 2022-03-20 PROCEDURE — 96375 TX/PRO/DX INJ NEW DRUG ADDON: CPT

## 2022-03-20 PROCEDURE — 96374 THER/PROPH/DIAG INJ IV PUSH: CPT

## 2022-03-20 PROCEDURE — 86140 C-REACTIVE PROTEIN: CPT

## 2022-03-20 PROCEDURE — 85378 FIBRIN DEGRADE SEMIQUANT: CPT

## 2022-03-20 PROCEDURE — 83735 ASSAY OF MAGNESIUM: CPT

## 2022-03-20 PROCEDURE — 85025 COMPLETE CBC W/AUTO DIFF WBC: CPT

## 2022-03-20 PROCEDURE — 36415 COLL VENOUS BLD VENIPUNCTURE: CPT

## 2022-03-20 PROCEDURE — A4216 STERILE WATER/SALINE, 10 ML: HCPCS | Performed by: EMERGENCY MEDICINE

## 2022-03-20 PROCEDURE — 71045 X-RAY EXAM CHEST 1 VIEW: CPT

## 2022-03-20 PROCEDURE — 2580000003 HC RX 258: Performed by: STUDENT IN AN ORGANIZED HEALTH CARE EDUCATION/TRAINING PROGRAM

## 2022-03-20 PROCEDURE — 99284 EMERGENCY DEPT VISIT MOD MDM: CPT

## 2022-03-20 PROCEDURE — 6360000002 HC RX W HCPCS: Performed by: EMERGENCY MEDICINE

## 2022-03-20 PROCEDURE — 2580000003 HC RX 258: Performed by: EMERGENCY MEDICINE

## 2022-03-20 PROCEDURE — 71275 CT ANGIOGRAPHY CHEST: CPT

## 2022-03-20 PROCEDURE — 6360000004 HC RX CONTRAST MEDICATION: Performed by: STUDENT IN AN ORGANIZED HEALTH CARE EDUCATION/TRAINING PROGRAM

## 2022-03-20 PROCEDURE — 93005 ELECTROCARDIOGRAM TRACING: CPT | Performed by: EMERGENCY MEDICINE

## 2022-03-20 PROCEDURE — 2500000003 HC RX 250 WO HCPCS: Performed by: EMERGENCY MEDICINE

## 2022-03-20 PROCEDURE — 80053 COMPREHEN METABOLIC PANEL: CPT

## 2022-03-20 PROCEDURE — 84484 ASSAY OF TROPONIN QUANT: CPT

## 2022-03-20 PROCEDURE — 85651 RBC SED RATE NONAUTOMATED: CPT

## 2022-03-20 RX ORDER — 0.9 % SODIUM CHLORIDE 0.9 %
1000 INTRAVENOUS SOLUTION INTRAVENOUS ONCE
Status: COMPLETED | OUTPATIENT
Start: 2022-03-20 | End: 2022-03-20

## 2022-03-20 RX ORDER — DEXAMETHASONE SODIUM PHOSPHATE 10 MG/ML
8 INJECTION INTRAMUSCULAR; INTRAVENOUS ONCE
Status: COMPLETED | OUTPATIENT
Start: 2022-03-20 | End: 2022-03-20

## 2022-03-20 RX ORDER — FAMOTIDINE 20 MG/1
20 TABLET, FILM COATED ORAL 2 TIMES DAILY
Qty: 60 TABLET | Refills: 0 | Status: SHIPPED | OUTPATIENT
Start: 2022-03-20 | End: 2022-05-06 | Stop reason: CLARIF

## 2022-03-20 RX ORDER — DIPHENHYDRAMINE HYDROCHLORIDE 50 MG/ML
25 INJECTION INTRAMUSCULAR; INTRAVENOUS ONCE
Status: COMPLETED | OUTPATIENT
Start: 2022-03-20 | End: 2022-03-20

## 2022-03-20 RX ORDER — CETIRIZINE HYDROCHLORIDE 10 MG/1
10 TABLET ORAL DAILY
Qty: 30 TABLET | Refills: 0 | Status: SHIPPED | OUTPATIENT
Start: 2022-03-20 | End: 2022-04-19

## 2022-03-20 RX ORDER — PREDNISONE 20 MG/1
40 TABLET ORAL DAILY
Qty: 10 TABLET | Refills: 0 | Status: SHIPPED | OUTPATIENT
Start: 2022-03-20 | End: 2022-03-25

## 2022-03-20 RX ORDER — SODIUM CHLORIDE 0.9 % (FLUSH) 0.9 %
10 SYRINGE (ML) INJECTION PRN
Status: COMPLETED | OUTPATIENT
Start: 2022-03-20 | End: 2022-03-20

## 2022-03-20 RX ADMIN — SODIUM CHLORIDE, PRESERVATIVE FREE 10 ML: 5 INJECTION INTRAVENOUS at 10:17

## 2022-03-20 RX ADMIN — FAMOTIDINE 20 MG: 10 INJECTION INTRAVENOUS at 09:33

## 2022-03-20 RX ADMIN — IOPAMIDOL 75 ML: 755 INJECTION, SOLUTION INTRAVENOUS at 10:17

## 2022-03-20 RX ADMIN — SODIUM CHLORIDE 1000 ML: 9 INJECTION, SOLUTION INTRAVENOUS at 09:27

## 2022-03-20 RX ADMIN — DIPHENHYDRAMINE HYDROCHLORIDE 25 MG: 50 INJECTION, SOLUTION INTRAMUSCULAR; INTRAVENOUS at 09:32

## 2022-03-20 RX ADMIN — DEXAMETHASONE SODIUM PHOSPHATE 8 MG: 10 INJECTION INTRAMUSCULAR; INTRAVENOUS at 09:29

## 2022-03-20 ASSESSMENT — PAIN DESCRIPTION - LOCATION: LOCATION: CHEST

## 2022-03-20 ASSESSMENT — PAIN DESCRIPTION - ORIENTATION: ORIENTATION: MID

## 2022-03-20 ASSESSMENT — ENCOUNTER SYMPTOMS
VOICE CHANGE: 0
VOMITING: 0
SORE THROAT: 0
ABDOMINAL PAIN: 0
BLOOD IN STOOL: 0
RHINORRHEA: 0
SHORTNESS OF BREATH: 0
COUGH: 0
COLOR CHANGE: 0
FACIAL SWELLING: 1
BACK PAIN: 0
NAUSEA: 0
TROUBLE SWALLOWING: 0

## 2022-03-20 ASSESSMENT — PAIN SCALES - GENERAL: PAINLEVEL_OUTOF10: 6

## 2022-03-20 ASSESSMENT — PAIN DESCRIPTION - PAIN TYPE: TYPE: ACUTE PAIN

## 2022-03-20 NOTE — ED PROVIDER NOTES
ED PROVIDER NOTE    Chief Complaint   Patient presents with    Allergic Reaction     pt was given bactrim x1 week and began with rash all over body very itchy, seen here 1 day ago given presciptions but unable to get medications from pharmacy. woke up today with lip edema. HPI:  3/20/22,   Time: 8:35 AM EDT       Hinda Schlatter is a 64 y.o. female presenting to the ED for allergic reaction. Rash gradual onset 2d ago, progressively worsening, moderate in severity. Recently started wellbutrin for smoking cessation and bactrim for abdominal pain although she does not know what infection she is being treated for with the bactrim. Seen yesterday in ED for same issue and advised to discontinue bactrim, discharged home on cetirizine and pepcid. After leaving the ED she went home and slept and when she woke up the pharmacy was closed so she was unable to fill the prescriptions. Throughout the night she developed worsening rash and pruritis, lower lip swelling, and chest pain. No tongue or throat swelling. No dysphagia, drooling, stridor. Chest pain is midsternal, intermittently radiates bilaterally, worse w/ inspiration,   Last dose of bactrim or wellbutrin was 2-3 days ago. No associated shortness of breath, nausea, vomiting, diaphoresis. Ate chinese food two nights ago, no seafood or shellfish, no known food allergies. No new soaps, detergents, clothing, sheets. Patient has allergies documented for cortisone and methylprednisolone. Both caused some diaphoresis and flushing. She is agreeable to trying steroids at this time. Chart review: hx of COPD, GERD, HLD, hiatal hernia, SVT    Review of Systems:     Review of Systems   Constitutional: Negative for appetite change, chills and fever. HENT: Positive for facial swelling. Negative for congestion, drooling, rhinorrhea, sore throat, trouble swallowing and voice change. Eyes: Negative for visual disturbance.    Respiratory: Negative for cough and shortness of breath. Cardiovascular: Positive for chest pain. Gastrointestinal: Negative for abdominal pain, blood in stool, nausea and vomiting. Genitourinary: Negative for decreased urine volume and difficulty urinating. Musculoskeletal: Negative for back pain and neck pain. Skin: Positive for rash. Negative for color change.    Neurological: Negative for dizziness, syncope, weakness, light-headedness, numbness and headaches.         --------------------------------------------- PAST HISTORY ---------------------------------------------  Past Medical History:   Past Medical History:   Diagnosis Date    At high risk for deep venous thrombosis     Cancer ([facility]     cervical    COPD (chronic obstructive pulmonary disease) (HCC)     early stage    DJD (degenerative joint disease) of knee 7/18/2014    GERD (gastroesophageal reflux disease)     H/O cardiovascular stress test 05/13/2020    Lexiscan    Headache     Hiatal hernia     Mixed hyperlipidemia 10/25/2016    SVT (supraventricular tachycardia) (HCC)     had episode of SVT 6 months ago and several small episode over last couple months       Past Surgical History:   Past Surgical History:   Procedure Laterality Date    ABLATION OF DYSRHYTHMIC FOCUS  10/06/2020    Succesful Ablation AVN Reentry Tachycardia    (Dr. Arnav Byrd)    BACK SURGERY      lumbar pins & screws    BONE BIOPSY      CHOLECYSTECTOMY, LAPAROSCOPIC N/A 3/6/2020    CHOLECYSTECTOMY LAPAROSCOPIC performed by Aby Rice MD at [address] [facility] [place], DIAGNOSTIC      KNEE ARTHROSCOPY Right 05/05/2017    KNEE SURGERY Left     arthroscopy    TONSILLECTOMY      TUBAL LIGATION      UPPER GASTROINTESTINAL ENDOSCOPY N/A 3/6/2020    EGD ESOPHAGOGASTRODUODENOSCOPY performed by Aby Rice MD at [address] History:   Social History     Socioeconomic History    Marital status:      Spouse name: Not on file    Number of children: Not on file    Years of education: Not on file    Highest education level: Not on file   Occupational History    Not on file   Tobacco Use    Smoking status: Current Every Day Smoker     Packs/day: 1.00     Years: 30.00     Pack years: 30.00     Types: Cigarettes    Smokeless tobacco: Never Used   Vaping Use    Vaping Use: Never used   Substance and Sexual Activity    Alcohol use: Yes     Comment: rarely    Drug use: No    Sexual activity: Yes   Other Topics Concern    Not on file   Social History Narrative    Not on file     Social Determinants of Health     Financial Resource Strain: Low Risk     Difficulty of Paying Living Expenses: Not very hard   Food Insecurity: No Food Insecurity    Worried About Running Out of Food in the Last Year: Never true    Lizette of Food in the Last Year: Never true   Transportation Needs:     Lack of Transportation (Medical): Not on file    Lack of Transportation (Non-Medical):  Not on file   Physical Activity:     Days of Exercise per Week: Not on file    Minutes of Exercise per Session: Not on file   Stress:     Feeling of Stress : Not on file   Social Connections:     Frequency of Communication with Friends and Family: Not on file    Frequency of Social Gatherings with Friends and Family: Not on file    Attends Adventist Services: Not on file    Active Member of [address] [place] [address] [employer] or Organizations: Not on file    Attends Club or Organization Meetings: Not on file    Marital Status: Not on file   Intimate Partner Violence:     Fear of Current or Ex-Partner: Not on file    Emotionally Abused: Not on file    Physically Abused: Not on file    Sexually Abused: Not on file   Housing Stability:     Unable to Pay for Housing in the Last Year: Not on file    Number of [place] in the Last Year: Not on file    Unstable Housing in the Last Year: Not on file       Family History:   Family History   Problem Relation Age of Onset    High Blood Pressure Mother     High Blood Pressure Father        The patients home medications have been reviewed. Allergies: Allergies   Allergen Reactions    Cortisone Other (See Comments)     Redness and breaking into a sweat.  Medrol [Methylprednisolone]      Fever; redness; sweat    Oxycontin [Oxycodone Hcl] Itching    Penicillins      Cant remember reaction was from childhood           ---------------------------------------------------PHYSICAL EXAM--------------------------------------    There were no vitals taken for this visit. Physical Exam  Vitals and nursing note reviewed. Constitutional:       General: She is not in acute distress. Appearance: She is not toxic-appearing. HENT:      Mouth/Throat:      Mouth: Mucous membranes are moist.      Comments: Lower lip edema. No sublingual edema, posterior oropharyngeal edema. No drooling, dysphagia, stridor  Eyes:      General: No scleral icterus. Extraocular Movements: Extraocular movements intact. Pupils: Pupils are equal, round, and reactive to light. Cardiovascular:      Rate and Rhythm: Normal rate and regular rhythm. Pulses: Normal pulses. Heart sounds: Normal heart sounds. No murmur heard. Pulmonary:      Effort: Pulmonary effort is normal. No respiratory distress. Breath sounds: Normal breath sounds. No wheezing or rales. Abdominal:      General: There is no distension. Palpations: Abdomen is soft. Tenderness: There is no abdominal tenderness. Musculoskeletal:         General: No swelling or tenderness. Normal range of motion. Cervical back: Normal range of motion and neck supple. Comments: Radial, DP, and PT pulses 2+ bilaterally. Skin:     General: Skin is warm and dry. Comments: Urticaria scattered on BUE, abdomen, and bilateral thighs. No sloughing, no induration/crepitus/warmth/fluctuance.    Neurological:      Mental Status: She is alert and oriented to person, place, and time. Comments: Strength 5/5 and sensation grossly intact to light touch and equal bilaterally throughout all extremities             -------------------------------------------------- RESULTS -------------------------------------------------  I have personally reviewed all laboratory and imaging results for this patient. Results are listed below. LABS:  Labs Reviewed   CBC WITH AUTO DIFFERENTIAL - Abnormal; Notable for the following components:       Result Value    Lymphocytes % 15.9 (*)     Lymphocytes Absolute 1.10 (*)     Eosinophils Absolute 0.03 (*)     All other components within normal limits   COMPREHENSIVE METABOLIC PANEL - Abnormal; Notable for the following components:    Glucose 117 (*)     All other components within normal limits   MAGNESIUM   TROPONIN   SEDIMENTATION RATE   D-DIMER, QUANTITATIVE   C-REACTIVE PROTEIN       RADIOLOGY:  Interpreted personally and by Radiologist.  CTA PULMONARY W CONTRAST   Preliminary Result   No evidence of pulmonary embolism or acute pulmonary abnormality. Mild   dilatation of the ascending thoracic aorta at 3.6 cm. RECOMMENDATIONS:   Unavailable         XR CHEST PORTABLE   Final Result   No acute process. EKG:  This EKG is signed and interpreted by the EP. Normal sinus rhythm, vent rate 78bpm, normal axis and intervals, no acute injury pattern, no clinically significant change compared w/ prior EKG       ------------------------- NURSING NOTES AND VITALS REVIEWED ---------------------------   The nursing notes within the ED encounter and vital signs as below have been reviewed by myself. /78   Pulse 110   Temp 98.3 °F (36.8 °C)   Resp 19   SpO2 99%   Oxygen Saturation Interpretation: Normal    The patients available past medical records and past encounters were reviewed.         ------------------------------ ED COURSE/MEDICAL DECISION MAKING----------------------  Medications   dexamethasone (DECADRON) injection 8 mg (8 mg IntraVENous Given 3/20/22 0929)   famotidine (PEPCID) 20 mg in sodium chloride (PF) 10 mL injection (20 mg IntraVENous Given 3/20/22 0933)   diphenhydrAMINE (BENADRYL) injection 25 mg (25 mg IntraVENous Given 3/20/22 0932)   0.9 % sodium chloride bolus (0 mLs IntraVENous Stopped 3/20/22 1128)   iopamidol (ISOVUE-370) 76 % injection 75 mL (75 mLs IntraVENous Given 3/20/22 1017)   sodium chloride flush 0.9 % injection 10 mL (10 mLs IntraVENous Given 3/20/22 1017)       Counseling: The emergency provider has spoken with the patient and discussed todays results, in addition to providing specific details for the plan of care and counseling regarding the diagnosis and prognosis. Questions are answered at this time and they are agreeable with the plan. ED Course/Medical Decision Makin y.o. female here with urticaria, chest pain, and lip swelling. Non-toxic appearing, afebrile, hemodynamically stable, and in no acute distress. Breathing comfortably on room air without respiratory distress. No evidence of impending airway compromise. Suspect drug reaction, serum sickness like reaction, in setting of recent bactrim use. Lesions do appear to have some central clearing and is also having some arthralgias and joint swelling in hands. At this time there is no evidence for TEN/SJS or infectious etiology. Treated w/ decadron, benadryl, pepcid, fluids. Observed for 5 hours in the ED and on reevaluation tachycardia resolved and both rash and lip swelling are improving. Has allergies listed to cortisone and medrol but appeared to tolerate decadron in ED without any issue. Will prescribe prednisone. Patient has discontinued bactrim. After discussion of findings and return precautions, patient agrees with plan for discharge and outpatient follow up with PCP and GI.        --------------------------------- IMPRESSION AND DISPOSITION ---------------------------------    IMPRESSION  1.  Adverse effect of drug, initial encounter    2. Urticaria    3. Angioedema, initial encounter        DISPOSITION  Disposition: Discharge to home  Patient condition is good    NOTE: This report was transcribed using voice recognition software.  Every effort was made to ensure accuracy; however, inadvertent computerized transcription errors may be present    Lillie Rust MD  Attending Emergency Physician         Lillie Rust MD  03/20/22 [id number]

## 2022-03-20 NOTE — ED NOTES
Lab work drawn as ordered and sent to lab.cardiac monitor,pulse ox applied to patient     Vijay Cameron RN  03/20/22 2671

## 2022-03-21 LAB
EKG ATRIAL RATE: 78 BPM
EKG P AXIS: 56 DEGREES
EKG P-R INTERVAL: 162 MS
EKG Q-T INTERVAL: 386 MS
EKG QRS DURATION: 78 MS
EKG QTC CALCULATION (BAZETT): 440 MS
EKG R AXIS: 15 DEGREES
EKG T AXIS: 2 DEGREES
EKG VENTRICULAR RATE: 78 BPM

## 2022-03-21 PROCEDURE — 93010 ELECTROCARDIOGRAM REPORT: CPT | Performed by: INTERNAL MEDICINE

## 2022-04-12 ENCOUNTER — HOSPITAL ENCOUNTER (OUTPATIENT)
Age: 57
Discharge: HOME | End: 2022-04-12
Payer: MEDICAID

## 2022-04-12 ENCOUNTER — HOSPITAL ENCOUNTER (EMERGENCY)
Age: 57
Discharge: HOME | End: 2022-04-12
Attending: EMERGENCY MEDICINE
Payer: MEDICAID

## 2022-04-12 ENCOUNTER — HOSPITAL ENCOUNTER (OUTPATIENT)
Dept: ULTRASOUND IMAGING | Age: 57
Discharge: HOME | End: 2022-04-12
Payer: MEDICAID

## 2022-04-12 VITALS
WEIGHT: 192 LBS | BODY MASS INDEX: 29.19 KG/M2 | SYSTOLIC BLOOD PRESSURE: 121 MMHG | RESPIRATION RATE: 16 BRPM | TEMPERATURE: 98.1 F | HEART RATE: 86 BPM | DIASTOLIC BLOOD PRESSURE: 62 MMHG | OXYGEN SATURATION: 98 %

## 2022-04-12 DIAGNOSIS — M79.604 PAIN OF RIGHT LOWER EXTREMITY: Primary | ICD-10-CM

## 2022-04-12 PROCEDURE — 93971 EXTREMITY STUDY: CPT

## 2022-04-12 PROCEDURE — 96372 THER/PROPH/DIAG INJ SC/IM: CPT

## 2022-04-12 PROCEDURE — 99283 EMERGENCY DEPT VISIT LOW MDM: CPT

## 2022-04-12 PROCEDURE — 6360000002 HC RX W HCPCS: Performed by: EMERGENCY MEDICINE

## 2022-04-12 RX ADMIN — ENOXAPARIN SODIUM 90 MG: 100 INJECTION SUBCUTANEOUS at 01:59

## 2022-04-12 ASSESSMENT — ENCOUNTER SYMPTOMS
CHEST TIGHTNESS: 0
ABDOMINAL PAIN: 0
SHORTNESS OF BREATH: 0

## 2022-04-12 ASSESSMENT — PAIN DESCRIPTION - DESCRIPTORS: DESCRIPTORS: ACHING

## 2022-04-12 ASSESSMENT — PAIN SCALES - GENERAL: PAINLEVEL_OUTOF10: 5

## 2022-04-12 ASSESSMENT — PAIN DESCRIPTION - FREQUENCY: FREQUENCY: CONTINUOUS

## 2022-04-12 ASSESSMENT — PAIN DESCRIPTION - LOCATION: LOCATION: LEG

## 2022-04-12 ASSESSMENT — PAIN DESCRIPTION - PAIN TYPE: TYPE: ACUTE PAIN

## 2022-04-12 ASSESSMENT — PAIN DESCRIPTION - ORIENTATION: ORIENTATION: RIGHT

## 2022-04-12 NOTE — ED PROVIDER NOTES
70-year-old female presenting with right leg pain. She has had it for 2 to 3 days and has pain behind the right knee. She was here in the emergency department recently, had elevated D-dimer, CTA did not show any PE at that time. She been discharged home and was doing well. She presents is about 1 to 1:30 in the morning, ultrasounds not available at this time. New problem, persistent, moderate severity, pain to the right knee for 3 days         Family History   Problem Relation Age of Onset    High Blood Pressure Mother     High Blood Pressure Father      Past Surgical History:   Procedure Laterality Date    ABLATION OF DYSRHYTHMIC FOCUS  10/06/2020    Succesful Ablation AVN Reentry Tachycardia    (Dr. Katia Brown)    BACK SURGERY      lumbar pins & screws    BONE BIOPSY      CHOLECYSTECTOMY, LAPAROSCOPIC N/A 3/6/2020    CHOLECYSTECTOMY LAPAROSCOPIC performed by Gareth Gotti MD at [facility]      ENDOSCOPY, COLON, DIAGNOSTIC      KNEE ARTHROSCOPY Right 05/05/2017    KNEE SURGERY Left     arthroscopy    TONSILLECTOMY      TUBAL LIGATION      UPPER GASTROINTESTINAL ENDOSCOPY N/A 3/6/2020    EGD ESOPHAGOGASTRODUODENOSCOPY performed by Gareth Gotti MD at [address] [place] [address] [address]   Constitutional: Negative for fever. Respiratory: Negative for chest tightness and shortness of breath. Cardiovascular: Negative for chest pain. Gastrointestinal: Negative for abdominal pain. Musculoskeletal:        Right leg pain   All other systems reviewed and are negative. Physical Exam  Constitutional:       General: She is not in acute distress. Appearance: She is well-developed. HENT:      Head: Normocephalic and atraumatic. Eyes:      Conjunctiva/sclera: Conjunctivae normal.      Pupils: Pupils are equal, round, and reactive to light. Neck:      Thyroid: No thyromegaly.    Cardiovascular:      Rate and Rhythm: Normal rate and regular rhythm. Pulses: Normal pulses. Comments: Strong distal pulses present, no concern for ischemia, no color changes, no compartment syndrome  Pulmonary:      Effort: Pulmonary effort is normal. No respiratory distress. Breath sounds: Normal breath sounds. Abdominal:      General: There is no distension. Palpations: Abdomen is soft. Tenderness: There is no abdominal tenderness. There is no guarding or rebound. Musculoskeletal:         General: No tenderness. Normal range of motion. Cervical back: Normal range of motion. Skin:     General: Skin is warm and dry. Findings: No erythema. Neurological:      Mental Status: She is alert and oriented to person, place, and time. Cranial Nerves: No cranial nerve deficit. Coordination: Coordination normal.          Procedures     MDM              --------------------------------------------- PAST HISTORY ---------------------------------------------  Past Medical History:  has a past medical history of At high risk for deep venous thrombosis, Cancer (HCC), COPD (chronic obstructive pulmonary disease) ([facility] [address] DJD (degenerative joint disease) of knee, GERD (gastroesophageal reflux disease), H/O cardiovascular stress test, Headache, Hiatal hernia, Mixed hyperlipidemia, and SVT (supraventricular tachycardia) ([facility] [address] Past Surgical History:  has a past surgical history that includes back surgery; knee surgery (Left); Tonsillectomy; Tubal ligation; Endometrial ablation; Knee arthroscopy (Right, 05/05/2017); Endometrial ablation; bone biopsy; Endoscopy, colon, diagnostic; Cholecystectomy, laparoscopic (N/A, 3/6/2020); Upper gastrointestinal endoscopy (N/A, 3/6/2020); and ablation of dysrhythmic focus (10/06/2020). Social History:  reports that she has been smoking cigarettes. She has a 30.00 pack-year smoking history. She has never used smokeless tobacco. She reports current alcohol use.  She reports that she does not use drugs. Family History: family history includes High Blood Pressure in her father and mother. The patients home medications have been reviewed. Allergies: Cortisone, Medrol [methylprednisolone], Bactrim [sulfamethoxazole-trimethoprim], Oxycontin [oxycodone hcl], and Penicillins    -------------------------------------------------- RESULTS -------------------------------------------------  Labs:  No results found for this visit on 04/12/22. Radiology:  No orders to display       ------------------------- NURSING NOTES AND VITALS REVIEWED ---------------------------  Date / Time Roomed:  4/12/2022  1:10 AM  ED Bed Assignment:  HALL/H1    The nursing notes within the ED encounter and vital signs as below have been reviewed. Pulse 99   Temp 98.1 °F (36.7 °C) (Temporal)   SpO2 98%   Oxygen Saturation Interpretation: Normal      ------------------------------------------ PROGRESS NOTES ------------------------------------------  I have spoken with the patient and discussed todays results, in addition to providing specific details for the plan of care and counseling regarding the diagnosis and prognosis. Their questions are answered at this time and they are agreeable with the plan. I discussed at length with them reasons for immediate return here for re evaluation. They will followup with primary care by calling their office tomorrow. Patient presented about 1 to 1:30 in the morning, ultrasound is not available at this time to diagnose a DVT. This is explained to her. She agrees with receiving Lovenox injection, a prescription to get an ultrasound in the next several hours. No shortness of breath, no chest pain, no concern for PE.      --------------------------------- ADDITIONAL PROVIDER NOTES ---------------------------------  At this time the patient is without objective evidence of an acute process requiring hospitalization or inpatient management.   They have remained hemodynamically stable throughout their entire ED visit and are stable for discharge with outpatient follow-up. The plan has been discussed in detail and they are aware of the specific conditions for emergent return, as well as the importance of follow-up. New Prescriptions    No medications on file       Diagnosis:  1. Pain of right lower extremity        Disposition:  Patient's disposition: Discharge to home  Patient's condition is stable.             Teryl Schaumann, DO  04/12/22 0128

## 2022-04-12 NOTE — ED NOTES
Per Dr. Lucila Small instructions, patient is OK to discharge after Lovenox shot. Pt. Instructed to get ultrasound done in the morning.      Caleb Suarez RN  04/12/22 [id number]

## 2022-05-06 ENCOUNTER — OFFICE VISIT (OUTPATIENT)
Dept: FAMILY MEDICINE CLINIC | Age: 57
End: 2022-05-06
Payer: MEDICAID

## 2022-05-06 VITALS
DIASTOLIC BLOOD PRESSURE: 82 MMHG | BODY MASS INDEX: 29.1 KG/M2 | OXYGEN SATURATION: 97 % | HEART RATE: 98 BPM | SYSTOLIC BLOOD PRESSURE: 118 MMHG | RESPIRATION RATE: 16 BRPM | HEIGHT: 68 IN | TEMPERATURE: 97.2 F | WEIGHT: 192 LBS

## 2022-05-06 DIAGNOSIS — J44.9 CHRONIC OBSTRUCTIVE PULMONARY DISEASE, UNSPECIFIED COPD TYPE (HCC): Primary | ICD-10-CM

## 2022-05-06 DIAGNOSIS — Z86.79 HISTORY OF PAROXYSMAL SUPRAVENTRICULAR TACHYCARDIA: ICD-10-CM

## 2022-05-06 DIAGNOSIS — R07.9 CHEST PAIN, UNSPECIFIED TYPE: ICD-10-CM

## 2022-05-06 DIAGNOSIS — R00.2 PALPITATIONS: ICD-10-CM

## 2022-05-06 PROCEDURE — 4004F PT TOBACCO SCREEN RCVD TLK: CPT | Performed by: NURSE PRACTITIONER

## 2022-05-06 PROCEDURE — G8417 CALC BMI ABV UP PARAM F/U: HCPCS | Performed by: NURSE PRACTITIONER

## 2022-05-06 PROCEDURE — G8427 DOCREV CUR MEDS BY ELIG CLIN: HCPCS | Performed by: NURSE PRACTITIONER

## 2022-05-06 PROCEDURE — 99214 OFFICE O/P EST MOD 30 MIN: CPT | Performed by: NURSE PRACTITIONER

## 2022-05-06 PROCEDURE — 3017F COLORECTAL CA SCREEN DOC REV: CPT | Performed by: NURSE PRACTITIONER

## 2022-05-06 PROCEDURE — 3023F SPIROM DOC REV: CPT | Performed by: NURSE PRACTITIONER

## 2022-05-06 ASSESSMENT — ENCOUNTER SYMPTOMS
WHEEZING: 0
DIARRHEA: 1
COUGH: 0
VOMITING: 0
CONSTIPATION: 0
NAUSEA: 0
CHEST TIGHTNESS: 1
SHORTNESS OF BREATH: 1

## 2022-05-06 NOTE — PROGRESS NOTES
Cloyd Alpers (:  1965) is a 64 y.o. female,Established patient, here for evaluation of the following chief complaint(s):  Chest Pain (Seen in ED twice this week for chest pain, SOB. Left AMA. Imaging done. [address] [place] [address] ED, in media. ) and Health Maintenance (Needs an AWV. reminded to complete mammo. )         ASSESSMENT/PLAN:  1. Chronic obstructive pulmonary disease, unspecified COPD type ([address]  -     External Referral To Pulmonology  Encouraged smoking cessation    2. Chest pain, unspecified type  -     Lisbeth Tejeda MD, Cardiology, [place]  Last stress test May 2020    3. History of paroxysmal supraventricular tachycardia  -     Cardiac event monitor; Future  History of ablation for SVT    4. Palpitations  -     Cardiac event monitor; Future    If chest pain returns or SOB becomes worse she is advised to return to ER    No follow-ups on file. Subjective   SUBJECTIVE/OBJECTIVE:  Complains of chest pain. States went to [address] [place] [address] ER twice. States she was admitted the first time but left AMA due to back pain/not moved to bed upstairs. Returned to ER yesterday and was given breathing treatment. Previously saw Dr Zeus Curran and had stress test in May 2020. Does not want to go back to him. Complains of irregular heartbeat, pain across chest, chest heaviness, SOB, weakness. Has GARLAND and increased heart rate. States worse over past month      Review of Systems   Constitutional: Positive for fatigue. Negative for activity change, appetite change and unexpected weight change. Respiratory: Positive for chest tightness and shortness of breath. Negative for cough and wheezing. Cardiovascular: Positive for chest pain and palpitations. Gastrointestinal: Positive for diarrhea. Negative for constipation, nausea and vomiting. Neurological: Positive for light-headedness. Negative for weakness and headaches.           Objective   /82   Pulse 98   Temp 97.2 °F (36.2 °C) (Temporal)   Resp 16   Ht 5' 8\" (1.727 m)   Wt 192 lb (87.1 kg)   SpO2 97%   BMI 29.19 kg/m²    Physical Exam  Constitutional:       General: She is not in acute distress. Appearance: Normal appearance. She is well-developed. HENT:      Head: Normocephalic and atraumatic. Neck:      Thyroid: No thyromegaly. Trachea: No tracheal deviation. Cardiovascular:      Rate and Rhythm: Normal rate and regular rhythm. Pulses: Normal pulses. Heart sounds: Normal heart sounds. No murmur heard. Pulmonary:      Effort: Pulmonary effort is normal. No respiratory distress. Breath sounds: Normal breath sounds. No wheezing, rhonchi or rales. Chest:      Chest wall: No tenderness. Abdominal:      General: Bowel sounds are normal.      Palpations: Abdomen is soft. Tenderness: There is no abdominal tenderness. Musculoskeletal:      Right lower leg: No edema. Left lower leg: No edema. Lymphadenopathy:      Cervical: No cervical adenopathy. Skin:     General: Skin is warm and dry. Neurological:      Mental Status: She is alert and oriented to person, place, and time. Psychiatric:         Mood and Affect: Mood normal.         Behavior: Behavior normal.            MDM moderate      An electronic signature was used to authenticate this note.     --Feliciano Watts, PENNIE - CNP

## 2022-05-09 ENCOUNTER — OFFICE VISIT (OUTPATIENT)
Dept: FAMILY MEDICINE CLINIC | Age: 57
End: 2022-05-09
Payer: MEDICAID

## 2022-05-09 VITALS
TEMPERATURE: 97.2 F | HEART RATE: 88 BPM | HEIGHT: 68 IN | RESPIRATION RATE: 16 BRPM | SYSTOLIC BLOOD PRESSURE: 118 MMHG | WEIGHT: 192 LBS | BODY MASS INDEX: 29.1 KG/M2 | DIASTOLIC BLOOD PRESSURE: 78 MMHG | OXYGEN SATURATION: 96 %

## 2022-05-09 DIAGNOSIS — M54.42 CHRONIC LEFT-SIDED LOW BACK PAIN WITH LEFT-SIDED SCIATICA: Primary | ICD-10-CM

## 2022-05-09 DIAGNOSIS — G89.29 CHRONIC LEFT-SIDED LOW BACK PAIN WITH LEFT-SIDED SCIATICA: Primary | ICD-10-CM

## 2022-05-09 PROCEDURE — G8417 CALC BMI ABV UP PARAM F/U: HCPCS | Performed by: NURSE PRACTITIONER

## 2022-05-09 PROCEDURE — 99213 OFFICE O/P EST LOW 20 MIN: CPT | Performed by: NURSE PRACTITIONER

## 2022-05-09 PROCEDURE — 4004F PT TOBACCO SCREEN RCVD TLK: CPT | Performed by: NURSE PRACTITIONER

## 2022-05-09 PROCEDURE — 3017F COLORECTAL CA SCREEN DOC REV: CPT | Performed by: NURSE PRACTITIONER

## 2022-05-09 PROCEDURE — G8427 DOCREV CUR MEDS BY ELIG CLIN: HCPCS | Performed by: NURSE PRACTITIONER

## 2022-05-09 ASSESSMENT — ENCOUNTER SYMPTOMS
CONSTIPATION: 0
WHEEZING: 0
BACK PAIN: 1
NAUSEA: 0
CHEST TIGHTNESS: 1
DIARRHEA: 1
COUGH: 0
ABDOMINAL PAIN: 1
VOMITING: 0
SHORTNESS OF BREATH: 1

## 2022-05-09 NOTE — PROGRESS NOTES
Brenton River (:  1965) is a 64 y.o. female,Established patient, here for evaluation of the following chief complaint(s):  Back Pain and Advice Only (Wants to discuss things \"I saw on MyChart\". )         ASSESSMENT/PLAN:  1. Chronic left-sided low back pain with left-sided sciatica  -     MRI LUMBAR SPINE W WO CONTRAST; Future  not improving    Imaging reviewed, CTA showing thoracic aorta with some dilation but not aneurysm. CT showing adrenal adenoma and liver cyst/hemangioma    No follow-ups on file. Subjective   SUBJECTIVE/OBJECTIVE:  Complains of low back pain since January. Pain is mainly on left side and radiates across to right side and to LLQ. She has had back surgery (15-20 years ago, lumbar fusion). No recent injury. Has had CT and x-ray and pain management consult. She was referred to GI by pain management. She is on ibuprofen, not able to do PT due to activity intolerance. Feels like there is a lump to left of spine  Patient is concerned about her aorta, liver cyst noted in imaging in MyChart      Review of Systems   Constitutional: Negative for activity change, appetite change, fatigue and unexpected weight change. Respiratory: Positive for chest tightness and shortness of breath (chronic). Negative for cough and wheezing. Cardiovascular: Positive for palpitations. Negative for chest pain. Gastrointestinal: Positive for abdominal pain (LLQ) and diarrhea (intermittent). Negative for constipation, nausea and vomiting. Musculoskeletal: Positive for back pain, gait problem and myalgias. Neurological: Negative for weakness, light-headedness and headaches. Objective   /78   Pulse 88   Temp 97.2 °F (36.2 °C) (Temporal)   Resp 16   Ht 5' 8\" (1.727 m)   Wt 192 lb (87.1 kg)   SpO2 96%   BMI 29.19 kg/m²    Physical Exam  Constitutional:       General: She is not in acute distress. Appearance: Normal appearance. She is well-developed.    HENT:      Head: Normocephalic and atraumatic. Neck:      Thyroid: No thyromegaly. Trachea: No tracheal deviation. Cardiovascular:      Rate and Rhythm: Normal rate and regular rhythm. Heart sounds: No murmur heard. Pulmonary:      Effort: Pulmonary effort is normal.      Breath sounds: Normal breath sounds. No wheezing or rales. Chest:      Chest wall: No tenderness. Abdominal:      General: Bowel sounds are normal.      Palpations: Abdomen is soft. Tenderness: There is no abdominal tenderness. Musculoskeletal:         General: Tenderness present. Comments: Pain to lumbar spine and left lumbar musculature,  ROM limited due to pain. Negative SLR right, positive SLR left. Upper and lower extremities equal and strong. Lymphadenopathy:      Cervical: No cervical adenopathy. Skin:     General: Skin is warm and dry. Neurological:      Mental Status: She is alert and oriented to person, place, and time. Psychiatric:         Mood and Affect: Mood normal.         Behavior: Behavior normal.            [facility]      An electronic signature was used to authenticate this note.     --PENNIE Smalls - CNP

## 2022-05-10 ENCOUNTER — TELEPHONE (OUTPATIENT)
Dept: NON INVASIVE DIAGNOSTICS | Age: 57
End: 2022-05-10

## 2022-05-10 NOTE — TELEPHONE ENCOUNTER
Spoke with patient and verified address, monitor will be mailed to patient. Patient will call with any questions or concerns.

## 2022-05-16 NOTE — PROGRESS NOTES
[facility] Cardiology consult  Dr. Sheryn Duverney      Reason for Consult: Chest Pain  Referring Physician: Madhuri Tadeo DO     CHIEF COMPLAINT:   Chief Complaint   Patient presents with    Chest Pain     Consult per Renata Red       HISTORY OF PRESENT ILLNESS:   64year old female  With history of COPD and SVT is here due to chest pain. Patient has been having chest pain, described as an extra beat followed by a pause, comes and goes, that been going on for the last few weeks, patient was evaluated by Dr. Renata Red and she had Zio patch on, shortness of breath is at baseline, denies any lightheadedness or dizziness, no pedal edema, no PND, no orthopnea, no syncope, no presyncopal episodes.       Past Medical History:   Diagnosis Date    At high risk for deep venous thrombosis     Cancer ([facility] [address]     cervical    COPD (chronic obstructive pulmonary disease) (HCC)     early stage    DJD (degenerative joint disease) of knee 7/18/2014    GERD (gastroesophageal reflux disease)     H/O cardiovascular stress test 05/13/2020    Lexiscan    Headache     Hiatal hernia     Mixed hyperlipidemia 10/25/2016    SVT (supraventricular tachycardia) (HCC)     had episode of SVT 6 months ago and several small episode over last couple months         Past Surgical History:   Procedure Laterality Date    ABLATION OF DYSRHYTHMIC FOCUS  10/06/2020    Succesful Ablation AVN Reentry Tachycardia    (Dr. Timothy Terrazas)    BACK SURGERY      lumbar pins & screws    BONE BIOPSY      CHOLECYSTECTOMY, LAPAROSCOPIC N/A 3/6/2020    CHOLECYSTECTOMY LAPAROSCOPIC performed by Vale Laura MD at [address] [facility] [place], DIAGNOSTIC      KNEE ARTHROSCOPY Right 05/05/2017    KNEE SURGERY Left     arthroscopy    TONSILLECTOMY      TUBAL LIGATION      UPPER GASTROINTESTINAL ENDOSCOPY N/A 3/6/2020    EGD ESOPHAGOGASTRODUODENOSCOPY performed by Vale Laura MD at [address] Current Outpatient Medications   Medication Sig Dispense Refill    PROAIR  (90 Base) MCG/ACT inhaler Inhale 2 puffs into the lungs every 6 hours as needed for Wheezing (or Cough spells) 8.5 g 3    ibuprofen (IBU) 600 MG tablet Take 1 tablet by mouth every 6 hours as needed for Pain 20 tablet 0    pantoprazole (PROTONIX) 40 MG tablet Take 1 tablet by mouth every morning (before breakfast) 30 tablet 3    atorvastatin (LIPITOR) 20 MG tablet Take 1 tablet by mouth daily 30 tablet 3    Cholecalciferol (VITAMIN D3) 50 MCG (2000 UT) CAPS Take 1 capsule by mouth daily 30 capsule 3    budesonide-formoterol (SYMBICORT) 160-4.5 MCG/ACT AERO Inhale 2 puffs into the lungs 2 times daily 1 each 3     No current facility-administered medications for this visit.          Allergies as of 05/17/2022 - Fully Reviewed 05/17/2022   Allergen Reaction Noted    Cortisone Other (See Comments) 11/03/2014    Medrol [methylprednisolone]  02/27/2018    Bactrim [sulfamethoxazole-trimethoprim] Hives and Angioedema 03/20/2022    Oxycontin [oxycodone hcl] Itching 05/03/2014    Penicillins  01/05/2012       Social History     Socioeconomic History    Marital status:      Spouse name: Not on file    Number of children: Not on file    Years of education: Not on file    Highest education level: Not on file   Occupational History    Not on file   Tobacco Use    Smoking status: Current Every Day Smoker     Packs/day: 1.00     Years: 30.00     Pack years: 30.00     Types: Cigarettes    Smokeless tobacco: Never Used   Vaping Use    Vaping Use: Never used   Substance and Sexual Activity    Alcohol use: Yes     Comment: rarely    Drug use: No    Sexual activity: Yes   Other Topics Concern    Not on file   Social History Narrative    Not on file     Social Determinants of Health     Financial Resource Strain: Low Risk     Difficulty of Paying Living Expenses: Not very hard   Food Insecurity: No Food Insecurity    Worried About [facility] in the Last Year: Never true    [place] of Food in the Last Year: Never true   Transportation Needs:     Lack of Transportation (Medical): Not on file    Lack of Transportation (Non-Medical):  Not on file   Physical Activity:     Days of Exercise per Week: Not on file    Minutes of Exercise per Session: Not on file   Stress:     Feeling of Stress : Not on file   Social Connections:     Frequency of Communication with Friends and Family: Not on file    Frequency of Social Gatherings with Friends and Family: Not on file    Attends Anabaptist Services: Not on file    Active Member of [address] [place] [address] or Organizations: Not on file    Attends Club or Organization Meetings: Not on file    Marital Status: Not on file   Intimate Partner Violence:     Fear of Current or Ex-Partner: Not on file    Emotionally Abused: Not on file    Physically Abused: Not on file    Sexually Abused: Not on file   Housing Stability:     Unable to Pay for Housing in the Last Year: Not on file    Number of [place] in the Last Year: Not on file    Unstable Housing in the Last Year: Not on file       Family History   Problem Relation Age of Onset    High Blood Pressure Mother     High Blood Pressure Father        REVIEW OF SYSTEMS:     CONSTITUTIONAL:  negative for  fevers, chills, sweats and fatigue  EYES:  negative for  double vision, blurred vision and blind spots  HEENT:  negative for  tinnitus, earaches, nasal congestion and epistaxis  RESPIRATORY:  negative for  dry cough, cough with sputum, wheezing and hemoptysis  CARDIOVASCULAR: as per HPI  GASTROINTESTINAL:  negative for nausea, vomiting, diarrhea, constipation, pruritus and jaundice  GENITOURINARY:  negative for frequency, dysuria, nocturia, urinary incontinence and hesitancy  HEMATOLOGIC/LYMPHATIC:  negative for easy bruising, bleeding, lymphadenopathy and petechiae  ALLERGIC/IMMUNOLOGIC:  negative for urticaria, hay fever and angioedema  ENDOCRINE:  negative for heat intolerance, cold intolerance, tremor, hair loss and diabetic symptoms including neither polyuria nor polydipsia nor blurred vision  MUSCULOSKELETAL:  negative for  myalgias, arthralgias, joint swelling, stiff joints and decreased range of motion  NEUROLOGICAL:  negative for memory problems, speech problems, visual disturbance, dysphagia, weakness and numbness      PHYSICAL EXAM:   CONSTITUTIONAL:  awake, alert, cooperative, no apparent distress, and appears stated age  EYES:  lids and lashes normal and pupils equal, round and reactive to light, anicteric sclerae  HEAD:  normocepalic, without obvious abnormality, atraumatic, pink, moist mucous membranes. NECK:  Supple, symmetrical, trachea midline, no adenopathy, thyroid symmetric, not enlarged and no tenderness, skin normal  HEMATOLOGIC/LYMPHATICS:  no cervical lymphadenopathy and no supraclavicular lymphadenopathy  LUNGS:  No increased work of breathing, good air exchange, clear to auscultation bilaterally, no crackles or wheezing  CARDIOVASCULAR:  Normal apical impulse, regular rate and rhythm, normal S1 and S2, no S3 or S4, and no murmur noted and no JVD, no carotid bruit, no pedal edema, good carotid upstroke bilaterally. ABDOMEN:  Soft, nontender, no masses, no hepatomegaly or splenomegaly, BS+  CHEST: nontender to palpation, expands symmetrically  MUSCULOSKELETAL:  No clubbing no cyanosis. there is no redness, warmth, or swelling of the joints  full range of motion noted  NEUROLOGIC:  Alert, awake,oriented x3.   SKIN:  no bruising or bleeding, normal skin color, texture, turgor and no redness, warmth, or swelling        /72   Pulse 75   Resp 16   Ht 5' 8\" (1.727 m)   Wt 195 lb (88.5 kg)   BMI 29.65 kg/m²     DATA:   I personally reviewed the visit EKG with the following interpretation: Sinus rhythm, low voltage QRS in the precordial leads, normal axis    EKG 3/20/22 Normal sinus rhythm  Normal ECG  When compared with ECG of 02-JAN-2022 10:01,  No significant change was found    ECHO: 5/13/20     Summary   Ejection fraction is visually estimated at 55%. No regional wall motion abnormalities seen. Mild left ventricular concentric hypertrophy noted. Normal right ventricle structure and function. Physiologic and/or trace mitral regurgitation is present    Stress Test: 5/13/20   Perfusion:  Normal exam       Function:  Normal exam       Angiography:   Cardiology Labs: BMP:    Lab Results   Component Value Date     03/20/2022    K 4.2 03/20/2022    K 4.1 08/05/2020     03/20/2022    CO2 23 03/20/2022    BUN 7 03/20/2022    CREATININE 0.7 03/20/2022     CMP:    Lab Results   Component Value Date     03/20/2022    K 4.2 03/20/2022    K 4.1 08/05/2020     03/20/2022    CO2 23 03/20/2022    BUN 7 03/20/2022    CREATININE 0.7 03/20/2022    PROT 7.0 03/20/2022     CBC:    Lab Results   Component Value Date    WBC 6.9 03/20/2022    RBC 4.82 03/20/2022    HGB 15.2 03/20/2022    HCT 45.4 03/20/2022    MCV 94.2 03/20/2022    RDW 12.9 03/20/2022     03/20/2022     PT/INR:  No results found for: PTINR  PT/INR Warfarin:  No components found for: PTPATWAR, PTINRWAR  PTT:  No results found for: APTT  PTT Heparin:  No components found for: APTTHEP  Magnesium:    Lab Results   Component Value Date    MG 1.9 03/20/2022     TSH:    Lab Results   Component Value Date    TSH 1.410 05/13/2020     TROPONIN:  No components found for: TROP  BNP:  No results found for: BNP  FASTING LIPID PANEL:    Lab Results   Component Value Date    CHOL 149 05/13/2020    HDL 35 05/13/2020    TRIG 125 05/13/2020     No orders to display     I have personally reviewed the laboratory, cardiac diagnostic and radiographic testing as outlined above:      IMPRESSION:  1. Palpitations: Had Zio patch put on by Dr. Ene Quintanilla, will follow on the results  2. Tobacco abuse: Patient was counseled regarding smoke cessation  3. History of COPD    RECOMMENDATIONS:   1. Continue current treatment  2. Patient was strongly advised to call 911 if symptoms recur or get worse for any reason  3. Follow-up with Dr. Hermelinda Rodriguez as scheduled  4. Follow-up with Dr. Karen Horowitz in 6 months, sooner if symptomatic for any reason    I have reviewed my findings and recommendations with patient    Thank you for the consult  Electronically signed by Jayme Xiao MD on 5/17/2022 at 10:45 AM      NOTE: This report was transcribed using voice recognition software.  Every effort was made to ensure accuracy; however, inadvertent computerized transcription errors may be present

## 2022-05-17 ENCOUNTER — OFFICE VISIT (OUTPATIENT)
Dept: CARDIOLOGY CLINIC | Age: 57
End: 2022-05-17
Payer: MEDICAID

## 2022-05-17 VITALS
HEIGHT: 68 IN | RESPIRATION RATE: 16 BRPM | SYSTOLIC BLOOD PRESSURE: 110 MMHG | DIASTOLIC BLOOD PRESSURE: 72 MMHG | HEART RATE: 75 BPM | WEIGHT: 195 LBS | BODY MASS INDEX: 29.55 KG/M2

## 2022-05-17 DIAGNOSIS — R07.9 CHEST PAIN, UNSPECIFIED TYPE: Primary | ICD-10-CM

## 2022-05-17 PROCEDURE — 99203 OFFICE O/P NEW LOW 30 MIN: CPT | Performed by: INTERNAL MEDICINE

## 2022-05-17 PROCEDURE — G8427 DOCREV CUR MEDS BY ELIG CLIN: HCPCS | Performed by: INTERNAL MEDICINE

## 2022-05-17 PROCEDURE — G8417 CALC BMI ABV UP PARAM F/U: HCPCS | Performed by: INTERNAL MEDICINE

## 2022-05-17 PROCEDURE — 93000 ELECTROCARDIOGRAM COMPLETE: CPT | Performed by: INTERNAL MEDICINE

## 2022-05-31 ENCOUNTER — HOSPITAL ENCOUNTER (OUTPATIENT)
Dept: MRI IMAGING | Age: 57
Discharge: HOME | End: 2022-06-02
Payer: MEDICAID

## 2022-05-31 DIAGNOSIS — G89.29 CHRONIC LEFT-SIDED LOW BACK PAIN WITH LEFT-SIDED SCIATICA: ICD-10-CM

## 2022-05-31 DIAGNOSIS — M54.42 CHRONIC LEFT-SIDED LOW BACK PAIN WITH LEFT-SIDED SCIATICA: ICD-10-CM

## 2022-05-31 PROCEDURE — 6360000004 HC RX CONTRAST MEDICATION: Performed by: RADIOLOGY

## 2022-05-31 PROCEDURE — 72158 MRI LUMBAR SPINE W/O & W/DYE: CPT

## 2022-05-31 PROCEDURE — A9577 INJ MULTIHANCE: HCPCS | Performed by: RADIOLOGY

## 2022-05-31 RX ADMIN — GADOBENATE DIMEGLUMINE 18 ML: 529 INJECTION, SOLUTION INTRAVENOUS at 10:10

## 2022-06-01 DIAGNOSIS — K21.9 GASTROESOPHAGEAL REFLUX DISEASE WITHOUT ESOPHAGITIS: ICD-10-CM

## 2022-06-01 DIAGNOSIS — G89.29 CHRONIC LEFT-SIDED LOW BACK PAIN WITH LEFT-SIDED SCIATICA: Primary | ICD-10-CM

## 2022-06-01 DIAGNOSIS — M54.42 CHRONIC LEFT-SIDED LOW BACK PAIN WITH LEFT-SIDED SCIATICA: Primary | ICD-10-CM

## 2022-06-01 DIAGNOSIS — K80.20 CALCULUS OF GALLBLADDER WITHOUT CHOLECYSTITIS WITHOUT OBSTRUCTION: ICD-10-CM

## 2022-06-01 DIAGNOSIS — E78.2 MIXED HYPERLIPIDEMIA: ICD-10-CM

## 2022-06-01 DIAGNOSIS — E55.9 VITAMIN D DEFICIENCY: ICD-10-CM

## 2022-06-01 RX ORDER — ATORVASTATIN CALCIUM 20 MG/1
20 TABLET, FILM COATED ORAL DAILY
Qty: 30 TABLET | Refills: 3 | Status: SHIPPED
Start: 2022-06-01 | End: 2022-10-11 | Stop reason: SDUPTHER

## 2022-06-01 RX ORDER — PANTOPRAZOLE SODIUM 40 MG/1
40 TABLET, DELAYED RELEASE ORAL
Qty: 30 TABLET | Refills: 3 | Status: SHIPPED
Start: 2022-06-01 | End: 2022-11-02

## 2022-06-01 RX ORDER — ACETAMINOPHEN 160 MG
1 TABLET,DISINTEGRATING ORAL DAILY
Qty: 30 CAPSULE | Refills: 3 | Status: SHIPPED
Start: 2022-06-01 | End: 2022-06-07

## 2022-06-01 RX ORDER — IBUPROFEN 600 MG/1
600 TABLET ORAL EVERY 6 HOURS PRN
Qty: 20 TABLET | Refills: 0 | Status: SHIPPED
Start: 2022-06-01 | End: 2022-07-25

## 2022-06-06 DIAGNOSIS — Z86.79 HISTORY OF PAROXYSMAL SUPRAVENTRICULAR TACHYCARDIA: ICD-10-CM

## 2022-06-06 DIAGNOSIS — R00.2 PALPITATIONS: ICD-10-CM

## 2022-06-07 ENCOUNTER — OFFICE VISIT (OUTPATIENT)
Dept: NEUROSURGERY | Age: 57
End: 2022-06-07
Payer: MEDICAID

## 2022-06-07 VITALS
BODY MASS INDEX: 28.79 KG/M2 | HEIGHT: 68 IN | SYSTOLIC BLOOD PRESSURE: 175 MMHG | DIASTOLIC BLOOD PRESSURE: 120 MMHG | WEIGHT: 190 LBS | HEART RATE: 91 BPM

## 2022-06-07 DIAGNOSIS — M54.50 CHRONIC LEFT-SIDED LOW BACK PAIN WITHOUT SCIATICA: Primary | ICD-10-CM

## 2022-06-07 DIAGNOSIS — G89.29 CHRONIC LEFT-SIDED LOW BACK PAIN WITHOUT SCIATICA: Primary | ICD-10-CM

## 2022-06-07 DIAGNOSIS — Z98.1 HISTORY OF LUMBAR FUSION: ICD-10-CM

## 2022-06-07 PROCEDURE — 3017F COLORECTAL CA SCREEN DOC REV: CPT | Performed by: PHYSICIAN ASSISTANT

## 2022-06-07 PROCEDURE — G8417 CALC BMI ABV UP PARAM F/U: HCPCS | Performed by: PHYSICIAN ASSISTANT

## 2022-06-07 PROCEDURE — G8427 DOCREV CUR MEDS BY ELIG CLIN: HCPCS | Performed by: PHYSICIAN ASSISTANT

## 2022-06-07 PROCEDURE — 4004F PT TOBACCO SCREEN RCVD TLK: CPT | Performed by: PHYSICIAN ASSISTANT

## 2022-06-07 PROCEDURE — 99203 OFFICE O/P NEW LOW 30 MIN: CPT | Performed by: PHYSICIAN ASSISTANT

## 2022-06-07 RX ORDER — METHYLPREDNISOLONE 4 MG/1
TABLET ORAL
Qty: 1 KIT | Refills: 0 | Status: SHIPPED | OUTPATIENT
Start: 2022-06-07 | End: 2022-06-13

## 2022-06-07 RX ORDER — CYCLOBENZAPRINE HCL 5 MG
TABLET ORAL
COMMUNITY
End: 2022-06-17

## 2022-06-07 RX ORDER — CHOLECALCIFEROL (VITAMIN D3) 50 MCG
TABLET ORAL
COMMUNITY
Start: 2022-05-21 | End: 2022-10-11 | Stop reason: SDUPTHER

## 2022-06-07 RX ORDER — CETIRIZINE HYDROCHLORIDE 10 MG/1
TABLET ORAL
COMMUNITY
End: 2022-09-20

## 2022-06-07 RX ORDER — FAMOTIDINE 20 MG/1
TABLET, FILM COATED ORAL
COMMUNITY
End: 2022-09-20

## 2022-06-07 ASSESSMENT — ENCOUNTER SYMPTOMS
BACK PAIN: 1
ABDOMINAL PAIN: 1
SHORTNESS OF BREATH: 0
TROUBLE SWALLOWING: 0
PHOTOPHOBIA: 0

## 2022-06-07 NOTE — PROGRESS NOTES
Subjective:      Patient ID: Apolinar Carrero is a 64 y.o. female. Solomon Higgins is a 64year old female with a past medical history of HLD, COPD, cervical cancer, osteoarthritis, GERD, hiatal hernia, SVT, and hx L4-S1 fusion approximately 15 years ago in [place]. Pt smokes 1-1/2ppd. She presents to the office today as a new patient c/o left sided low back pain with radiation into her abdomen. Describes the pain as constant and severe. Pain has been presents since 1/1/22. Walking, sitting, standing, and twisting makes the pain worse. She has seen pain management, GI, and her PCP. Pt states she is unable to complete physical therapy due to the pain. She has tried muscle relaxers, ibuprofen, and rest without significant pain relief. Denies recent injections. Denies loss of bowel or bladder, saddle anesthesia, pain down the legs, numbness, tingling, fever, chills, N/V, SOB, or chest pain. MRI lumbar spine 5/31/22 demonstrates satisfactory alignment of the lumbar spine status post L4-S1 posterior fusion instrumentation. No significant canal or foraminal stenosis within the lumbar spine. Multilevel facet joint arthropathy. CT lumbar spine 1/11/22 demonstrates stable fusion without hardware complication. No acute fracture or subluxation. Degenerative and postoperative changes in the lumbar spine without evidence of central canal stenosis or definite neural foraminal narrowing. Lumbar flex/ex films: No instability or movement seen with flexion and extension.                  Review of Systems   Constitutional: Negative for fever and unexpected weight change. HENT: Negative for trouble swallowing. Eyes: Negative for photophobia and visual disturbance. Respiratory: Negative for shortness of breath. Cardiovascular: Negative for chest pain. Gastrointestinal: Positive for abdominal pain. Endocrine: Negative for heat intolerance. Genitourinary: Positive for flank pain.    Musculoskeletal: Positive for back pain. Negative for gait problem, myalgias and neck pain. Skin: Negative for wound. Neurological: Positive for weakness. Negative for numbness and headaches. Psychiatric/Behavioral: Negative for confusion. Objective:   Physical Exam  Constitutional:       General: She is in acute distress. Appearance: She is well-developed. HENT:      Head: Normocephalic and atraumatic. Eyes:      Conjunctiva/sclera: Conjunctivae normal.      Pupils: Pupils are equal, round, and reactive to light. Cardiovascular:      Rate and Rhythm: Normal rate. Pulmonary:      Effort: Pulmonary effort is normal.   Abdominal:      General: There is no distension. Musculoskeletal:      Cervical back: Normal range of motion and neck supple. Comments: Significant tenderness to palpation midline to left sided lumbar spine and flank   Skin:     General: Skin is warm and dry. Neurological:      Mental Status: She is alert. Comments: Alert and oriented x3  CN3-12 intact  Upper strength full  BLE 4/5 secondary to pain in back  Sensation intact to light touch     Psychiatric:         Thought Content: Thought content normal.         Assessment: This is a 64year old female presenting for left sided low back pain into her abdomen. Hx L4-S1 fusion at [facility] over 15 years ago. Plan:      -MRI, CT, and x-rays reviewed and discussed with patient in detail. No significant stenosis, no complication of hardware, and no listhesis. No neurosurgical intervention.   -Recommend continuing conservative treatments including pain clinic. Possible facet joint or left SI joint injections. Information given. -Medrol dosepack sent to pharmacy. Patient states she has had before and did not have an allergic reaction. If she has a reaction, stop taking.   -OARRS report reviewed  -Return to neurosurgery clinic PRN  -Call/return sooner if symptoms worsen or new issues arise in the interim         Shawn Glass PA-C

## 2022-06-10 ENCOUNTER — TELEPHONE (OUTPATIENT)
Dept: FAMILY MEDICINE CLINIC | Age: 57
End: 2022-06-10

## 2022-06-17 ENCOUNTER — OFFICE VISIT (OUTPATIENT)
Dept: PAIN MANAGEMENT | Age: 57
End: 2022-06-17
Payer: MEDICAID

## 2022-06-17 VITALS
SYSTOLIC BLOOD PRESSURE: 132 MMHG | BODY MASS INDEX: 28.79 KG/M2 | OXYGEN SATURATION: 95 % | HEART RATE: 82 BPM | WEIGHT: 190 LBS | TEMPERATURE: 97.3 F | HEIGHT: 68 IN | DIASTOLIC BLOOD PRESSURE: 80 MMHG

## 2022-06-17 DIAGNOSIS — M96.1 LUMBAR POSTLAMINECTOMY SYNDROME: Primary | ICD-10-CM

## 2022-06-17 DIAGNOSIS — M53.3 SACROILIAC DYSFUNCTION: ICD-10-CM

## 2022-06-17 DIAGNOSIS — M47.817 LUMBOSACRAL SPONDYLOSIS WITHOUT MYELOPATHY: ICD-10-CM

## 2022-06-17 DIAGNOSIS — M51.36 DDD (DEGENERATIVE DISC DISEASE), LUMBAR: ICD-10-CM

## 2022-06-17 PROCEDURE — 3017F COLORECTAL CA SCREEN DOC REV: CPT | Performed by: ANESTHESIOLOGY

## 2022-06-17 PROCEDURE — 99214 OFFICE O/P EST MOD 30 MIN: CPT | Performed by: ANESTHESIOLOGY

## 2022-06-17 PROCEDURE — 4004F PT TOBACCO SCREEN RCVD TLK: CPT | Performed by: ANESTHESIOLOGY

## 2022-06-17 PROCEDURE — G8427 DOCREV CUR MEDS BY ELIG CLIN: HCPCS | Performed by: ANESTHESIOLOGY

## 2022-06-17 PROCEDURE — G8417 CALC BMI ABV UP PARAM F/U: HCPCS | Performed by: ANESTHESIOLOGY

## 2022-06-17 RX ORDER — METRONIDAZOLE 500 MG/1
TABLET ORAL
COMMUNITY
Start: 2022-06-07 | End: 2022-09-20

## 2022-06-17 RX ORDER — PREGABALIN 50 MG/1
50 CAPSULE ORAL 2 TIMES DAILY
Qty: 60 CAPSULE | Refills: 1 | Status: SHIPPED
Start: 2022-06-17 | End: 2022-09-20

## 2022-06-17 RX ORDER — CIPROFLOXACIN 500 MG/1
TABLET, FILM COATED ORAL
COMMUNITY
Start: 2022-06-07 | End: 2022-09-20 | Stop reason: ALTCHOICE

## 2022-06-17 RX ORDER — CYCLOBENZAPRINE HCL 5 MG
5 TABLET ORAL 2 TIMES DAILY PRN
Qty: 30 TABLET | Refills: 1 | Status: SHIPPED | OUTPATIENT
Start: 2022-06-17 | End: 2022-07-17

## 2022-06-17 NOTE — PROGRESS NOTES
Do you currently have any of the following:    Fever: No  Headache:  No  Cough: No  Shortness of breath: No  Exposed to anyone with these symptoms: No                                                                                                                Mary River presents to the [facility] on 6/17/2022. Ryan Barajas is complaining of pain in lower back and left lower quadrant. The pain is constant. The pain is described as aching, shooting and sharp. Pain is rated on her best day at a 6, on her worst day at a 10, and on average at a 9 on the VAS scale. She took her last dose of Motrinlastnight. Ryan Barajas does not have issues with constipation. Any procedures since your last visit: No.    She is  on NSAIDS and  is not on anticoagulation medications to include none and is managed by none. Pacemaker or defibrillator: No.    Medication Contract and Consent for Opioid Use Documents Filed      No documents found                   Temp 97.3 °F (36.3 °C) (Infrared)   Ht 5' 8\" (1.727 m)   Wt 190 lb (86.2 kg)   BMI 28.89 kg/m²      No LMP recorded. Patient has had an ablation.

## 2022-06-17 NOTE — PROGRESS NOTES
[address] [facility], [address] [place] [address] Luke  [phone]    Follow up Note      Roberto River     Date of Visit:  6/17/2022    CC:  Patient presents for follow up   Chief Complaint   Patient presents with    Back Pain     left lower    Abdominal Pain     left lower       HPI:  Chronic low back pain.     Prior L4-L5-S1 interbody fusion > 10 yrs ago. Did well until recently.     She noticed recent onset left-sided abdominal pain/flank pain and low back pain.     Has been evaluated for that she is undergone CT abdomen and CT lumbar spine. Has been evaluated by Nelsy on 6/7/2022- no surgical indication.     Pain causes functional limitations/ limits Adl's : Yes      Nursing notes and details of the pain history reviewed. Please see intake notes for details.     Previous treatments:   Physical Therapy : yes      Medications: - NSAID's : yes                         - Adjuvants or Others : yes,        Lumbar spine Surgeries: yes, L4-5-S1 fusion     She has not been on anticoagulation medications      She has not been on herbal supplements.       H/O Smoking: Yes  H/O alcohol abuse : no  H/O Illicit drug use : no     Employment: disability     Imaging:     MRI of LS Spine: 5/31/2022:     FINDINGS:   BONES/ALIGNMENT: There is normal alignment of the spine. The vertebral body   heights are maintained.  The bone marrow signal appears unremarkable.  Again   seen are postsurgical changes from prior posterior fusion instrumentation at   L4 through S1 with bilateral transpedicular screws and parallel interlocking   rods.  Inter disc spacer at L4-5 and inter disc screw at L5-S1 again noted.       SPINAL CORD:  The conus terminates normally.       SOFT TISSUES: No abnormal enhancement is seen of the lumbar spine.  No   paraspinal mass identified.       L1-L2: There is no significant disc protrusion, spinal canal stenosis or   neural foraminal narrowing.       L2-L3: There is no significant disc protrusion, spinal canal stenosis or   neural foraminal narrowing.       L3-L4: Disc bulge and facet arthropathy contributing to mild bilateral   foraminal narrowing.  No significant canal stenosis.       L4-L5: There is no significant disc protrusion, spinal canal stenosis or   neural foraminal narrowing.       L5-S1: There is no significant disc protrusion, spinal canal stenosis or   neural foraminal narrowing.           Impression   1. Satisfactory alignment of the lumbar spine status post L4-S1 posterior   fusion instrumentation. 2. No significant canal or foraminal stenosis within the lumbar spine.           Xray LS spine flex ext: 3/3/2022:  Impression   Posterior fusion of the L4, L5, and S1 levels with satisfactory alignment and   no evidence of inducible instability in flexion or extension views. Narrowing of the neural foramina bilaterally at the L4/L5 and L5/S1 levels. Minimal degenerative changes seen within the sacroiliac joints bilaterally.            CT lumbar spine: 1/11/2022:  FINDINGS:   There are bilateral pedicle screws at L4, L5, and S1 with intervening   posterior fusion bars.  There is threaded fusion screw across the L5-S1   intervertebral disc.  There is no evidence of acute fracture.  Vertebral   alignment is anatomic.  There are no compression deformities.  There is right   hemilaminotomy at L4 and left hemilaminotomy at L5.  There is bilateral facet   hypertrophy at L3-4 and L5-S1 as well as left facet hypertrophy at L4-5.  The   paravertebral soft tissue structures are unremarkable. Hollace Gondola is no gross   evidence of central canal stenosis or neural foraminal narrowing.  There is a   left adrenal adenoma.  The patient is status post cholecystectomy. Hollace Gondola is   arteriosclerosis without abdominal aortic aneurysm.           Impression   1. No acute fracture or subluxation.    2. Degenerative and postoperative changes in the lumbar spine without   evidence of central canal stenosis or definite neural foraminal narrowing.      CT abdomen 1/2/2022:  Impression   1.  No acute abdominal pelvic abnormalities.  No sign of urinary calculi. 2. Incidental diverticulosis, no signs of diverticulitis. 3. Unchanged 9 mm left adrenal adenoma.             Potential Aberrant Drug-Related Behavior:      Urine Drug Screening:    OARRS report[de-identified] reviewed. Past Medical History:   Diagnosis Date    At high risk for deep venous thrombosis     Cancer ([facility] [address]     cervical    COPD (chronic obstructive pulmonary disease) (HCC)     early stage    DJD (degenerative joint disease) of knee 7/18/2014    GERD (gastroesophageal reflux disease)     H/O cardiovascular stress test 05/13/2020    Lexiscan    Headache     Hiatal hernia     Mixed hyperlipidemia 10/25/2016    SVT (supraventricular tachycardia) (HCC)     had episode of SVT 6 months ago and several small episode over last couple months       Past Surgical History:   Procedure Laterality Date    ABLATION OF DYSRHYTHMIC FOCUS  10/06/2020    Succesful Ablation AVN Reentry Tachycardia    (Dr. Ro Mason)    BACK SURGERY      lumbar pins & screws    BONE BIOPSY      CHOLECYSTECTOMY, LAPAROSCOPIC N/A 3/6/2020    CHOLECYSTECTOMY LAPAROSCOPIC performed by Loretta Oden MD at [address] [facility], COLON, DIAGNOSTIC      KNEE ARTHROSCOPY Right 05/05/2017    KNEE SURGERY Left     arthroscopy    TONSILLECTOMY      TUBAL LIGATION      UPPER GASTROINTESTINAL ENDOSCOPY N/A 3/6/2020    EGD ESOPHAGOGASTRODUODENOSCOPY performed by Loretta Oden MD at [address]       Prior to Admission medications    Medication Sig Start Date End Date Taking?  Authorizing Provider   ciprofloxacin (CIPRO) 500 MG tablet  6/7/22  Yes Historical Provider, MD   metroNIDAZOLE (FLAGYL) 500 MG tablet  6/7/22  Yes Historical Provider, MD   vitamin D (CHOLECALCIFEROL) 50 MCG (2000 UT) TABS tablet  5/21/22  Yes Historical Provider, MD   cyclobenzaprine (FLEXERIL) 5 MG tablet    Yes Historical Provider, MD   famotidine (PEPCID) 20 MG tablet    Yes Historical Provider, MD   ibuprofen (IBU) 600 MG tablet Take 1 tablet by mouth every 6 hours as needed for Pain 6/1/22 6/17/22 Yes Pili Severe, APRN - CNP   pantoprazole (PROTONIX) 40 MG tablet Take 1 tablet by mouth every morning (before breakfast) 6/1/22  Yes Pili Severe, APRN - CNP   atorvastatin (LIPITOR) 20 MG tablet Take 1 tablet by mouth daily 6/1/22  Yes Pili Severe, APRN - CNP   PROAIR  (83 Base) MCG/ACT inhaler Inhale 2 puffs into the lungs every 6 hours as needed for Wheezing (or Cough spells) 2/23/22  Yes Rosalio Wood, DO   budesonide-formoterol [facility]) 160-4.5 MCG/ACT AERO Inhale 2 puffs into the lungs 2 times daily 1/3/22  Yes Pili Severe, APRN - CNP   cetirizine (ZYRTEC) 10 MG tablet     Historical Provider, MD       Allergies   Allergen Reactions    Cortisone Other (See Comments)     Redness and breaking into a sweat.     Medrol [Methylprednisolone]      Fever; redness; sweat    Bactrim [Sulfamethoxazole-Trimethoprim] Hives and Angioedema    Oxycodone Hcl     Oxycontin [Oxycodone Hcl] Itching    Penicillins      Cant remember reaction was from childhood    Sulfa Antibiotics        Social History     Socioeconomic History    Marital status:      Spouse name: Not on file    Number of children: Not on file    Years of education: Not on file    Highest education level: Not on file   Occupational History    Not on file   Tobacco Use    Smoking status: Current Every Day Smoker     Packs/day: 1.00     Years: 30.00     Pack years: 30.00     Types: Cigarettes    Smokeless tobacco: Never Used   Vaping Use    Vaping Use: Never used   Substance and Sexual Activity    Alcohol use: Yes     Comment: rarely    Drug use: No    Sexual activity: Yes   Other Topics Concern    Not on file   Social History Narrative    Not on difficulty     HEENT:     Head:normocephalic, atraumatic  Pupils:regular, round, equal  Sclera: icterus absent     Lungs:     Breathing:normal breathing pattern      CVS:     RRR     Abdomen:     Shape:non-distended and normal  Tenderness:+ on the left side on deep palpation  Guarding:none     Cervical spine:     Inspection:normal  Palpation:tenderness paravertebral muscles, tenderness trapezium, left, right and positive. Range of motion:Normal      Thoracic spine:                Spine inspection:normal   Palpation:No tenderness over the midline and paraspinal area, bilaterally  Range of motion:normal in flexion, extension rotation bilateral and is not painful.     Lumbar spine:     Spine inspection: Scar from the prior surgery noted healed well  Palpation: Tenderness paravertebral muscles Yes bilaterally  Range of motion: Decreased, flexion Decreased, Lateral bending, extension and rotation bilaterally reduced is somewhat painful. Sacroiliac joint tenderness Yes left  SLR : negative bilaterally      Musculoskeletal:     Trigger points no     Extremities:     Tremors:None bilaterally upper and lower  Edema:no     Neurological:     Sensory: Normal to light touch      Motor:                   Right Quadriceps 5/5          Left Quadriceps 5/5           Right Gastrocnemius 5/5    Left Gastrocnemius 5/5  Right Ant Tibialis 5/5  Left Ant Tibialis 5/5     Reflexes:    B/l LE equal     Dermatology:     Skin:no rashes or lesions noted      Assessment/Plan:   Diagnosis Orders   1. Lumbar postlaminectomy syndrome  pregabalin (LYRICA) 50 MG capsule   2. Lumbosacral spondylosis without myelopathy     3. Sacroiliac dysfunction     4. DDD (degenerative disc disease), lumbar         64 y.o.  female with H/o previous lumbar spine fusion surgery at L4 L5-S1 > 10 years ago.      Has noticed recent onset left-sided abdominal pain/flank pain and low back pain.   Denies lower extremity radiation or weakness or numbness.     Recent CT lumbar spine/ MRI of LS spine reviewed and discussed with the patient.     CT abdomen findings reviewed: Incidental diverticulosis, no signs of diverticulitis. Unchanged 9 mm left adrenal adenoma.     She has tenderness over the left side of the abdomen. Has been evaluated by Gen surgery- has prescribed antibiotics. Also has recommended GI eval for EGD/ colonoscopy. Not done yet. Has also been evaluated by NSG had MRI. No surgical interventions. Has been prescribed Medrol dose pack. Not started yet.     Has tenderness over the left lumbar facet and left SIJ. Also has tenderness over the left side of the abdomen. Pain appears to be multifactorial.     Recent Xray / MRI/ pertinent images/ consult notes reviewed.     Recommend: To start Medrol dose pack. Offered trial of left lumbar facet Injection above the fusion and left SIJ injection. Patient reluctant to get any procedure. Significant muscle spasm in paraspinal area- offered TPI. Patient reluctant to have the procedure. She will think and call us. Compound cream- expensive. Diclofenac gel.     Recommend f/u with GI eval for abdominal pain.     Short course of Flexeril for prn use. Trial of Lyrica 50 gm bid. Considering second opinion.     F/U prn.     Counseling :Patient encouraged to stay active and to continue Regular home exercise program as tolerated - stretching / strengthening.     Smoking cessation counseling : yes      Treatment plan discussed with the patient including medication and procedure side effects.     Controlled Substances Monitoring: OARRS reviewed.  Ryan Etienne MD    NOTE: The above documentation was prepared using voice recognition software. Every attempt was made to ensure accuracy but there may be spelling, grammatical, and contextual errors.

## 2022-07-25 RX ORDER — IBUPROFEN 600 MG/1
600 TABLET ORAL EVERY 6 HOURS PRN
Qty: 20 TABLET | Refills: 0 | Status: SHIPPED | OUTPATIENT
Start: 2022-07-25 | End: 2022-10-11

## 2022-09-16 ENCOUNTER — TELEPHONE (OUTPATIENT)
Dept: NON INVASIVE DIAGNOSTICS | Age: 57
End: 2022-09-16

## 2022-09-16 RX ORDER — METOPROLOL SUCCINATE 25 MG/1
25 TABLET, EXTENDED RELEASE ORAL DAILY
Qty: 30 TABLET | Refills: 5 | Status: SHIPPED | OUTPATIENT
Start: 2022-09-16

## 2022-09-16 RX ORDER — METOPROLOL SUCCINATE 25 MG/1
25 TABLET, EXTENDED RELEASE ORAL DAILY
COMMUNITY
End: 2022-09-16 | Stop reason: SDUPTHER

## 2022-09-16 NOTE — TELEPHONE ENCOUNTER
The patient was agreeable for the starting of toprolol XL. Sent to  for approval to send to [facility]. The patient also was scheduled for overdue 6 mo OV with NP next week on 09/20/2022.

## 2022-09-16 NOTE — TELEPHONE ENCOUNTER
Spoke to the patient and she said that she went to the ER last night at [facility]. She said that she is having a lot of palpitations, dizziness, lightheaded. She also said that it feels like her heart \"stops\". Advised the patient that I was getting your recommendations but since it was the weekend to go to the ER if she does not feel well. She verbalized understanding.

## 2022-09-18 ENCOUNTER — APPOINTMENT (OUTPATIENT)
Dept: GENERAL RADIOLOGY | Age: 57
End: 2022-09-18
Payer: MEDICAID

## 2022-09-18 ENCOUNTER — HOSPITAL ENCOUNTER (EMERGENCY)
Age: 57
Discharge: HOME | End: 2022-09-18
Attending: EMERGENCY MEDICINE
Payer: MEDICAID

## 2022-09-18 VITALS
OXYGEN SATURATION: 98 % | SYSTOLIC BLOOD PRESSURE: 114 MMHG | RESPIRATION RATE: 18 BRPM | BODY MASS INDEX: 29.19 KG/M2 | DIASTOLIC BLOOD PRESSURE: 80 MMHG | HEART RATE: 94 BPM | WEIGHT: 192 LBS | TEMPERATURE: 97.7 F

## 2022-09-18 DIAGNOSIS — J44.1 COPD EXACERBATION (HCC): Primary | ICD-10-CM

## 2022-09-18 LAB
ALBUMIN SERPL-MCNC: 4.4 G/DL (ref 3.5–5.2)
ALP BLD-CCNC: 97 U/L (ref 35–104)
ALT SERPL-CCNC: 30 U/L (ref 0–32)
ANION GAP SERPL CALCULATED.3IONS-SCNC: 12 MMOL/L (ref 7–16)
AST SERPL-CCNC: 23 U/L (ref 0–31)
BASOPHILS ABSOLUTE: 0.02 E9/L (ref 0–0.2)
BASOPHILS RELATIVE PERCENT: 0.2 % (ref 0–2)
BILIRUB SERPL-MCNC: 0.3 MG/DL (ref 0–1.2)
BUN BLDV-MCNC: 15 MG/DL (ref 6–20)
CALCIUM SERPL-MCNC: 9.6 MG/DL (ref 8.6–10.2)
CHLORIDE BLD-SCNC: 103 MMOL/L (ref 98–107)
CO2: 21 MMOL/L (ref 22–29)
CREAT SERPL-MCNC: 0.7 MG/DL (ref 0.5–1)
EOSINOPHILS ABSOLUTE: 0.06 E9/L (ref 0.05–0.5)
EOSINOPHILS RELATIVE PERCENT: 0.7 % (ref 0–6)
GFR AFRICAN AMERICAN: >60
GFR NON-AFRICAN AMERICAN: >60 ML/MIN/1.73
GLUCOSE BLD-MCNC: 103 MG/DL (ref 74–99)
HCT VFR BLD CALC: 41.6 % (ref 34–48)
HEMOGLOBIN: 14.5 G/DL (ref 11.5–15.5)
IMMATURE GRANULOCYTES #: 0.04 E9/L
IMMATURE GRANULOCYTES %: 0.5 % (ref 0–5)
LYMPHOCYTES ABSOLUTE: 1.86 E9/L (ref 1.5–4)
LYMPHOCYTES RELATIVE PERCENT: 22.4 % (ref 20–42)
MCH RBC QN AUTO: 32.4 PG (ref 26–35)
MCHC RBC AUTO-ENTMCNC: 34.9 % (ref 32–34.5)
MCV RBC AUTO: 93.1 FL (ref 80–99.9)
MONOCYTES ABSOLUTE: 0.59 E9/L (ref 0.1–0.95)
MONOCYTES RELATIVE PERCENT: 7.1 % (ref 2–12)
NEUTROPHILS ABSOLUTE: 5.73 E9/L (ref 1.8–7.3)
NEUTROPHILS RELATIVE PERCENT: 69.1 % (ref 43–80)
PDW BLD-RTO: 12.4 FL (ref 11.5–15)
PLATELET # BLD: 179 E9/L (ref 130–450)
PMV BLD AUTO: 10.8 FL (ref 7–12)
POTASSIUM REFLEX MAGNESIUM: 3.6 MMOL/L (ref 3.5–5)
PRO-BNP: 8 PG/ML (ref 0–125)
RBC # BLD: 4.47 E12/L (ref 3.5–5.5)
SARS-COV-2, NAAT: NOT DETECTED
SODIUM BLD-SCNC: 136 MMOL/L (ref 132–146)
TOTAL PROTEIN: 7 G/DL (ref 6.4–8.3)
TROPONIN, HIGH SENSITIVITY: <6 NG/L (ref 0–9)
WBC # BLD: 8.3 E9/L (ref 4.5–11.5)

## 2022-09-18 PROCEDURE — 94664 DEMO&/EVAL PT USE INHALER: CPT

## 2022-09-18 PROCEDURE — 80053 COMPREHEN METABOLIC PANEL: CPT

## 2022-09-18 PROCEDURE — 36415 COLL VENOUS BLD VENIPUNCTURE: CPT

## 2022-09-18 PROCEDURE — 99284 EMERGENCY DEPT VISIT MOD MDM: CPT

## 2022-09-18 PROCEDURE — 83880 ASSAY OF NATRIURETIC PEPTIDE: CPT

## 2022-09-18 PROCEDURE — 85025 COMPLETE CBC W/AUTO DIFF WBC: CPT

## 2022-09-18 PROCEDURE — 87635 SARS-COV-2 COVID-19 AMP PRB: CPT

## 2022-09-18 PROCEDURE — 94640 AIRWAY INHALATION TREATMENT: CPT

## 2022-09-18 PROCEDURE — 84484 ASSAY OF TROPONIN QUANT: CPT

## 2022-09-18 PROCEDURE — 71046 X-RAY EXAM CHEST 2 VIEWS: CPT

## 2022-09-18 PROCEDURE — 6370000000 HC RX 637 (ALT 250 FOR IP): Performed by: STUDENT IN AN ORGANIZED HEALTH CARE EDUCATION/TRAINING PROGRAM

## 2022-09-18 RX ORDER — IPRATROPIUM BROMIDE AND ALBUTEROL SULFATE 2.5; .5 MG/3ML; MG/3ML
3 SOLUTION RESPIRATORY (INHALATION) ONCE
Status: COMPLETED | OUTPATIENT
Start: 2022-09-18 | End: 2022-09-18

## 2022-09-18 RX ADMIN — IPRATROPIUM BROMIDE AND ALBUTEROL SULFATE 3 AMPULE: .5; 2.5 SOLUTION RESPIRATORY (INHALATION) at 20:27

## 2022-09-18 ASSESSMENT — ENCOUNTER SYMPTOMS
VOMITING: 0
SHORTNESS OF BREATH: 1
NAUSEA: 0
DIARRHEA: 0
COLOR CHANGE: 0
ABDOMINAL PAIN: 0
BACK PAIN: 0
COUGH: 0

## 2022-09-18 ASSESSMENT — PAIN - FUNCTIONAL ASSESSMENT: PAIN_FUNCTIONAL_ASSESSMENT: NONE - DENIES PAIN

## 2022-09-19 NOTE — ED PROVIDER NOTES
HPI   51-year-old female patient presents to emergency department for evaluation of shortness of breath. She has a past history of COPD. She has been using inhalers at home without any relief of symptoms. She is denying any fevers, cough, chills, nausea, vomiting, diarrhea. No chest pain no history of pulmonary embolism or DVT. She denies any recent travel, no calf pain no leg swelling. She is not on any blood thinners. She states she has felt like this in the past but usually inhaler helps her symptoms. Patient has multiple allergies listed to cortisone and Solu-Medrol   Review of Systems   Constitutional:  Negative for chills and fever. HENT:  Negative for congestion. Respiratory:  Positive for shortness of breath. Negative for cough. Cardiovascular:  Negative for chest pain. Gastrointestinal:  Negative for abdominal pain, diarrhea, nausea and vomiting. Genitourinary:  Negative for difficulty urinating, dysuria and hematuria. Musculoskeletal:  Negative for back pain. Skin:  Negative for color change. All other systems reviewed and are negative. Physical Exam  Vitals and nursing note reviewed. Constitutional:       Appearance: Normal appearance. HENT:      Head: Normocephalic and atraumatic. Nose: Nose normal. No congestion. Mouth/Throat:      Mouth: Mucous membranes are moist.      Pharynx: Oropharynx is clear. Eyes:      Conjunctiva/sclera: Conjunctivae normal.      Pupils: Pupils are equal, round, and reactive to light. Cardiovascular:      Rate and Rhythm: Normal rate and regular rhythm. Pulses: Normal pulses. Heart sounds: Normal heart sounds. Pulmonary:      Effort: Pulmonary effort is normal.      Comments: Mild expiratory wheezes, no respiratory distress, no tachypnea. Abdominal:      General: Bowel sounds are normal. There is no distension. Tenderness: There is no abdominal tenderness.    Musculoskeletal:         General: Normal range of motion. Cervical back: Normal range of motion and neck supple. Skin:     General: Skin is warm and dry. Capillary Refill: Capillary refill takes less than 2 seconds. Neurological:      General: No focal deficit present. Mental Status: She is alert. Procedures     MDM  51-year-old female patient presented to emergency department for evaluation of shortness of breath for 3 days. She has past history of COPD not on any oxygen. Patient with mild expiratory wheeze on examination. No other acute findings or complaints of chest pain. Troponin was less than 6. Chest x-ray was negative. I gave the patient DuoNeb treatments here significant improvement wheezing and resolved. Patient was feeling much better. Said to withhold steroids as patient sided Medrol and cortisone allergy. At this time since patient is feeling better she is deemed stable for discharge home, she has inhalers at home. Vital signs at all remained stable. ED Course as of 09/18/22 8971   [facility] Sep 18, 2022   2204 Patient significantly improved no further wheezing. No chest pain. She is feeling much better vitals show she is satting 98% on room air. [FG]      ED Course User Index  [FG] Rosario Segal, DO       --------------------------------------------- PAST HISTORY ---------------------------------------------  Past Medical History:  has a past medical history of At high risk for deep venous thrombosis, Cancer ([facility] [address] COPD (chronic obstructive pulmonary disease) ([facility] [address] DJD (degenerative joint disease) of knee, GERD (gastroesophageal reflux disease), H/O cardiovascular stress test, Headache, Hiatal hernia, Mixed hyperlipidemia, and SVT (supraventricular tachycardia) ([address] Past Surgical History:  has a past surgical history that includes back surgery; knee surgery (Left); Tonsillectomy; Tubal ligation; Endometrial ablation; Knee arthroscopy (Right, 05/05/2017);  Endometrial ablation; bone biopsy; Endoscopy, colon, Potassium reflex Magnesium 3.6 3.5 - 5.0 mmol/L    Chloride 103 98 - 107 mmol/L    CO2 21 (L) 22 - 29 mmol/L    Anion Gap 12 7 - 16 mmol/L    Glucose 103 (H) 74 - 99 mg/dL    BUN 15 6 - 20 mg/dL    Creatinine 0.7 0.5 - 1.0 mg/dL    GFR Non-African American >60 >=60 mL/min/1.73    GFR African American >60     Calcium 9.6 8.6 - 10.2 mg/dL    Total Protein 7.0 6.4 - 8.3 g/dL    Albumin 4.4 3.5 - 5.2 g/dL    Total Bilirubin 0.3 0.0 - 1.2 mg/dL    Alkaline Phosphatase 97 35 - 104 U/L    ALT 30 0 - 32 U/L    AST 23 0 - 31 U/L   Troponin   Result Value Ref Range    Troponin, High Sensitivity <6 0 - 9 ng/L   Brain Natriuretic Peptide   Result Value Ref Range    Pro-BNP 8 0 - 125 pg/mL       Radiology:  XR CHEST (2 VW)   Final Result   No acute process. COPD.             ------------------------- NURSING NOTES AND VITALS REVIEWED ---------------------------  Date / Time Roomed:  9/18/2022  7:54 PM  ED Bed Assignment:  14/14    The nursing notes within the ED encounter and vital signs as below have been reviewed. /80   Pulse 94   Temp 97.7 °F (36.5 °C)   Resp 18   Wt 192 lb (87.1 kg)   SpO2 98%   BMI 29.19 kg/m²   Oxygen Saturation Interpretation: Normal    --------------------------------- ADDITIONAL PROVIDER NOTES ---------------------------------  At this time the patient is without objective evidence of an acute process requiring hospitalization or inpatient management. They have remained hemodynamically stable throughout their entire ED visit and are stable for discharge with outpatient follow-up. The plan has been discussed in detail and they are aware of the specific conditions for emergent return, as well as the importance of follow-up. Discharge Medication List as of 9/18/2022 10:07 PM          Diagnosis:  1. COPD exacerbation ([facility] [address]        Disposition:  Patient's disposition: Discharge to home  Patient's condition is stable.        Vitaliy Macdonald DO  Resident  09/18/22 [id number]

## 2022-09-20 ENCOUNTER — OFFICE VISIT (OUTPATIENT)
Dept: NON INVASIVE DIAGNOSTICS | Age: 57
End: 2022-09-20
Payer: MEDICAID

## 2022-09-20 VITALS
HEART RATE: 74 BPM | DIASTOLIC BLOOD PRESSURE: 82 MMHG | RESPIRATION RATE: 18 BRPM | WEIGHT: 193.8 LBS | HEIGHT: 68 IN | BODY MASS INDEX: 29.37 KG/M2 | SYSTOLIC BLOOD PRESSURE: 124 MMHG

## 2022-09-20 DIAGNOSIS — I47.1 PAROXYSMAL SVT (SUPRAVENTRICULAR TACHYCARDIA) (HCC): ICD-10-CM

## 2022-09-20 DIAGNOSIS — R00.2 PALPITATIONS: Primary | ICD-10-CM

## 2022-09-20 PROCEDURE — G8417 CALC BMI ABV UP PARAM F/U: HCPCS | Performed by: NURSE PRACTITIONER

## 2022-09-20 PROCEDURE — 99214 OFFICE O/P EST MOD 30 MIN: CPT | Performed by: NURSE PRACTITIONER

## 2022-09-20 PROCEDURE — 93000 ELECTROCARDIOGRAM COMPLETE: CPT | Performed by: INTERNAL MEDICINE

## 2022-09-20 PROCEDURE — 3017F COLORECTAL CA SCREEN DOC REV: CPT | Performed by: NURSE PRACTITIONER

## 2022-09-20 PROCEDURE — G8427 DOCREV CUR MEDS BY ELIG CLIN: HCPCS | Performed by: NURSE PRACTITIONER

## 2022-09-20 PROCEDURE — 4004F PT TOBACCO SCREEN RCVD TLK: CPT | Performed by: NURSE PRACTITIONER

## 2022-09-20 RX ORDER — FLUTICASONE FUROATE, UMECLIDINIUM BROMIDE AND VILANTEROL TRIFENATATE 200; 62.5; 25 UG/1; UG/1; UG/1
POWDER RESPIRATORY (INHALATION)
COMMUNITY
Start: 2022-09-20

## 2022-09-20 NOTE — PROGRESS NOTES
[address] [facility] Heart and Vascular [address] [place] [address] Electrophysiology  Progress Note  PATIENT: Maia Stock  MEDICAL RECORD NUMBER: [id number]  DATE OF SERVICE:  9/20/2022  ATTENDING ELECTROPHYSIOLOGIST: Cristina Tenorio MD   REFERRING PHYSICIAN: Laine Clifton DO  CHIEF COMPLAINT: Supraventricular tachycardia    HPI: This is a 62 y.o. female with a history of   Patient Active Problem List   Diagnosis    DJD (degenerative joint disease) of knee    Medial meniscus tear    Elbow arthritis    DDD (degenerative disc disease), lumbar    Elbow pain    Lateral epicondylitis    SVT (supraventricular tachycardia) (HCC)    Palpitations    Primary osteoarthritis of left knee    Mixed hyperlipidemia    Vitamin D deficiency    Bronchitis, acute, with bronchospasm    Exertional dyspnea    Primary osteoarthritis of right knee    Acute medial meniscal tear    Complex tear of lateral meniscus of right knee as current injury    Synovitis    Chronic obstructive pulmonary disease ([facility] [address]    Atypical chest pain    Symptomatic cholelithiasis    Gastroesophageal reflux disease without esophagitis    Chest pain   who presents to cardiac electrophysiology clinic for follow up of SVT. 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 She subsequently underwent slow pathway modification. Since her last office visit she began complaining of palpitations May of 2022 she then underwent a 14-day ZIO XT 8 that showed predominantly sinus rhythm with triggered events correlating with sinus. On September 15, 2022 she was seen in the ER at [facility] for palpitations and lightheadedness and was subsequently discharged she was to resume her Toprol on 09/16/2022. She was also seen in the emergency department for increased dyspnea on 09/20/2022 was diagnosed with a COPD exacerbation provided with duo nebs yet refused steroid treatments due to allergy.   Today she presents in sinus with complaints of palpitations last several hours occurring nearly weekly, last occurring on 9/15/2022 requiring the ER visit at [facility], described as a hard thump followed by feelings like her heart is stopping followed by the fast heart rate, this is associated with lightheadedness, ear pressure, this is different from the palpitations she felt with SVT prior to ablation. She continues to follow with pulmonology and will see them later this month. She does note dyspnea upon exertion, no chest pains, syncope, orthopnea or PND. She has just restarted the Toprol and does not notice a difference at this point.       Patient Active Problem List    Diagnosis Date Noted    Chest pain 05/12/2020    Symptomatic cholelithiasis     Gastroesophageal reflux disease without esophagitis     Chronic obstructive pulmonary disease ([address] 11/12/2018    Atypical chest pain 11/12/2018    Complex tear of lateral meniscus of right knee as current injury 05/05/2017    Synovitis 05/05/2017    Primary osteoarthritis of right knee 03/13/2017    Acute medial meniscal tear 03/13/2017    Exertional dyspnea 12/06/2016    Bronchitis, acute, with bronchospasm 11/07/2016    Mixed hyperlipidemia 10/25/2016    Vitamin D deficiency 10/25/2016    Primary osteoarthritis of left knee 05/24/2016    SVT (supraventricular tachycardia) (HCC)     Palpitations     Elbow pain 02/26/2015    Lateral epicondylitis 02/26/2015    DDD (degenerative disc disease), lumbar 11/03/2014    Elbow arthritis 08/26/2014    DJD (degenerative joint disease) of knee 07/18/2014    Medial meniscus tear 07/18/2014       Past Medical History:   Diagnosis Date    At high risk for deep venous thrombosis     Cancer (HCC)     cervical    COPD (chronic obstructive pulmonary disease) ([address]     early stage    DJD (degenerative joint disease) of knee 7/18/2014    GERD (gastroesophageal reflux disease)     H/O cardiovascular stress test 05/13/2020    Lexiscan    Headache     Hiatal hernia     Mixed hyperlipidemia 10/25/2016    SVT (supraventricular tachycardia) (HCC)     had episode of SVT 6 months ago and several small episode over last couple months       Family History   Problem Relation Age of Onset    High Blood Pressure Mother     High Blood Pressure Father        Social History     Tobacco Use    Smoking status: Every Day     Packs/day: 1.00     Years: 30.00     Pack years: 30.00     Types: Cigarettes    Smokeless tobacco: Never   Substance Use Topics    Alcohol use: Yes     Comment: rarely       Current Outpatient Medications   Medication Sig Dispense Refill    Fluticasone-Umeclidin-Vilant (TRELEGY ELLIPTA) 200-62.5-25 MCG/INH AEPB       metoprolol succinate (TOPROL XL) 25 MG extended release tablet Take 1 tablet by mouth daily Take 1 tablet daily 30 tablet 5    ibuprofen (ADVIL;MOTRIN) 600 MG tablet TAKE 1 TABLET BY MOUTH EVERY 6 HOURS AS NEEDED FOR PAIN 20 tablet 0    vitamin D (CHOLECALCIFEROL) 50 MCG (2000 UT) TABS tablet       pantoprazole (PROTONIX) 40 MG tablet Take 1 tablet by mouth every morning (before breakfast) 30 tablet 3    atorvastatin (LIPITOR) 20 MG tablet Take 1 tablet by mouth daily 30 tablet 3    PROAIR  (90 Base) MCG/ACT inhaler Inhale 2 puffs into the lungs every 6 hours as needed for Wheezing (or Cough spells) 8.5 g 3     No current facility-administered medications for this visit. Allergies   Allergen Reactions    Cortisone Other (See Comments)     Redness and breaking into a sweat. Medrol [Methylprednisolone]      Fever; redness; sweat    Bactrim [Sulfamethoxazole-Trimethoprim] Hives and Angioedema    Oxycodone Hcl     Oxycontin [Oxycodone Hcl] Itching    Penicillins      Cant remember reaction was from childhood    Sulfa Antibiotics     Wellbutrin [Bupropion]        ROS:   Constitutional: Negative for fever, activity change and appetite change. HENT: Negative for epistaxis. Eyes: Negative for diploplia, blurred vision.    Respiratory: Negative for cough, chest tightness, shortness of breath and wheezing. Cardiovascular: pertinent positives in HPI  Gastrointestinal: Negative for abdominal pain and blood in stool. All other review of systems are negative     PHYSICAL EXAM:   Vitals:    22 1325   BP: 124/82   Pulse: 74   Resp: 18   Weight: 193 lb 12.8 oz (87.9 kg)   Height: 5' 8\" (1.727 m)     Constitutional: Well-developed, no acute distress  Eyes: conjunctivae normal, no xanthelasma   Ears, Nose, Throat: oral mucosa moist, no cyanosis   CV: no JVD. Regular rate and rhythm. Normal S1S2 and no S3. No murmurs, rubs, or gallops. PMI is nondisplaced  Lungs: clear to auscultation bilaterally, normal respiratory effort without used of accessory muscles  Abdomen: soft, non-tender, bowel sounds present, no masses or hepatomegaly   Musculoskeletal: no digital clubbing, no edema. Well healed both groins and no hematoma. Skin: warm, no rashes     I have personally reviewed the laboratory, cardiac diagnostic and radiographic testing as outlined below:    Data:    Recent Labs     22   WBC 8.3   HGB 14.5   HCT 41.6        Recent Labs     22      K 3.6      CO2 21*   BUN 15   CREATININE 0.7   CALCIUM 9.6      Lab Results   Component Value Date/Time    MG 1.9 2022 09:25 AM     No results for input(s): TSH in the last 72 hours. No results for input(s): INR in the last 72 hours. EK22: sinus rate 74 bpm, MN 158ms, QRS 90ms, QTc 408ms. - Please see scan in Cardiology. Echocardiogram 20:   Findings      Left Ventricle   Ejection fraction is visually estimated at 55%. No regional wall motion   abnormalities seen. Mild left ventricular concentric hypertrophy noted. Left ventricle size is normal. Normal left ventricular diastolic filling   pattern for age. Right Ventricle   Normal right ventricle structure and function. Left Atrium   Normal left atrium.       Right Atrium   Normal right atrium. Mitral Valve   Structurally normal mitral valve. No evidence of mitral valve stenosis. Physiologic and/or trace mitral regurgitation is present. Tricuspid Valve   The tricuspid valve appears structurally normal.   Physiologic and/or trace tricuspid regurgitation. Aortic Valve   The aortic valve is trileaflet. No hemodynamically significant aortic   stenosis is present. No evidence of aortic valve regurgitation. Pulmonic Valve   Pulmonic valve is structurally normal. Physiologic and/or trace pulmonic   regurgitation present. Pericardial Effusion   No evidence for hemodynamically significant pericardial effusion. Aorta   Normal aortic root and ascending aorta. Miscellaneous   The inferior vena cava diameter is normal with normal respiratory   variation. Conclusions      Summary   Ejection fraction is visually estimated at 55%. No regional wall motion abnormalities seen. Mild left ventricular concentric hypertrophy noted. Normal right ventricle structure and function. Physiologic and/or trace mitral regurgitation is present.       Signature      ----------------------------------------------------------------   Electronically signed by Nelda Ledesma DO(Interpreting   physician) on 05/13/2020 11:03 AM   ----------------------------------------------------------------     M-Mode/2D Measurements & Calculations      LV Diastolic     LV Systolic Dimension:    AV Cusp Separation: 2.2 cmLA   Dimension: 4.8   3.5 cm                    Dimension: 3.2 cmAO Root   cm               LV Volume Diastolic:      Dimension: 3.3 cm   LV FS:27.1 %     107.2 ml   LV PW Diastolic: LV Volume Systolic: 54.3   1.1 cm           ml   Septum           LV EDV/LV EDV Index:      RV Diastolic Dimension: 2.2 cm   Diastolic: 1.2   789.1 mlLV ESV/LV ESV   cm               Index: 49.4 ml   CO: 4.79 l/min   EF Calculated: 53.9 %     LA volume/Index: 28.7 ml   LV Mass: 206.37   g (<1.0%),  and SVE Triplets were rare (<1.0%). Isolated VEs were rare (<1.0%,  45), VE Couplets were rare (<1.0%, 2), and VE Triplets were rare  (<1.0%, 2). Triggered events and reported symptoms were correlated  with sinus rhythm, sinus tachycardia, PACs, PVCs and SVTs. Outpatient Monitor 11/20/15: I have independently reviewed all of the ECGs and rhythm strips per above     Assessment/Plan: This is a 62 y.o. female with a history of   Patient Active Problem List   Diagnosis    DJD (degenerative joint disease) of knee    Medial meniscus tear    Elbow arthritis    DDD (degenerative disc disease), lumbar    Elbow pain    Lateral epicondylitis    SVT (supraventricular tachycardia) (HCC)    Palpitations    Primary osteoarthritis of left knee    Mixed hyperlipidemia    Vitamin D deficiency    Bronchitis, acute, with bronchospasm    Exertional dyspnea    Primary osteoarthritis of right knee    Acute medial meniscal tear    Complex tear of lateral meniscus of right knee as current injury    Synovitis    Chronic obstructive pulmonary disease (HCC)    Atypical chest pain    Symptomatic cholelithiasis    Gastroesophageal reflux disease without esophagitis    Chest pain     1. Supraventricular tachycardia  - Narrow complex with short RP.  - Terminated with IV Adenosine. 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  - Recurrent palpitation since the ablation however Zio in 05/30/22 showed triggered events mainly in sinus.   - Reports worsening within the past month. 2. Hyperlipidemia  - On Lipitor. 3. GERD  - On Protonix. 4. Vitamin D deficiency  - On Vitamin D3.    5. Cigarette smoking  - Advised cessation. 6. COPD     7. Steroid allergies     Recommendations:    1. Continue Toprol XL. 2. Advised to avoid caffeine, tobacco and ETOH intake. 3. 14 day Zio Xt pending results can consider ILR implant she was also to consider AliveCore usage.    4. Follow up in 6 months with Dr. Bullock Sender. Encouraged the patient to call the office for any questions or concerns. Thank you for allowing me to participate in your patient's care. Please call me if there are any questions or concerns. I have spent a total of 30 minutes with the patient and the family reviewing the above stated recommendations. And a total of >50% of that time involved face-to-face time providing counseling and or coordination of care with the other providers, preparation for the clinic visit, reviewing records/tests, counseling/education of the patient, ordering medications/tests/procedures, coordinating care, and documenting clinical information in the EHR.      Bard Peterson, APRN - CNP  Cardiac Electrophysiology  [facility]) [facility]  [facility] [place]: [facility]  2:57 PM  9/20/2022

## 2022-09-20 NOTE — PROGRESS NOTES
Patient was seen today and a ZIO XT  14 days monitor was placed. Monitor was ordered by Hemant ELISE. Monitor was applied and instructions given to patient. Patient stated understanding and gave verbalized readback.     Monitor company: [employer]T  14 days  Serial number : [id number]    Electronically signed by Yoan Esqueda MA on 9/20/2022 at 2:29 PM

## 2022-10-09 ENCOUNTER — APPOINTMENT (OUTPATIENT)
Dept: ULTRASOUND IMAGING | Age: 57
End: 2022-10-09
Payer: MEDICAID

## 2022-10-09 ENCOUNTER — HOSPITAL ENCOUNTER (EMERGENCY)
Age: 57
Discharge: HOME | End: 2022-10-09
Attending: EMERGENCY MEDICINE
Payer: MEDICAID

## 2022-10-09 ENCOUNTER — APPOINTMENT (OUTPATIENT)
Dept: CT IMAGING | Age: 57
End: 2022-10-09
Payer: MEDICAID

## 2022-10-09 VITALS
DIASTOLIC BLOOD PRESSURE: 92 MMHG | RESPIRATION RATE: 20 BRPM | HEART RATE: 87 BPM | SYSTOLIC BLOOD PRESSURE: 171 MMHG | TEMPERATURE: 97.2 F | OXYGEN SATURATION: 98 %

## 2022-10-09 DIAGNOSIS — R10.32 LEFT LOWER QUADRANT ABDOMINAL PAIN: Primary | ICD-10-CM

## 2022-10-09 LAB
ALBUMIN SERPL-MCNC: 4.4 G/DL (ref 3.5–5.2)
ALP BLD-CCNC: 112 U/L (ref 35–104)
ALT SERPL-CCNC: 29 U/L (ref 0–32)
ANION GAP SERPL CALCULATED.3IONS-SCNC: 11 MMOL/L (ref 7–16)
AST SERPL-CCNC: 22 U/L (ref 0–31)
BACTERIA: ABNORMAL /HPF
BASOPHILS ABSOLUTE: 0.02 E9/L (ref 0–0.2)
BASOPHILS RELATIVE PERCENT: 0.3 % (ref 0–2)
BILIRUB SERPL-MCNC: 0.4 MG/DL (ref 0–1.2)
BILIRUBIN URINE: NEGATIVE
BLOOD, URINE: NEGATIVE
BUN BLDV-MCNC: 11 MG/DL (ref 6–20)
CALCIUM SERPL-MCNC: 9.8 MG/DL (ref 8.6–10.2)
CHLORIDE BLD-SCNC: 97 MMOL/L (ref 98–107)
CLARITY: CLEAR
CO2: 26 MMOL/L (ref 22–29)
COLOR: YELLOW
CREAT SERPL-MCNC: 0.6 MG/DL (ref 0.5–1)
EOSINOPHILS ABSOLUTE: 0.06 E9/L (ref 0.05–0.5)
EOSINOPHILS RELATIVE PERCENT: 0.9 % (ref 0–6)
EPITHELIAL CELLS, UA: ABNORMAL /HPF
GFR AFRICAN AMERICAN: >60
GFR NON-AFRICAN AMERICAN: >60 ML/MIN/1.73
GLUCOSE BLD-MCNC: 100 MG/DL (ref 74–99)
GLUCOSE URINE: NEGATIVE MG/DL
HCT VFR BLD CALC: 46.6 % (ref 34–48)
HEMOGLOBIN: 15.5 G/DL (ref 11.5–15.5)
IMMATURE GRANULOCYTES #: 0.02 E9/L
IMMATURE GRANULOCYTES %: 0.3 % (ref 0–5)
KETONES, URINE: NEGATIVE MG/DL
LACTIC ACID, SEPSIS: 0.8 MMOL/L (ref 0.5–1.9)
LEUKOCYTE ESTERASE, URINE: NEGATIVE
LIPASE: 23 U/L (ref 13–60)
LYMPHOCYTES ABSOLUTE: 1.68 E9/L (ref 1.5–4)
LYMPHOCYTES RELATIVE PERCENT: 24.6 % (ref 20–42)
MCH RBC QN AUTO: 31.8 PG (ref 26–35)
MCHC RBC AUTO-ENTMCNC: 33.3 % (ref 32–34.5)
MCV RBC AUTO: 95.5 FL (ref 80–99.9)
MONOCYTES ABSOLUTE: 0.37 E9/L (ref 0.1–0.95)
MONOCYTES RELATIVE PERCENT: 5.4 % (ref 2–12)
NEUTROPHILS ABSOLUTE: 4.69 E9/L (ref 1.8–7.3)
NEUTROPHILS RELATIVE PERCENT: 68.5 % (ref 43–80)
NITRITE, URINE: NEGATIVE
PDW BLD-RTO: 12.6 FL (ref 11.5–15)
PH UA: 5.5 (ref 5–9)
PLATELET # BLD: 201 E9/L (ref 130–450)
PMV BLD AUTO: 11 FL (ref 7–12)
POTASSIUM REFLEX MAGNESIUM: 4.3 MMOL/L (ref 3.5–5)
PROTEIN UA: NEGATIVE MG/DL
RBC # BLD: 4.88 E12/L (ref 3.5–5.5)
RBC UA: ABNORMAL /HPF (ref 0–2)
SODIUM BLD-SCNC: 134 MMOL/L (ref 132–146)
SPECIFIC GRAVITY UA: >=1.03 (ref 1–1.03)
TOTAL PROTEIN: 7.8 G/DL (ref 6.4–8.3)
UROBILINOGEN, URINE: 0.2 E.U./DL
WBC # BLD: 6.8 E9/L (ref 4.5–11.5)
WBC UA: ABNORMAL /HPF (ref 0–5)

## 2022-10-09 PROCEDURE — 80053 COMPREHEN METABOLIC PANEL: CPT

## 2022-10-09 PROCEDURE — 81001 URINALYSIS AUTO W/SCOPE: CPT

## 2022-10-09 PROCEDURE — 96374 THER/PROPH/DIAG INJ IV PUSH: CPT

## 2022-10-09 PROCEDURE — 83690 ASSAY OF LIPASE: CPT

## 2022-10-09 PROCEDURE — 93976 VASCULAR STUDY: CPT

## 2022-10-09 PROCEDURE — 74176 CT ABD & PELVIS W/O CONTRAST: CPT

## 2022-10-09 PROCEDURE — 99284 EMERGENCY DEPT VISIT MOD MDM: CPT

## 2022-10-09 PROCEDURE — 6360000002 HC RX W HCPCS: Performed by: EMERGENCY MEDICINE

## 2022-10-09 PROCEDURE — 76830 TRANSVAGINAL US NON-OB: CPT

## 2022-10-09 PROCEDURE — 2580000003 HC RX 258: Performed by: EMERGENCY MEDICINE

## 2022-10-09 PROCEDURE — 96375 TX/PRO/DX INJ NEW DRUG ADDON: CPT

## 2022-10-09 PROCEDURE — 83605 ASSAY OF LACTIC ACID: CPT

## 2022-10-09 PROCEDURE — 85025 COMPLETE CBC W/AUTO DIFF WBC: CPT

## 2022-10-09 RX ORDER — 0.9 % SODIUM CHLORIDE 0.9 %
1000 INTRAVENOUS SOLUTION INTRAVENOUS ONCE
Status: COMPLETED | OUTPATIENT
Start: 2022-10-09 | End: 2022-10-09

## 2022-10-09 RX ORDER — NAPROXEN 250 MG/1
250 TABLET ORAL 2 TIMES DAILY
Qty: 14 TABLET | Refills: 0 | Status: SHIPPED | OUTPATIENT
Start: 2022-10-09 | End: 2022-10-16

## 2022-10-09 RX ORDER — KETOROLAC TROMETHAMINE 30 MG/ML
30 INJECTION, SOLUTION INTRAMUSCULAR; INTRAVENOUS ONCE
Status: COMPLETED | OUTPATIENT
Start: 2022-10-09 | End: 2022-10-09

## 2022-10-09 RX ORDER — FENTANYL CITRATE 0.05 MG/ML
50 INJECTION, SOLUTION INTRAMUSCULAR; INTRAVENOUS ONCE
Status: COMPLETED | OUTPATIENT
Start: 2022-10-09 | End: 2022-10-09

## 2022-10-09 RX ORDER — FENTANYL CITRATE 0.05 MG/ML
50 INJECTION, SOLUTION INTRAMUSCULAR; INTRAVENOUS ONCE
Status: DISCONTINUED | OUTPATIENT
Start: 2022-10-09 | End: 2022-10-09 | Stop reason: HOSPADM

## 2022-10-09 RX ADMIN — KETOROLAC TROMETHAMINE 30 MG: 30 INJECTION, SOLUTION INTRAMUSCULAR; INTRAVENOUS at 15:00

## 2022-10-09 RX ADMIN — SODIUM CHLORIDE 1000 ML: 9 INJECTION, SOLUTION INTRAVENOUS at 15:00

## 2022-10-09 RX ADMIN — FENTANYL CITRATE 50 MCG: 50 INJECTION INTRAMUSCULAR; INTRAVENOUS at 16:52

## 2022-10-09 RX ADMIN — SODIUM CHLORIDE 1000 ML: 9 INJECTION, SOLUTION INTRAVENOUS at 16:52

## 2022-10-09 ASSESSMENT — PAIN SCALES - GENERAL
PAINLEVEL_OUTOF10: 10
PAINLEVEL_OUTOF10: 8
PAINLEVEL_OUTOF10: 10

## 2022-10-09 ASSESSMENT — PAIN - FUNCTIONAL ASSESSMENT: PAIN_FUNCTIONAL_ASSESSMENT: 0-10

## 2022-10-09 ASSESSMENT — PAIN DESCRIPTION - LOCATION: LOCATION: ABDOMEN

## 2022-10-11 ENCOUNTER — OFFICE VISIT (OUTPATIENT)
Dept: FAMILY MEDICINE CLINIC | Age: 57
End: 2022-10-11
Payer: MEDICAID

## 2022-10-11 VITALS
HEART RATE: 74 BPM | RESPIRATION RATE: 18 BRPM | WEIGHT: 196 LBS | TEMPERATURE: 97.3 F | SYSTOLIC BLOOD PRESSURE: 108 MMHG | DIASTOLIC BLOOD PRESSURE: 64 MMHG | BODY MASS INDEX: 29.7 KG/M2 | HEIGHT: 68 IN | OXYGEN SATURATION: 98 %

## 2022-10-11 DIAGNOSIS — E78.2 MIXED HYPERLIPIDEMIA: ICD-10-CM

## 2022-10-11 DIAGNOSIS — E55.9 VITAMIN D DEFICIENCY: ICD-10-CM

## 2022-10-11 DIAGNOSIS — K57.92 DIVERTICULITIS: Primary | ICD-10-CM

## 2022-10-11 PROCEDURE — G8484 FLU IMMUNIZE NO ADMIN: HCPCS | Performed by: NURSE PRACTITIONER

## 2022-10-11 PROCEDURE — G8427 DOCREV CUR MEDS BY ELIG CLIN: HCPCS | Performed by: NURSE PRACTITIONER

## 2022-10-11 PROCEDURE — 99213 OFFICE O/P EST LOW 20 MIN: CPT | Performed by: NURSE PRACTITIONER

## 2022-10-11 PROCEDURE — G8417 CALC BMI ABV UP PARAM F/U: HCPCS | Performed by: NURSE PRACTITIONER

## 2022-10-11 PROCEDURE — 3017F COLORECTAL CA SCREEN DOC REV: CPT | Performed by: NURSE PRACTITIONER

## 2022-10-11 PROCEDURE — 4004F PT TOBACCO SCREEN RCVD TLK: CPT | Performed by: NURSE PRACTITIONER

## 2022-10-11 RX ORDER — METRONIDAZOLE 500 MG/1
500 TABLET ORAL 2 TIMES DAILY
Qty: 14 TABLET | Refills: 0 | Status: SHIPPED | OUTPATIENT
Start: 2022-10-11

## 2022-10-11 RX ORDER — CIPROFLOXACIN 500 MG/1
500 TABLET, FILM COATED ORAL 2 TIMES DAILY
Qty: 14 TABLET | Refills: 0 | Status: SHIPPED | OUTPATIENT
Start: 2022-10-11 | End: 2022-10-18

## 2022-10-11 RX ORDER — ATORVASTATIN CALCIUM 20 MG/1
20 TABLET, FILM COATED ORAL DAILY
Qty: 90 TABLET | Refills: 0 | Status: SHIPPED | OUTPATIENT
Start: 2022-10-11

## 2022-10-11 RX ORDER — CHOLECALCIFEROL (VITAMIN D3) 50 MCG
2000 TABLET ORAL DAILY
Qty: 90 TABLET | Refills: 0 | Status: SHIPPED | OUTPATIENT
Start: 2022-10-11

## 2022-10-11 SDOH — ECONOMIC STABILITY: FOOD INSECURITY: WITHIN THE PAST 12 MONTHS, YOU WORRIED THAT YOUR FOOD WOULD RUN OUT BEFORE YOU GOT MONEY TO BUY MORE.: NEVER TRUE

## 2022-10-11 SDOH — ECONOMIC STABILITY: FOOD INSECURITY: WITHIN THE PAST 12 MONTHS, THE FOOD YOU BOUGHT JUST DIDN'T LAST AND YOU DIDN'T HAVE MONEY TO GET MORE.: NEVER TRUE

## 2022-10-11 ASSESSMENT — ENCOUNTER SYMPTOMS
NAUSEA: 0
COUGH: 0
BACK PAIN: 1
ABDOMINAL PAIN: 1
VOMITING: 0
CONSTIPATION: 1
DIARRHEA: 0
SHORTNESS OF BREATH: 0
WHEEZING: 0

## 2022-10-11 ASSESSMENT — SOCIAL DETERMINANTS OF HEALTH (SDOH): HOW HARD IS IT FOR YOU TO PAY FOR THE VERY BASICS LIKE FOOD, HOUSING, MEDICAL CARE, AND HEATING?: NOT HARD AT ALL

## 2022-10-11 ASSESSMENT — PATIENT HEALTH QUESTIONNAIRE - PHQ9
1. LITTLE INTEREST OR PLEASURE IN DOING THINGS: 0
SUM OF ALL RESPONSES TO PHQ9 QUESTIONS 1 & 2: 0
SUM OF ALL RESPONSES TO PHQ QUESTIONS 1-9: 0
SUM OF ALL RESPONSES TO PHQ QUESTIONS 1-9: 0
2. FEELING DOWN, DEPRESSED OR HOPELESS: 0
SUM OF ALL RESPONSES TO PHQ QUESTIONS 1-9: 0
SUM OF ALL RESPONSES TO PHQ QUESTIONS 1-9: 0

## 2022-10-12 ENCOUNTER — TELEPHONE (OUTPATIENT)
Dept: NON INVASIVE DIAGNOSTICS | Age: 57
End: 2022-10-12

## 2022-10-12 DIAGNOSIS — R00.2 PALPITATIONS: ICD-10-CM

## 2022-10-12 ASSESSMENT — ENCOUNTER SYMPTOMS
ABDOMINAL PAIN: 1
EYE REDNESS: 0
NAUSEA: 0
VOMITING: 0
SHORTNESS OF BREATH: 0

## 2022-10-12 NOTE — ED PROVIDER NOTES
Chief complaint: Abdominal pain      HPI:  10/12/22, Time: 12:40 AM EDT    HPI           Tere Howard is a 62 y.o. female presenting to the ED for abdominal pain. The history is obtained from the patient as well as patient's medical record. Patient the patient began yesterday with  abdominal pain. Pain is located left lower quadrant some sharp stabbing. Mild in severity. Nothing severity. Nothing is worse. No treatment for the pain prior to arrival.  Has been constant since onset. She denies any fevers, chills, nausea, vomiting, dysuria, diarrhea or constipation. ROS:   Review of Systems   Constitutional:  Negative for chills and fatigue. HENT:  Negative for congestion. Eyes:  Negative for redness. Respiratory:  Negative for shortness of breath. Cardiovascular:  Negative for chest pain. Gastrointestinal:  Positive for abdominal pain. Negative for nausea and vomiting. Genitourinary:  Negative for dysuria. Musculoskeletal:  Negative for arthralgias. Skin:  Negative for rash. Neurological:  Negative for light-headedness. Psychiatric/Behavioral:  Negative for confusion. All other systems reviewed and are negative.    --------------------------------------------- PAST HISTORY ---------------------------------------------  Past Medical History:  has a past medical history of At high risk for deep venous thrombosis, Cancer (HCC), COPD (chronic obstructive pulmonary disease) ([facility] [address] DJD (degenerative joint disease) of knee, GERD (gastroesophageal reflux disease), H/O cardiovascular stress test, Headache, Hiatal hernia, Mixed hyperlipidemia, and SVT (supraventricular tachycardia) ([facility] [address] Past Surgical History:  has a past surgical history that includes back surgery; knee surgery (Left); Tonsillectomy; Tubal ligation; Endometrial ablation; Knee arthroscopy (Right, 05/05/2017); Endometrial ablation; bone biopsy; Endoscopy, colon, diagnostic;  Cholecystectomy, laparoscopic (N/A, 3/6/2020); Upper gastrointestinal endoscopy (N/A, 3/6/2020); and ablation of dysrhythmic focus (10/06/2020). Social History:  reports that she has been smoking cigarettes. She has a 30.00 pack-year smoking history. She has never used smokeless tobacco. She reports current alcohol use. She reports that she does not use drugs. Family History: family history includes High Blood Pressure in her father and mother. The patients home medications have been reviewed. Allergies: Cortisone, Medrol [methylprednisolone], Bactrim [sulfamethoxazole-trimethoprim], Oxycodone hcl, Oxycontin [oxycodone hcl], Penicillins, Sulfa antibiotics, and Wellbutrin [bupropion]    ---------------------------------------------------PHYSICAL EXAM--------------------------------------      Constitutional/General: Alert and oriented x3, well appearing, non toxic in NAD  Head: Normocephalic and atraumatic  Mouth: Oropharynx clear, handling secretions, no trismus  Neck: Supple, full ROM,  Pulmonary: Lungs clear to auscultation bilaterally, no wheezes, rales, or rhonchi. Not in respiratory distress  Cardiovascular:  Regular rate. Regular rhythm. No murmurs  Chest: no chest wall tenderness  Abdomen: Soft. Mildly tender in left lower quadrant, no rebound, rigidity or guarding  Musculoskeletal: Moves all extremities x 4. Warm and well perfused, no clubbing, cyanosis, or edema. Capillary refill <3 seconds  Skin: warm and dry. No rashes. Neurologic: GCS 15, no gross focal neurologic deficits  Psych: Normal Affect    -------------------------------------------------- RESULTS -------------------------------------------------  I have personally reviewed all laboratory and imaging results for this patient. Results are listed below.      LABS:  Results for orders placed or performed during the hospital encounter of 10/09/22   CBC with Auto Differential   Result Value Ref Range    WBC 6.8 4.5 - 11.5 E9/L    RBC 4.88 3.50 - 5.50 E12/L    Hemoglobin 15.5 11.5 - 15.5 g/dL    Hematocrit 46.6 34.0 - 48.0 %    MCV 95.5 80.0 - 99.9 fL    MCH 31.8 26.0 - 35.0 pg    MCHC 33.3 32.0 - 34.5 %    RDW 12.6 11.5 - 15.0 fL    Platelets 817 581 - 734 E9/L    MPV 11.0 7.0 - 12.0 fL    Neutrophils % 68.5 43.0 - 80.0 %    Immature Granulocytes % 0.3 0.0 - 5.0 %    Lymphocytes % 24.6 20.0 - 42.0 %    Monocytes % 5.4 2.0 - 12.0 %    Eosinophils % 0.9 0.0 - 6.0 %    Basophils % 0.3 0.0 - 2.0 %    Neutrophils Absolute 4.69 1.80 - 7.30 E9/L    Immature Granulocytes # 0.02 E9/L    Lymphocytes Absolute 1.68 1.50 - 4.00 E9/L    Monocytes Absolute 0.37 0.10 - 0.95 E9/L    Eosinophils Absolute 0.06 0.05 - 0.50 E9/L    Basophils Absolute 0.02 0.00 - 0.20 E9/L   Comprehensive Metabolic Panel w/ Reflex to MG   Result Value Ref Range    Sodium 134 132 - 146 mmol/L    Potassium reflex Magnesium 4.3 3.5 - 5.0 mmol/L    Chloride 97 (L) 98 - 107 mmol/L    CO2 26 22 - 29 mmol/L    Anion Gap 11 7 - 16 mmol/L    Glucose 100 (H) 74 - 99 mg/dL    BUN 11 6 - 20 mg/dL    Creatinine 0.6 0.5 - 1.0 mg/dL    GFR Non-African American >60 >=60 mL/min/1.73    GFR African American >60     Calcium 9.8 8.6 - 10.2 mg/dL    Total Protein 7.8 6.4 - 8.3 g/dL    Albumin 4.4 3.5 - 5.2 g/dL    Total Bilirubin 0.4 0.0 - 1.2 mg/dL    Alkaline Phosphatase 112 (H) 35 - 104 U/L    ALT 29 0 - 32 U/L    AST 22 0 - 31 U/L   Lipase   Result Value Ref Range    Lipase 23 13 - 60 U/L   Lactate, Sepsis   Result Value Ref Range    Lactic Acid, Sepsis 0.8 0.5 - 1.9 mmol/L   Urinalysis with Microscopic   Result Value Ref Range    Color, UA Yellow Straw/Yellow    Clarity, UA Clear Clear    Glucose, Ur Negative Negative mg/dL    Bilirubin Urine Negative Negative    Ketones, Urine Negative Negative mg/dL    Specific Gravity, UA >=1.030 1.005 - 1.030    Blood, Urine Negative Negative    pH, UA 5.5 5.0 - 9.0    Protein, UA Negative Negative mg/dL    Urobilinogen, Urine 0.2 <2.0 E.U./dL    Nitrite, Urine Negative Negative    Leukocyte Esterase, Urine Negative Negative    WBC, UA 0-1 0 - 5 /HPF    RBC, UA NONE 0 - 2 /HPF    Epithelial Cells, UA RARE /HPF    Bacteria, UA RARE (A) None Seen /HPF       RADIOLOGY:  Interpreted by Radiologist.  [address] [facility] [address] DUP ABD PEL RETRO SCROT LIMITED   Final Result   1. Small uterine fibroid. Otherwise normal appearance of the uterus and   endometrium. 2.  Normal appearance of the bilateral ovaries. There are no adnexal masses. Normal Doppler flow within the ovaries. US NON OB TRANSVAGINAL   Final Result   1. Small uterine fibroid. Otherwise normal appearance of the uterus and   endometrium. 2.  Normal appearance of the bilateral ovaries. There are no adnexal masses. Normal Doppler flow within the ovaries. CT ABDOMEN PELVIS WO CONTRAST Additional Contrast? None   Final Result   There is no acute inflammation, bowel obstruction or obstructive uropathy. Postop changes in the lumbar spine.                 ------------------------- NURSING NOTES AND VITALS REVIEWED ---------------------------   The nursing notes within the ED encounter and vital signs as below have been reviewed by myself. BP (!) 171/92   Pulse 87   Temp 97.2 °F (36.2 °C) (Infrared)   Resp 20   SpO2 98%   Oxygen Saturation Interpretation: Normal    The patients available past medical records and past encounters were reviewed. ------------------------------ ED COURSE/MEDICAL DECISION MAKING----------------------  Medications   0.9 % sodium chloride bolus (0 mLs IntraVENous Stopped 10/9/22 1652)   ketorolac (TORADOL) injection 30 mg (30 mg IntraVENous Given 10/9/22 1500)   fentaNYL (SUBLIMAZE) injection 50 mcg (50 mcg IntraVENous Given 10/9/22 1652)   0.9 % sodium chloride bolus (0 mLs IntraVENous Stopped 10/9/22 1932)             Medical Decision Making:   I, Dr. Devyn Moore am the primary physician of record. Tristian Muller is a 62 y.o. female who presents to the ED for abdominal pain.   The patient did arrive with blood computerized transcription errors may be present          Ana Laura Oliveira,   10/12/22 0719

## 2022-10-12 NOTE — TELEPHONE ENCOUNTER
----- Message from PENNIE Geller CNP sent at 10/12/2022  2:33 PM EDT -----  Please let the patient known the triggered events correlated with sinus, sinus tachycardia and PACs,  and she should consider utilizing the alive Core monitor.  Thanks.    ----- Message -----  From: Urban Ribeiro MA  Sent: 10/12/2022   7:51 AM EDT  To: PENNIE Geller CNP

## 2022-11-02 DIAGNOSIS — K21.9 GASTROESOPHAGEAL REFLUX DISEASE WITHOUT ESOPHAGITIS: ICD-10-CM

## 2022-11-02 DIAGNOSIS — K80.20 CALCULUS OF GALLBLADDER WITHOUT CHOLECYSTITIS WITHOUT OBSTRUCTION: ICD-10-CM

## 2022-11-02 RX ORDER — PANTOPRAZOLE SODIUM 40 MG/1
40 TABLET, DELAYED RELEASE ORAL
Qty: 30 TABLET | Refills: 3 | Status: SHIPPED | OUTPATIENT
Start: 2022-11-02

## 2022-12-21 ENCOUNTER — COMMUNITY OUTREACH (OUTPATIENT)
Dept: FAMILY MEDICINE CLINIC | Age: 57
End: 2022-12-21

## 2023-01-13 ENCOUNTER — PREP FOR PROCEDURE (OUTPATIENT)
Dept: PAIN MANAGEMENT | Age: 58
End: 2023-01-13

## 2023-01-13 ENCOUNTER — OFFICE VISIT (OUTPATIENT)
Dept: PAIN MANAGEMENT | Age: 58
End: 2023-01-13
Payer: MEDICAID

## 2023-01-13 VITALS
HEIGHT: 68 IN | WEIGHT: 196 LBS | BODY MASS INDEX: 29.7 KG/M2 | TEMPERATURE: 97.1 F | RESPIRATION RATE: 18 BRPM | DIASTOLIC BLOOD PRESSURE: 80 MMHG | SYSTOLIC BLOOD PRESSURE: 122 MMHG

## 2023-01-13 DIAGNOSIS — F17.200 SMOKING: ICD-10-CM

## 2023-01-13 DIAGNOSIS — M51.36 DDD (DEGENERATIVE DISC DISEASE), LUMBAR: ICD-10-CM

## 2023-01-13 DIAGNOSIS — M47.817 LUMBOSACRAL SPONDYLOSIS WITHOUT MYELOPATHY: ICD-10-CM

## 2023-01-13 DIAGNOSIS — M47.817 LUMBOSACRAL SPONDYLOSIS WITHOUT MYELOPATHY: Primary | ICD-10-CM

## 2023-01-13 DIAGNOSIS — M53.3 SACROILIAC DYSFUNCTION: ICD-10-CM

## 2023-01-13 DIAGNOSIS — M96.1 POSTLAMINECTOMY SYNDROME, LUMBAR: Primary | ICD-10-CM

## 2023-01-13 PROCEDURE — 4004F PT TOBACCO SCREEN RCVD TLK: CPT | Performed by: ANESTHESIOLOGY

## 2023-01-13 PROCEDURE — 99214 OFFICE O/P EST MOD 30 MIN: CPT | Performed by: ANESTHESIOLOGY

## 2023-01-13 PROCEDURE — G8417 CALC BMI ABV UP PARAM F/U: HCPCS | Performed by: ANESTHESIOLOGY

## 2023-01-13 PROCEDURE — 3017F COLORECTAL CA SCREEN DOC REV: CPT | Performed by: ANESTHESIOLOGY

## 2023-01-13 PROCEDURE — 99213 OFFICE O/P EST LOW 20 MIN: CPT | Performed by: ANESTHESIOLOGY

## 2023-01-13 PROCEDURE — G8484 FLU IMMUNIZE NO ADMIN: HCPCS | Performed by: ANESTHESIOLOGY

## 2023-01-13 PROCEDURE — G8427 DOCREV CUR MEDS BY ELIG CLIN: HCPCS | Performed by: ANESTHESIOLOGY

## 2023-01-13 RX ORDER — SODIUM CHLORIDE 9 MG/ML
INJECTION, SOLUTION INTRAVENOUS PRN
Status: CANCELLED | OUTPATIENT
Start: 2023-01-13

## 2023-01-13 RX ORDER — GABAPENTIN 300 MG/1
CAPSULE ORAL
COMMUNITY
Start: 2022-10-28

## 2023-01-13 RX ORDER — BUDESONIDE AND FORMOTEROL FUMARATE DIHYDRATE 160; 4.5 UG/1; UG/1
AEROSOL RESPIRATORY (INHALATION)
COMMUNITY
Start: 2022-11-22

## 2023-01-13 RX ORDER — SODIUM CHLORIDE 0.9 % (FLUSH) 0.9 %
5-40 SYRINGE (ML) INJECTION PRN
Status: CANCELLED | OUTPATIENT
Start: 2023-01-13

## 2023-01-13 RX ORDER — SODIUM CHLORIDE 0.9 % (FLUSH) 0.9 %
5-40 SYRINGE (ML) INJECTION EVERY 12 HOURS SCHEDULED
Status: CANCELLED | OUTPATIENT
Start: 2023-01-13

## 2023-01-13 NOTE — H&P (VIEW-ONLY)
[address]  [address] [facility], [address] [place] [address] Luke  [phone]    Follow up Note      Jorge River     Date of Visit:  1/13/2023    CC:  Patient presents for follow up   Chief Complaint   Patient presents with    Back Pain       HPI:  Chronic low back pain. Prior L4-L5-S1 interbody fusion > 10 yrs ago. Did well until recently. Left-sided low back pain- predominantly axial in nature. Has been evaluated for that she is undergone CT abdomen and CT lumbar spine. Has been evaluated by NSG on 6/7/2022- no surgical indication. Pain causes functional limitations/ limits Adl's : Yes      Nursing notes and details of the pain history reviewed. Please see intake notes for details. Has been evaluated by [facility] spine - recommended facet interventions. Previous treatments:   Physical Therapy/ HEP : yes      Medications: - NSAID's : yes                         - Adjuvants or Others : yes      Lumbar spine Surgeries: yes, L4-5-S1 fusion     She has not been on anticoagulation medications      She has not been on herbal supplements. H/O Smoking: Yes  H/O alcohol abuse : no  H/O Illicit drug use : no     Employment: disability     Imaging:     XR LUMBAR MOTION 4V AP/LAT/ FLEX/EXT10/28/2022:  IMPRESSION:     Satisfactory postsurgical changes lower lumbar spine which is otherwise   unremarkable. Anatomic Variant: None. L4-5 is considered the level of the iliac crest   and assume there are 5 lumbar-type vertebrae. MRI of LS Spine: 5/31/2022:     FINDINGS:   BONES/ALIGNMENT: There is normal alignment of the spine. The vertebral body   heights are maintained. The bone marrow signal appears unremarkable. Again   seen are postsurgical changes from prior posterior fusion instrumentation at   L4 through S1 with bilateral transpedicular screws and parallel interlocking   rods. Inter disc spacer at L4-5 and inter disc screw at L5-S1 again noted.        SPINAL CORD: The conus terminates normally. SOFT TISSUES: No abnormal enhancement is seen of the lumbar spine. No   paraspinal mass identified. L1-L2: There is no significant disc protrusion, spinal canal stenosis or   neural foraminal narrowing. L2-L3: There is no significant disc protrusion, spinal canal stenosis or   neural foraminal narrowing. L3-L4: Disc bulge and facet arthropathy contributing to mild bilateral   foraminal narrowing. No significant canal stenosis. L4-L5: There is no significant disc protrusion, spinal canal stenosis or   neural foraminal narrowing. L5-S1: There is no significant disc protrusion, spinal canal stenosis or   neural foraminal narrowing. Impression   1. Satisfactory alignment of the lumbar spine status post L4-S1 posterior   fusion instrumentation. 2. No significant canal or foraminal stenosis within the lumbar spine. X-ray LS spine flex ext: 3/3/2022:  Impression   Posterior fusion of the L4, L5, and S1 levels with satisfactory alignment and   no evidence of inducible instability in flexion or extension views. Narrowing of the neural foramina bilaterally at the L4/L5 and L5/S1 levels. Minimal degenerative changes seen within the sacroiliac joints bilaterally. CT lumbar spine: 1/11/2022:  FINDINGS:   There are bilateral pedicle screws at L4, L5, and S1 with intervening   posterior fusion bars. There is threaded fusion screw across the L5-S1   intervertebral disc. There is no evidence of acute fracture. Vertebral   alignment is anatomic. There are no compression deformities. There is right   hemilaminotomy at L4 and left hemilaminotomy at L5. There is bilateral facet   hypertrophy at L3-4 and L5-S1 as well as left facet hypertrophy at L4-5. The   paravertebral soft tissue structures are unremarkable. There is no gross   evidence of central canal stenosis or neural foraminal narrowing.   There is a   left adrenal adenoma. The patient is status post cholecystectomy. There is   arteriosclerosis without abdominal aortic aneurysm. Impression   1. No acute fracture or subluxation. 2. Degenerative and postoperative changes in the lumbar spine without   evidence of central canal stenosis or definite neural foraminal narrowing. OARRS report[de-identified] reviewed. Past Medical History:   Diagnosis Date    At high risk for deep venous thrombosis     Cancer (HCC)     cervical    COPD (chronic obstructive pulmonary disease) (HCC)     early stage    DJD (degenerative joint disease) of knee 7/18/2014    GERD (gastroesophageal reflux disease)     H/O cardiovascular stress test 05/13/2020    Lexiscan    Headache     Hiatal hernia     Mixed hyperlipidemia 10/25/2016    SVT (supraventricular tachycardia) (HCC)     had episode of SVT 6 months ago and several small episode over last couple months       Past Surgical History:   Procedure Laterality Date    ABLATION OF DYSRHYTHMIC FOCUS  10/06/2020    Succesful Ablation AVN Reentry Tachycardia    (Dr. Chandler High)    BACK SURGERY      lumbar pins & screws    BONE BIOPSY      CHOLECYSTECTOMY, LAPAROSCOPIC N/A 3/6/2020    CHOLECYSTECTOMY LAPAROSCOPIC performed by Rose Wooten MD at [address] [facility], COLON, DIAGNOSTIC      KNEE ARTHROSCOPY Right 05/05/2017    KNEE SURGERY Left     arthroscopy    TONSILLECTOMY      TUBAL LIGATION      UPPER GASTROINTESTINAL ENDOSCOPY N/A 3/6/2020    EGD ESOPHAGOGASTRODUODENOSCOPY performed by Rose Wooten MD at [address]       Prior to Admission medications    Medication Sig Start Date End Date Taking?  Authorizing Provider   gabapentin (NEURONTIN) 300 MG capsule  10/28/22  Yes Historical Provider, MD   SYMBICORT 160-4.5 MCG/ACT AERO  11/22/22  Yes Historical Provider, MD   pantoprazole (PROTONIX) 40 MG tablet TAKE 1 TABLET BY MOUTH EVERY MORNING (BEFORE BREAKFAST) 11/2/22  Yes Payam Arthur, APRN - CNP   vitamin D (CHOLECALCIFEROL) 50 MCG (2000 UT) TABS tablet Take 1 tablet by mouth daily 10/11/22  Yes PENNIE Hancock CNP   atorvastatin (LIPITOR) 20 MG tablet Take 1 tablet by mouth daily 10/11/22  Yes PENNIE Hancock CNP   Fluticasone-Umeclidin-Vilant (Cali Bullion) 235-78.6-26 MCG/INH AEPB  9/20/22  Yes Historical Provider, MD   metoprolol succinate (TOPROL XL) 25 MG extended release tablet Take 1 tablet by mouth daily Take 1 tablet daily 9/16/22  Yes Sylvia Toledo MD   PROAIR  (90 Base) MCG/ACT inhaler Inhale 2 puffs into the lungs every 6 hours as needed for Wheezing (or Cough spells) 2/23/22  Yes Jewel Flakes, DO   metroNIDAZOLE (FLAGYL) 500 MG tablet Take 1 tablet by mouth 2 times daily  Patient not taking: Reported on 1/13/2023 10/11/22   PENNIE Hancock CNP   naproxen (NAPROSYN) 250 MG tablet Take 1 tablet by mouth 2 times daily for 7 days 10/9/22 10/16/22  Mila End, DO   ibuprofen (ADVIL;MOTRIN) 600 MG tablet TAKE 1 TABLET BY MOUTH EVERY 6 HOURS AS NEEDED FOR PAIN 7/25/22 10/11/22  Jewel Flakes, DO       Allergies   Allergen Reactions    Cortisone Other (See Comments)     Redness and breaking into a sweat.     Medrol [Methylprednisolone]      Fever; redness; sweat    Bactrim [Sulfamethoxazole-Trimethoprim] Hives and Angioedema    Oxycodone Hcl     Oxycontin [Oxycodone Hcl] Itching    Penicillins      Cant remember reaction was from childhood    Sulfa Antibiotics     Wellbutrin [Bupropion]        Social History     Socioeconomic History    Marital status:      Spouse name: Not on file    Number of children: Not on file    Years of education: Not on file    Highest education level: Not on file   Occupational History    Not on file   Tobacco Use    Smoking status: Every Day     Packs/day: 1.00     Years: 30.00     Pack years: 30.00     Types: Cigarettes    Smokeless tobacco: Never   Vaping Use    Vaping Use: Never used   Substance and Sexual Activity    Alcohol use: Yes     Comment: rarely    Drug use: No    Sexual activity: Yes   Other Topics Concern    Not on file   Social History Narrative    Not on file     Social Determinants of Health     Financial Resource Strain: Low Risk     Difficulty of Paying Living Expenses: Not hard at all   Food Insecurity: No Food Insecurity    Worried About Running Out of Food in the Last Year: Never true    Ran Out of Food in the Last Year: Never true   Transportation Needs: Not on file   Physical Activity: Not on file   Stress: Not on file   Social Connections: Not on file   Intimate Partner Violence: Not on file   Housing Stability: Not on file       Family History   Problem Relation Age of Onset    High Blood Pressure Mother     High Blood Pressure Father        REVIEW OF SYSTEMS:     Christina Heart denies fever/chills, chest pain, shortness of breath, new bowel or bladder complaints. All other review of systems was negative. PHYSICAL EXAMINATION:      /80   Temp 97.1 °F (36.2 °C) (Infrared)   Resp 18   Ht 5' 8\" (1.727 m)   Wt 196 lb (88.9 kg)   BMI 29.80 kg/m²   General:       General appearance:  Pleasant and well-hydrated, in no distress and A & O x 3  Build:Overweight  Function: Rises from seated position easily and Moves about room without difficulty     HEENT:     Head:normocephalic, atraumatic     Lungs:     Breathing:normal breathing pattern      CVS:     RRR     Abdomen:     Shape:non-distended and normal     Cervical spine:     Inspection:normal     Thoracic spine:                Spine inspection:normal   Palpation:No tenderness over the midline and paraspinal area, bilaterally  Range of motion:normal in flexion, extension rotation bilateral and is not painful.      Lumbar spine:     Spine inspection: Scar from the prior surgery noted healed well  Palpation: Tenderness paravertebral muscles Yes   Range of motion: Decreased, flexion Decreased, Lateral bending, extension and rotation bilaterally reduced is somewhat painful. Left lumbar facet tenderness + above the fusion. Sacroiliac joint tenderness Yes left  SLR : negative bilaterally      Musculoskeletal:     Trigger points no     Extremities:     Tremors:None bilaterally upper and lower  Edema:no     Neurological:     Sensory: Normal to light touch      Motor:                   Right Quadriceps 5/5          Left Quadriceps 5/5           Right Gastrocnemius 5/5    Left Gastrocnemius 5/5  Right Ant Tibialis 5/5  Left Ant Tibialis 5/5     Dermatology:     Skin:no rashes or lesions noted    Assessment/Plan:   Diagnosis Orders   1. Postlaminectomy syndrome, lumbar        2. Lumbosacral spondylosis without myelopathy        3. DDD (degenerative disc disease), lumbar        4. Sacroiliac dysfunction        5. Smoking          62 y.o.  female with H/o previous lumbar spine fusion surgery at L4 L5-S1 > 10 years ago. H/o low back pain. Denies lower extremity radiation or weakness or numbness. Recent CT lumbar spine/ MRI of LS spine reviewed and discussed with the patient. CT abdomen findings reviewed: Incidental diverticulosis, Has been evaluated by Gen surgery- treated with antibiotics- improved abdominal pain. Does not have abdominal pain now. Has also been evaluated by NSG had MRI. No surgical interventions. Has tenderness over the left lumbar facet and left SIJ. Has been evaluated by [facility] spine and recommended facet interventions. Recent Xray / MRI/ pertinent images/ consult notes reviewed. Recommend:    LEFT  LUMBAR MEDIAL BRANCH NERVE BLOCK UNDER FLUOROSCOPIC GUIDANCE AT L1-2, L2-3, L3-4 facets ( ABOVE THE LEVEL OF FUSION). If short term relief will do RFA. Moderate sedation due to H/o anxiety of needles. Counseling :Patient encouraged to stay active and to continue Regular home exercise program as tolerated - stretching / strengthening.      Smoking cessation counseling : yes      Treatment plan discussed with the patient including medication and procedure side effects.     Controlled Substances Monitoring: OARRS reviewed.     Casa Lobo MD    NOTE: The above documentation was prepared using voice recognition software.  Every attempt was made to ensure accuracy but there may be spelling, grammatical, and contextual errors.

## 2023-01-13 NOTE — PROGRESS NOTES
Do you currently have any of the following:    Fever: No  Headache:  No  Cough: No  Shortness of breath: No  Exposed to anyone with these symptoms: No                                                                                                                Martin River presents to the [facility] on 1/13/2023. Sherie Serna is complaining of pain in her lower back. The pain is constant. The pain is described as aching, burning, and penetrating. Pain is rated on her best day at a 4, on her worst day at a 10, and on average at a 7 on the VAS scale. She took her last dose of Neurontin and Motrin yesterday. Sherie Serna does not have issues with constipation. Any procedures since your last visit: No,     She is  on NSAIDS and  is not on anticoagulation medications to include none and is managed by NA. Pacemaker or defibrillator: No Physician managing device is NA. Medication Contract and Consent for Opioid Use Documents Filed        No documents found                       /80   Temp 97.1 °F (36.2 °C) (Infrared)   Resp 18   Ht 5' 8\" (1.727 m)   Wt 196 lb (88.9 kg)   BMI 29.80 kg/m²      No LMP recorded. Patient has had an ablation.

## 2023-01-13 NOTE — PROGRESS NOTES
[address]  [address] [facility], [address] [place] [address] Luke  [phone]    Follow up Note      Rosanne River     Date of Visit:  1/13/2023    CC:  Patient presents for follow up   Chief Complaint   Patient presents with    Back Pain       HPI:  Chronic low back pain. Prior L4-L5-S1 interbody fusion > 10 yrs ago. Did well until recently. Left-sided low back pain- predominantly axial in nature. Has been evaluated for that she is undergone CT abdomen and CT lumbar spine. Has been evaluated by NSG on 6/7/2022- no surgical indication. Pain causes functional limitations/ limits Adl's : Yes      Nursing notes and details of the pain history reviewed. Please see intake notes for details. Has been evaluated by [facility] spine - recommended facet interventions. Previous treatments:   Physical Therapy/ HEP : yes      Medications: - NSAID's : yes                         - Adjuvants or Others : yes      Lumbar spine Surgeries: yes, L4-5-S1 fusion     She has not been on anticoagulation medications      She has not been on herbal supplements. H/O Smoking: Yes  H/O alcohol abuse : no  H/O Illicit drug use : no     Employment: disability     Imaging:     XR LUMBAR MOTION 4V AP/LAT/ FLEX/EXT10/28/2022:  IMPRESSION:     Satisfactory postsurgical changes lower lumbar spine which is otherwise   unremarkable. Anatomic Variant: None. L4-5 is considered the level of the iliac crest   and assume there are 5 lumbar-type vertebrae. MRI of LS Spine: 5/31/2022:     FINDINGS:   BONES/ALIGNMENT: There is normal alignment of the spine. The vertebral body   heights are maintained. The bone marrow signal appears unremarkable. Again   seen are postsurgical changes from prior posterior fusion instrumentation at   L4 through S1 with bilateral transpedicular screws and parallel interlocking   rods. Inter disc spacer at L4-5 and inter disc screw at L5-S1 again noted.        SPINAL CORD: The conus terminates normally. SOFT TISSUES: No abnormal enhancement is seen of the lumbar spine. No   paraspinal mass identified. L1-L2: There is no significant disc protrusion, spinal canal stenosis or   neural foraminal narrowing. L2-L3: There is no significant disc protrusion, spinal canal stenosis or   neural foraminal narrowing. L3-L4: Disc bulge and facet arthropathy contributing to mild bilateral   foraminal narrowing. No significant canal stenosis. L4-L5: There is no significant disc protrusion, spinal canal stenosis or   neural foraminal narrowing. L5-S1: There is no significant disc protrusion, spinal canal stenosis or   neural foraminal narrowing. Impression   1. Satisfactory alignment of the lumbar spine status post L4-S1 posterior   fusion instrumentation. 2. No significant canal or foraminal stenosis within the lumbar spine. X-ray LS spine flex ext: 3/3/2022:  Impression   Posterior fusion of the L4, L5, and S1 levels with satisfactory alignment and   no evidence of inducible instability in flexion or extension views. Narrowing of the neural foramina bilaterally at the L4/L5 and L5/S1 levels. Minimal degenerative changes seen within the sacroiliac joints bilaterally. CT lumbar spine: 1/11/2022:  FINDINGS:   There are bilateral pedicle screws at L4, L5, and S1 with intervening   posterior fusion bars. There is threaded fusion screw across the L5-S1   intervertebral disc. There is no evidence of acute fracture. Vertebral   alignment is anatomic. There are no compression deformities. There is right   hemilaminotomy at L4 and left hemilaminotomy at L5. There is bilateral facet   hypertrophy at L3-4 and L5-S1 as well as left facet hypertrophy at L4-5. The   paravertebral soft tissue structures are unremarkable. There is no gross   evidence of central canal stenosis or neural foraminal narrowing.   There is a   left adrenal adenoma. The patient is status post cholecystectomy. There is   arteriosclerosis without abdominal aortic aneurysm. Impression   1. No acute fracture or subluxation. 2. Degenerative and postoperative changes in the lumbar spine without   evidence of central canal stenosis or definite neural foraminal narrowing. OARRS report[de-identified] reviewed. Past Medical History:   Diagnosis Date    At high risk for deep venous thrombosis     Cancer (HCC)     cervical    COPD (chronic obstructive pulmonary disease) (HCC)     early stage    DJD (degenerative joint disease) of knee 7/18/2014    GERD (gastroesophageal reflux disease)     H/O cardiovascular stress test 05/13/2020    Lexiscan    Headache     Hiatal hernia     Mixed hyperlipidemia 10/25/2016    SVT (supraventricular tachycardia) (HCC)     had episode of SVT 6 months ago and several small episode over last couple months       Past Surgical History:   Procedure Laterality Date    ABLATION OF DYSRHYTHMIC FOCUS  10/06/2020    Succesful Ablation AVN Reentry Tachycardia    (Dr. Avelino Smith)    BACK SURGERY      lumbar pins & screws    BONE BIOPSY      CHOLECYSTECTOMY, LAPAROSCOPIC N/A 3/6/2020    CHOLECYSTECTOMY LAPAROSCOPIC performed by Jovan Lee MD at [address] [facility], [place], DIAGNOSTIC      KNEE ARTHROSCOPY Right 05/05/2017    KNEE SURGERY Left     arthroscopy    TONSILLECTOMY      TUBAL LIGATION      UPPER GASTROINTESTINAL ENDOSCOPY N/A 3/6/2020    EGD ESOPHAGOGASTRODUODENOSCOPY performed by Jovan Lee MD at [address]       Prior to Admission medications    Medication Sig Start Date End Date Taking?  Authorizing Provider   gabapentin (NEURONTIN) 300 MG capsule  10/28/22  Yes Historical Provider, MD   SYMBICORT 160-4.5 MCG/ACT AERO  11/22/22  Yes Historical Provider, MD   pantoprazole (PROTONIX) 40 MG tablet TAKE 1 TABLET BY MOUTH EVERY MORNING (BEFORE BREAKFAST) 11/2/22  Yes PENNIE Worrell - CNP   vitamin D (CHOLECALCIFEROL) 50 MCG (2000 UT) TABS tablet Take 1 tablet by mouth daily 10/11/22  Yes PENNIE Everett CNP   atorvastatin (LIPITOR) 20 MG tablet Take 1 tablet by mouth daily 10/11/22  Yes PENNIE Everett CNP   Fluticasone-Umeclidin-Vilant (Mozelle Sous) 177-04.9-09 MCG/INH AEPB  9/20/22  Yes Historical Provider, MD   metoprolol succinate (TOPROL XL) 25 MG extended release tablet Take 1 tablet by mouth daily Take 1 tablet daily 9/16/22  Yes Vanessa Moulton MD   PROAIR  (90 Base) MCG/ACT inhaler Inhale 2 puffs into the lungs every 6 hours as needed for Wheezing (or Cough spells) 2/23/22  Yes Chelsea Matthews DO   metroNIDAZOLE (FLAGYL) 500 MG tablet Take 1 tablet by mouth 2 times daily  Patient not taking: Reported on 1/13/2023 10/11/22   PENNIE Everett CNP   naproxen (NAPROSYN) 250 MG tablet Take 1 tablet by mouth 2 times daily for 7 days 10/9/22 10/16/22  Roc Mclean DO   ibuprofen (ADVIL;MOTRIN) 600 MG tablet TAKE 1 TABLET BY MOUTH EVERY 6 HOURS AS NEEDED FOR PAIN 7/25/22 10/11/22  Chelsea Matthews DO       Allergies   Allergen Reactions    Cortisone Other (See Comments)     Redness and breaking into a sweat.     Medrol [Methylprednisolone]      Fever; redness; sweat    Bactrim [Sulfamethoxazole-Trimethoprim] Hives and Angioedema    Oxycodone Hcl     Oxycontin [Oxycodone Hcl] Itching    Penicillins      Cant remember reaction was from childhood    Sulfa Antibiotics     Wellbutrin [Bupropion]        Social History     Socioeconomic History    Marital status:      Spouse name: Not on file    Number of children: Not on file    Years of education: Not on file    Highest education level: Not on file   Occupational History    Not on file   Tobacco Use    Smoking status: Every Day     Packs/day: 1.00     Years: 30.00     Pack years: 30.00     Types: Cigarettes    Smokeless tobacco: Never   Vaping Use    Vaping Use: Never used   Substance and Sexual Activity    Alcohol use: Yes     Comment: rarely    Drug use: No    Sexual activity: Yes   Other Topics Concern    Not on file   Social History Narrative    Not on file     Social Determinants of Health     Financial Resource Strain: Low Risk     Difficulty of Paying Living Expenses: Not hard at all   Food Insecurity: No Food Insecurity    Worried About Running Out of Food in the Last Year: Never true    Ran Out of Food in the Last Year: Never true   Transportation Needs: Not on file   Physical Activity: Not on file   Stress: Not on file   Social Connections: Not on file   Intimate Partner Violence: Not on file   Housing Stability: Not on file       Family History   Problem Relation Age of Onset    High Blood Pressure Mother     High Blood Pressure Father        REVIEW OF SYSTEMS:     Erinn Huddleston denies fever/chills, chest pain, shortness of breath, new bowel or bladder complaints. All other review of systems was negative. PHYSICAL EXAMINATION:      /80   Temp 97.1 °F (36.2 °C) (Infrared)   Resp 18   Ht 5' 8\" (1.727 m)   Wt 196 lb (88.9 kg)   BMI 29.80 kg/m²   General:       General appearance:  Pleasant and well-hydrated, in no distress and A & O x 3  Build:Overweight  Function: Rises from seated position easily and Moves about room without difficulty     HEENT:     Head:normocephalic, atraumatic     Lungs:     Breathing:normal breathing pattern      CVS:     RRR     Abdomen:     Shape:non-distended and normal     Cervical spine:     Inspection:normal     Thoracic spine:                Spine inspection:normal   Palpation:No tenderness over the midline and paraspinal area, bilaterally  Range of motion:normal in flexion, extension rotation bilateral and is not painful.      Lumbar spine:     Spine inspection: Scar from the prior surgery noted healed well  Palpation: Tenderness paravertebral muscles Yes   Range of motion: Decreased, flexion Decreased, Lateral bending, extension and rotation bilaterally reduced is somewhat painful. Left lumbar facet tenderness + above the fusion. Sacroiliac joint tenderness Yes left  SLR : negative bilaterally      Musculoskeletal:     Trigger points no     Extremities:     Tremors:None bilaterally upper and lower  Edema:no     Neurological:     Sensory: Normal to light touch      Motor:                   Right Quadriceps 5/5          Left Quadriceps 5/5           Right Gastrocnemius 5/5    Left Gastrocnemius 5/5  Right Ant Tibialis 5/5  Left Ant Tibialis 5/5     Dermatology:     Skin:no rashes or lesions noted    Assessment/Plan:   Diagnosis Orders   1. Postlaminectomy syndrome, lumbar        2. Lumbosacral spondylosis without myelopathy        3. DDD (degenerative disc disease), lumbar        4. Sacroiliac dysfunction        5. Smoking          62 y.o.  female with H/o previous lumbar spine fusion surgery at L4 L5-S1 > 10 years ago. H/o low back pain. Denies lower extremity radiation or weakness or numbness. Recent CT lumbar spine/ MRI of LS spine reviewed and discussed with the patient. CT abdomen findings reviewed: Incidental diverticulosis, Has been evaluated by Gen surgery- treated with antibiotics- improved abdominal pain. Does not have abdominal pain now. Has also been evaluated by NSG had MRI. No surgical interventions. Has tenderness over the left lumbar facet and left SIJ. Has been evaluated by [facility] spine and recommended facet interventions. Recent Xray / MRI/ pertinent images/ consult notes reviewed. Recommend:    LEFT  LUMBAR MEDIAL BRANCH NERVE BLOCK UNDER FLUOROSCOPIC GUIDANCE AT L1-2, L2-3, L3-4 facets ( ABOVE THE LEVEL OF FUSION). If short term relief will do RFA. Moderate sedation due to H/o anxiety of needles. Counseling :Patient encouraged to stay active and to continue Regular home exercise program as tolerated - stretching / strengthening.      Smoking cessation counseling : yes      Treatment plan discussed with the patient including medication and procedure side effects. Controlled Substances Monitoring: OARRS reviewed. Puja Johnston MD    NOTE: The above documentation was prepared using voice recognition software. Every attempt was made to ensure accuracy but there may be spelling, grammatical, and contextual errors.

## 2023-01-23 ENCOUNTER — TELEPHONE (OUTPATIENT)
Dept: PAIN MANAGEMENT | Age: 58
End: 2023-01-23

## 2023-01-23 PROBLEM — M47.816 LUMBAR SPONDYLOSIS: Status: ACTIVE | Noted: 2023-01-23

## 2023-01-23 NOTE — TELEPHONE ENCOUNTER
Call to Rakan Licona that procedure was approved for 1/31/2023 and that [facility] should call her a few days before for the pre op call and after 3:00 PM the business day before with the arrival time. Instructed Niecy to hold ibuprofen for 24 hours, naprosyn for 4 days and any aspirin containing products or fish oil for 7 days. Instructed to call office back if any questions. Antonio Casiano verbalized understanding.     Electronically signed by Erin Abdi RN on 1/23/2023 at 1:40 PM

## 2023-01-24 ENCOUNTER — HOSPITAL ENCOUNTER (EMERGENCY)
Age: 58
Discharge: HOME | End: 2023-01-24
Payer: MEDICARE

## 2023-01-24 ENCOUNTER — APPOINTMENT (OUTPATIENT)
Dept: GENERAL RADIOLOGY | Age: 58
End: 2023-01-24
Payer: MEDICARE

## 2023-01-24 ENCOUNTER — TELEPHONE (OUTPATIENT)
Dept: FAMILY MEDICINE CLINIC | Age: 58
End: 2023-01-24

## 2023-01-24 VITALS
OXYGEN SATURATION: 99 % | TEMPERATURE: 98 F | DIASTOLIC BLOOD PRESSURE: 74 MMHG | RESPIRATION RATE: 18 BRPM | HEART RATE: 81 BPM | SYSTOLIC BLOOD PRESSURE: 115 MMHG | HEIGHT: 68 IN | WEIGHT: 200 LBS | BODY MASS INDEX: 30.31 KG/M2

## 2023-01-24 DIAGNOSIS — I10 ESSENTIAL HYPERTENSION: Primary | ICD-10-CM

## 2023-01-24 DIAGNOSIS — H69.83 DYSFUNCTION OF BOTH EUSTACHIAN TUBES: ICD-10-CM

## 2023-01-24 LAB
ALBUMIN SERPL-MCNC: 4.3 G/DL (ref 3.5–5.2)
ALP BLD-CCNC: 106 U/L (ref 35–104)
ALT SERPL-CCNC: 31 U/L (ref 0–32)
ANION GAP SERPL CALCULATED.3IONS-SCNC: 9 MMOL/L (ref 7–16)
AST SERPL-CCNC: 24 U/L (ref 0–31)
BASOPHILS ABSOLUTE: 0.02 E9/L (ref 0–0.2)
BASOPHILS RELATIVE PERCENT: 0.4 % (ref 0–2)
BILIRUB SERPL-MCNC: 0.3 MG/DL (ref 0–1.2)
BUN BLDV-MCNC: 12 MG/DL (ref 6–20)
CALCIUM SERPL-MCNC: 9.3 MG/DL (ref 8.6–10.2)
CHLORIDE BLD-SCNC: 104 MMOL/L (ref 98–107)
CO2: 27 MMOL/L (ref 22–29)
CREAT SERPL-MCNC: 0.7 MG/DL (ref 0.5–1)
EOSINOPHILS ABSOLUTE: 0.04 E9/L (ref 0.05–0.5)
EOSINOPHILS RELATIVE PERCENT: 0.8 % (ref 0–6)
GFR SERPL CREATININE-BSD FRML MDRD: >60 ML/MIN/1.73
GLUCOSE BLD-MCNC: 100 MG/DL (ref 74–99)
HCT VFR BLD CALC: 45.2 % (ref 34–48)
HEMOGLOBIN: 14.6 G/DL (ref 11.5–15.5)
IMMATURE GRANULOCYTES #: 0.01 E9/L
IMMATURE GRANULOCYTES %: 0.2 % (ref 0–5)
LYMPHOCYTES ABSOLUTE: 1.58 E9/L (ref 1.5–4)
LYMPHOCYTES RELATIVE PERCENT: 29.7 % (ref 20–42)
MCH RBC QN AUTO: 30.8 PG (ref 26–35)
MCHC RBC AUTO-ENTMCNC: 32.3 % (ref 32–34.5)
MCV RBC AUTO: 95.4 FL (ref 80–99.9)
MONOCYTES ABSOLUTE: 0.36 E9/L (ref 0.1–0.95)
MONOCYTES RELATIVE PERCENT: 6.8 % (ref 2–12)
NEUTROPHILS ABSOLUTE: 3.31 E9/L (ref 1.8–7.3)
NEUTROPHILS RELATIVE PERCENT: 62.1 % (ref 43–80)
PDW BLD-RTO: 12.5 FL (ref 11.5–15)
PLATELET # BLD: 164 E9/L (ref 130–450)
PMV BLD AUTO: 11.4 FL (ref 7–12)
POTASSIUM REFLEX MAGNESIUM: 4.2 MMOL/L (ref 3.5–5)
RBC # BLD: 4.74 E12/L (ref 3.5–5.5)
SODIUM BLD-SCNC: 140 MMOL/L (ref 132–146)
TOTAL PROTEIN: 6.8 G/DL (ref 6.4–8.3)
TROPONIN, HIGH SENSITIVITY: <6 NG/L (ref 0–9)
WBC # BLD: 5.3 E9/L (ref 4.5–11.5)

## 2023-01-24 PROCEDURE — 84484 ASSAY OF TROPONIN QUANT: CPT

## 2023-01-24 PROCEDURE — 99285 EMERGENCY DEPT VISIT HI MDM: CPT

## 2023-01-24 PROCEDURE — 71046 X-RAY EXAM CHEST 2 VIEWS: CPT

## 2023-01-24 PROCEDURE — 85025 COMPLETE CBC W/AUTO DIFF WBC: CPT

## 2023-01-24 PROCEDURE — 80053 COMPREHEN METABOLIC PANEL: CPT

## 2023-01-24 PROCEDURE — 93005 ELECTROCARDIOGRAM TRACING: CPT | Performed by: PHYSICIAN ASSISTANT

## 2023-01-24 RX ORDER — FLUTICASONE PROPIONATE 50 MCG
2 SPRAY, SUSPENSION (ML) NASAL DAILY
Qty: 16 G | Refills: 0 | Status: SHIPPED | OUTPATIENT
Start: 2023-01-24

## 2023-01-24 ASSESSMENT — PAIN - FUNCTIONAL ASSESSMENT: PAIN_FUNCTIONAL_ASSESSMENT: NONE - DENIES PAIN

## 2023-01-24 NOTE — TELEPHONE ENCOUNTER
Patient called stating blood pressure was high at home. Stated both systolic and diastolic were over 871 and was not sure what to do. I advised patient if her blood pressure is that high she needs to proceed to the ER. Patient voiced understanding.

## 2023-01-24 NOTE — ED PROVIDER NOTES
Independent [facility] Visit. Department of Emergency Medicine   ED  Provider Note  Admit Date/RoomTime: 1/24/2023  2:49 PM  ED Room: 26/26        HPI:  1/24/23,   Time: 3:13 PM BRI Marte is a 62 y.o. female presenting to the ED for elevated blood pressure, SOB and pressure in both ears, beginning just prior to arrival.  The complaint has been persistent, mild in severity, and worsened by nothing. The patient has a history of hypertension and COPD. She reports that she has not seen her PCP in quite some time. She did get a new monitor at home to check her blood pressure and became concerned because her blood pressure was 160/151 at home. She was taking her blood pressure because she has had some pressure in both of her ears for the past week or so and was blaming it on the hypertension. Patient states she does not feel sick. She just has this pressure-like pain in both ears. She denies any postnasal drip fever or cough. The patient is a chronic smoker with a history of COPD. She has chronic intermittent shortness of breath. No chest pain at this time.   Patient states that she did just see her pulmonologist.        ROS:     Constitutional: Negative for fever and chills  HENT: Negative for ear pain, sore throat and sinus pressure  Eyes: Negative for pain, discharge and redness  Respiratory:  See HPI  Cardiovascular:  See HPI  Gastrointestinal: Negative for nausea, vomiting, diarrhea and abdominal distention  Genitourinary: Negative for dysuria and frequency  Musculoskeletal: Negative for back pain and arthralgia  Skin: Negative for rash and wound  Neurological: Negative for weakness and headaches  Hematological: Negative for adenopathy    All other systems reviewed and are negative      -------------------------------- PAST HISTORY ----------------------------------  Past Medical History:  has a past medical history of At high risk for deep venous thrombosis, Cancer (HCC), COPD (chronic obstructive pulmonary disease) ([facility] DJD (degenerative joint disease) of knee, GERD (gastroesophageal reflux disease), H/O cardiovascular stress test, Headache, Hiatal hernia, Mixed hyperlipidemia, and SVT (supraventricular tachycardia) ([facility] Past Surgical History:  has a past surgical history that includes back surgery; knee surgery (Left); Tonsillectomy; Tubal ligation; Endometrial ablation; Knee arthroscopy (Right, 05/05/2017); Endometrial ablation; bone biopsy; Endoscopy, colon, diagnostic; Cholecystectomy, laparoscopic (N/A, 3/6/2020); Upper gastrointestinal endoscopy (N/A, 3/6/2020); and ablation of dysrhythmic focus (10/06/2020). Social History:  reports that she has been smoking cigarettes. She has a 30.00 pack-year smoking history. She has never used smokeless tobacco. She reports current alcohol use. She reports that she does not use drugs. Family History: family history includes High Blood Pressure in her father and mother. The patients home medications have been reviewed.     Allergies: Cortisone, Medrol [methylprednisolone], Bactrim [sulfamethoxazole-trimethoprim], Oxycodone hcl, Oxycontin [oxycodone hcl], Penicillins, Sulfa antibiotics, and Wellbutrin [bupropion]    --------------------------------- RESULTS ------------------------------------------  All laboratory and radiology results have been personally reviewed by myself   LABS:  Results for orders placed or performed during the hospital encounter of 01/24/23   CBC with Auto Differential   Result Value Ref Range    WBC 5.3 4.5 - 11.5 E9/L    RBC 4.74 3.50 - 5.50 E12/L    Hemoglobin 14.6 11.5 - 15.5 g/dL    Hematocrit 45.2 34.0 - 48.0 %    MCV 95.4 80.0 - 99.9 fL    MCH 30.8 26.0 - 35.0 pg    MCHC 32.3 32.0 - 34.5 %    RDW 12.5 11.5 - 15.0 fL    Platelets 007 703 - 195 E9/L MPV 11.4 7.0 - 12.0 fL    Neutrophils % 62.1 43.0 - 80.0 %    Immature Granulocytes % 0.2 0.0 - 5.0 %    Lymphocytes % 29.7 20.0 - 42.0 %    Monocytes % 6.8 2.0 - 12.0 %    Eosinophils % 0.8 0.0 - 6.0 %    Basophils % 0.4 0.0 - 2.0 %    Neutrophils Absolute 3.31 1.80 - 7.30 E9/L    Immature Granulocytes # 0.01 E9/L    Lymphocytes Absolute 1.58 1.50 - 4.00 E9/L    Monocytes Absolute 0.36 0.10 - 0.95 E9/L    Eosinophils Absolute 0.04 (L) 0.05 - 0.50 E9/L    Basophils Absolute 0.02 0.00 - 0.20 E9/L   Comprehensive Metabolic Panel w/ Reflex to MG   Result Value Ref Range    Sodium 140 132 - 146 mmol/L    Potassium reflex Magnesium 4.2 3.5 - 5.0 mmol/L    Chloride 104 98 - 107 mmol/L    CO2 27 22 - 29 mmol/L    Anion Gap 9 7 - 16 mmol/L    Glucose 100 (H) 74 - 99 mg/dL    BUN 12 6 - 20 mg/dL    Creatinine 0.7 0.5 - 1.0 mg/dL    Est, Glom Filt Rate >60 >=60 mL/min/1.73    Calcium 9.3 8.6 - 10.2 mg/dL    Total Protein 6.8 6.4 - 8.3 g/dL    Albumin 4.3 3.5 - 5.2 g/dL    Total Bilirubin 0.3 0.0 - 1.2 mg/dL    Alkaline Phosphatase 106 (H) 35 - 104 U/L    ALT 31 0 - 32 U/L    AST 24 0 - 31 U/L   Troponin   Result Value Ref Range    Troponin, High Sensitivity <6 0 - 9 ng/L       EKG #1:  Interpreted by emergency department attending physician unless otherwise noted. 1/24/23  Time: 14:57    Rhythm: normal sinus   Rate: normal  Axis: normal  Conduction: normal  ST Segments: no acute change  T Waves: no acute change    Clinical Impression: no acute changes  Comparison to Prior tracings: There are no significant changes when compared to prior tracings. RADIOLOGY:  Interpreted by Radiologist.  XR CHEST (2 VW)   Final Result          ----------------- NURSING NOTES AND VITALS REVIEWED ---------------   The nursing notes within the ED encounter and vital signs as below have been reviewed.    /74   Pulse 81   Temp 98 °F (36.7 °C)   Resp 18   Ht 5' 8\" (1.727 m)   Wt 200 lb (90.7 kg)   SpO2 99%   BMI 30.41 kg/m² Oxygen Saturation Interpretation: Normal      --------------------------------PHYSICAL EXAM------------------------------------      Constitutional/General: Alert and oriented x3, well appearing, non toxic in NAD  Head: NC/AT  Eyes: PERRL, EOMI  TM's intact and clear. Bulging noted on right. Mouth: Oropharynx clear, handling secretions, no trismus  Neck: Supple, full ROM, no meningeal signs  Pulmonary: Lungs clear to auscultation bilaterally, no wheezes, rales, or rhonchi. Not in respiratory distress  Cardiovascular:  Regular rate and rhythm, no murmurs, gallops, or rubs. 2+ distal pulses  Abdomen: Soft, + BS. No distension. Nontender. No palpable rigidity, rebound or guarding  Extremities: Moves all extremities x 4. Warm and well perfused  Skin: warm and dry without rash  Neurologic: GCS 15,  Intact. No focal deficits  Psych: Normal Affect      ------------------------ ED COURSE/MEDICAL DECISION MAKING----------------------  Medications - No data to display      Medical Decision Making:    The patient is here today concerned about her elevated blood pressure. I think there is a problem with the machine she has at home as her blood pressures here been all great. She is taking her meds as directed and continue to do so. She is complaining of pain and pressure in both ears. The ENT exam is unremarkable. Unfortunately underwent a give her a decongestant as this will raise her blood pressure. I will give her some Flonase that she can use at home and advised her to follow-up with her PCP. Patient is aware and agreeable to this       Counseling: The emergency provider has spoken with the patient and discussed todays results, in addition to providing specific details for the plan of care and counseling regarding the diagnosis and prognosis.   Questions are answered at this time and they are agreeable with the plan.      ------------------------ IMPRESSION AND DISPOSITION -------------------------------    IMPRESSION  1. Essential hypertension    2.  Dysfunction of both eustachian tubes        DISPOSITION  Disposition: Discharge to home  Patient condition is stable                   Amadou Newman PA-C  01/24/23 1746

## 2023-01-25 LAB
EKG ATRIAL RATE: 83 BPM
EKG P AXIS: 71 DEGREES
EKG P-R INTERVAL: 146 MS
EKG Q-T INTERVAL: 364 MS
EKG QRS DURATION: 78 MS
EKG QTC CALCULATION (BAZETT): 427 MS
EKG R AXIS: 72 DEGREES
EKG T AXIS: 42 DEGREES
EKG VENTRICULAR RATE: 83 BPM

## 2023-01-25 PROCEDURE — 93010 ELECTROCARDIOGRAM REPORT: CPT | Performed by: INTERNAL MEDICINE

## 2023-01-27 ENCOUNTER — OFFICE VISIT (OUTPATIENT)
Dept: FAMILY MEDICINE CLINIC | Age: 58
End: 2023-01-27
Payer: MEDICARE

## 2023-01-27 VITALS
DIASTOLIC BLOOD PRESSURE: 80 MMHG | RESPIRATION RATE: 16 BRPM | SYSTOLIC BLOOD PRESSURE: 118 MMHG | TEMPERATURE: 98.1 F | HEIGHT: 68 IN | WEIGHT: 202 LBS | OXYGEN SATURATION: 97 % | BODY MASS INDEX: 30.62 KG/M2 | HEART RATE: 88 BPM

## 2023-01-27 DIAGNOSIS — J01.90 ACUTE SINUSITIS, RECURRENCE NOT SPECIFIED, UNSPECIFIED LOCATION: Primary | ICD-10-CM

## 2023-01-27 DIAGNOSIS — E78.2 MIXED HYPERLIPIDEMIA: ICD-10-CM

## 2023-01-27 DIAGNOSIS — E55.9 VITAMIN D DEFICIENCY: ICD-10-CM

## 2023-01-27 PROCEDURE — 3017F COLORECTAL CA SCREEN DOC REV: CPT | Performed by: NURSE PRACTITIONER

## 2023-01-27 PROCEDURE — 4004F PT TOBACCO SCREEN RCVD TLK: CPT | Performed by: NURSE PRACTITIONER

## 2023-01-27 PROCEDURE — G8427 DOCREV CUR MEDS BY ELIG CLIN: HCPCS | Performed by: NURSE PRACTITIONER

## 2023-01-27 PROCEDURE — G8417 CALC BMI ABV UP PARAM F/U: HCPCS | Performed by: NURSE PRACTITIONER

## 2023-01-27 PROCEDURE — 99213 OFFICE O/P EST LOW 20 MIN: CPT | Performed by: NURSE PRACTITIONER

## 2023-01-27 PROCEDURE — G8484 FLU IMMUNIZE NO ADMIN: HCPCS | Performed by: NURSE PRACTITIONER

## 2023-01-27 RX ORDER — GABAPENTIN 300 MG/1
300 CAPSULE ORAL DAILY
Qty: 90 CAPSULE | Refills: 0 | Status: CANCELLED | OUTPATIENT
Start: 2023-01-27 | End: 2023-02-26

## 2023-01-27 RX ORDER — CHOLECALCIFEROL (VITAMIN D3) 50 MCG
2000 TABLET ORAL DAILY
Qty: 90 TABLET | Refills: 0 | Status: SHIPPED | OUTPATIENT
Start: 2023-01-27

## 2023-01-27 RX ORDER — ATORVASTATIN CALCIUM 20 MG/1
20 TABLET, FILM COATED ORAL EVERY EVENING
Qty: 90 TABLET | Refills: 0 | Status: SHIPPED | OUTPATIENT
Start: 2023-01-27

## 2023-01-27 ASSESSMENT — PATIENT HEALTH QUESTIONNAIRE - PHQ9
SUM OF ALL RESPONSES TO PHQ QUESTIONS 1-9: 0
SUM OF ALL RESPONSES TO PHQ QUESTIONS 1-9: 0
1. LITTLE INTEREST OR PLEASURE IN DOING THINGS: 0
SUM OF ALL RESPONSES TO PHQ9 QUESTIONS 1 & 2: 0
SUM OF ALL RESPONSES TO PHQ QUESTIONS 1-9: 0
2. FEELING DOWN, DEPRESSED OR HOPELESS: 0
SUM OF ALL RESPONSES TO PHQ QUESTIONS 1-9: 0

## 2023-01-27 ASSESSMENT — ENCOUNTER SYMPTOMS
DIARRHEA: 0
NAUSEA: 0
COUGH: 1
VOMITING: 0
RHINORRHEA: 1
SORE THROAT: 0
SHORTNESS OF BREATH: 1
CONSTIPATION: 0
WHEEZING: 1

## 2023-01-27 NOTE — PROGRESS NOTES
Hortensia River (:  1965) is a 62 y.o. female,Established patient, here for evaluation of the following chief complaint(s):  Ear Fullness (Went to ER the other day due to High BP and ear fullness; They told her it was her ears and gave her Flonase; not better)         ASSESSMENT/PLAN:  1. Mixed hyperlipidemia  -     atorvastatin (LIPITOR) 20 MG tablet; Take 1 tablet by mouth every evening Takes at 4PM, Disp-90 tablet, R-0Normal  The current medical regimen is effective;  continue present plan and medications. 2. Vitamin D deficiency  -     vitamin D (CHOLECALCIFEROL) 50 MCG ( UT) TABS tablet; Take 1 tablet by mouth daily, Disp-90 tablet, R-0Normal  The current medical regimen is effective;  continue present plan and medications. 3.  Acute sinusitis, recurrence not specified, unspecified location  Flonase  Contact office if symptoms do not improve as expected or worsen. Return if symptoms worsen or fail to improve. Subjective   SUBJECTIVE/OBJECTIVE:  Complains of bilateral ear fullness. Went to [facility] ER on  due to ear fullness. She was given flonase and now has a runny nose. Review of Systems   Constitutional:  Negative for activity change, appetite change, fatigue and unexpected weight change. HENT:  Positive for ear pain (fullness) and rhinorrhea. Negative for congestion, ear discharge and sore throat. Respiratory:  Positive for cough, shortness of breath and wheezing. All chronic and stable   Cardiovascular:  Positive for palpitations (chronic). Negative for chest pain. Gastrointestinal:  Negative for constipation, diarrhea, nausea and vomiting. Neurological:  Positive for light-headedness and headaches. Negative for weakness. Psychiatric/Behavioral:  Negative for dysphoric mood and sleep disturbance. The patient is not nervous/anxious.          Objective   /80   Pulse 88   Temp 98.1 °F (36.7 °C) (Temporal)   Resp 16   Ht 5' 8\" (1.727 m) Wt 202 lb (91.6 kg)   SpO2 97%   BMI 30.71 kg/m²    Physical Exam  Constitutional:       General: She is not in acute distress. Appearance: Normal appearance. She is well-developed. HENT:      Head: Normocephalic and atraumatic. Right Ear: Tympanic membrane, ear canal and external ear normal.      Left Ear: Tympanic membrane, ear canal and external ear normal.      Nose: Congestion present. Mouth/Throat:      Mouth: Mucous membranes are moist.      Pharynx: Posterior oropharyngeal erythema present. Neck:      Thyroid: No thyromegaly. Trachea: No tracheal deviation. Cardiovascular:      Rate and Rhythm: Normal rate and regular rhythm. Heart sounds: Normal heart sounds. No murmur heard. Pulmonary:      Effort: Pulmonary effort is normal. No respiratory distress. Breath sounds: Normal breath sounds. No wheezing, rhonchi or rales. Chest:      Chest wall: No tenderness. Abdominal:      General: Bowel sounds are normal.      Palpations: Abdomen is soft. Tenderness: There is no abdominal tenderness. Lymphadenopathy:      Cervical: No cervical adenopathy. Skin:     General: Skin is warm and dry. Neurological:      Mental Status: She is alert and oriented to person, place, and time. Psychiatric:         Mood and Affect: Mood normal.         Behavior: Behavior normal.          [facility] low      An electronic signature was used to authenticate this note.     --PENNIE Jeronimo - CNP

## 2023-01-30 ENCOUNTER — ANESTHESIA EVENT (OUTPATIENT)
Dept: OPERATING ROOM | Age: 58
End: 2023-01-30
Payer: MEDICARE

## 2023-01-30 ASSESSMENT — ENCOUNTER SYMPTOMS: SHORTNESS OF BREATH: 1

## 2023-01-30 ASSESSMENT — LIFESTYLE VARIABLES: SMOKING_STATUS: 1

## 2023-01-30 NOTE — ANESTHESIA PRE PROCEDURE
Department of Anesthesiology  Preprocedure Note       Name:  Omar Schumacher   Age:  62 y.o.  :  1965                                          MRN:  [id number]         Date:  2023      Surgeon: Ericka Tirado):  Alecia Nickerson MD    Procedure: LEFT  LUMBAR MEDIAL BRANCH NERVE BLOCK UNDER FLUOROSCOPIC GUIDANCE AT L1-2, L2-3, L3-4 facets (Left)    Medications prior to admission:   Prior to Admission medications    Medication Sig Start Date End Date Taking? Authorizing Provider   atorvastatin (LIPITOR) 20 MG tablet Take 1 tablet by mouth every evening Takes at [facility] 23   PENNIE Campbell CNP   vitamin D (CHOLECALCIFEROL) 50 MCG (2000) TABS tablet Take 1 tablet by mouth daily 23   PENNIE Campbell CNP   fluticasone [facility]) 50 MCG/ACT nasal spray 2 sprays by Each Nostril route daily 23   Denae Poole PA-C   gabapentin (NEURONTIN) 300 MG capsule daily.  Pt has not begun medication 10/28/22   Historical Provider, MD   SYMBICORT 160-4.5 MCG/ACT AERO daily am 22   Historical Provider, MD   pantoprazole (PROTONIX) 40 MG tablet TAKE 1 TABLET BY MOUTH EVERY MORNING (BEFORE BREAKFAST)  Patient taking differently: Take 40 mg by mouth every morning (before breakfast) Takes at [facility] 22   PENNIE Campbell CNP   Fluticasone-Umeclidin-Vilant (Marva Kamini) 265-43.7-44 MCG/INH AEPB  22   Historical Provider, MD   metoprolol succinate (TOPROL XL) 25 MG extended release tablet Take 1 tablet by mouth daily Take 1 tablet daily  Patient taking differently: Take 25 mg by mouth daily Take at [facility] 22   Pranav Marcos MD   ibuprofen (ADVIL;MOTRIN) 600 MG tablet TAKE 1 TABLET BY MOUTH EVERY 6 HOURS AS NEEDED FOR PAIN  Patient taking differently: Take 600 mg by mouth every 6 hours as needed for Pain Hold pre-op 22  Donnie Amanda DO   PROAIR  (90 Base) MCG/ACT inhaler Inhale 2 puffs into the lungs every 6 hours as needed for Wheezing (or Cough spells) 2/23/22   Bertin Juarez DO       Current medications:    Current Outpatient Medications   Medication Sig Dispense Refill    atorvastatin (LIPITOR) 20 MG tablet Take 1 tablet by mouth every evening Takes at 4PM 90 tablet 0    vitamin D (CHOLECALCIFEROL) 50 MCG (2000 UT) TABS tablet Take 1 tablet by mouth daily 90 tablet 0    fluticasone (FLONASE) 50 MCG/ACT nasal spray 2 sprays by Each Nostril route daily 16 g 0    gabapentin (NEURONTIN) 300 MG capsule daily. Pt has not begun medication      SYMBICORT 160-4.5 MCG/ACT AERO daily am      pantoprazole (PROTONIX) 40 MG tablet TAKE 1 TABLET BY MOUTH EVERY MORNING (BEFORE BREAKFAST) (Patient taking differently: Take 40 mg by mouth every morning (before breakfast) Takes at 4PM) 30 tablet 3    Fluticasone-Umeclidin-Vilant (TRELEGY ELLIPTA) 200-62.5-25 MCG/INH AEPB  (Patient not taking: No sig reported)      metoprolol succinate (TOPROL XL) 25 MG extended release tablet Take 1 tablet by mouth daily Take 1 tablet daily (Patient taking differently: Take 25 mg by mouth daily Take at 4PM) 30 tablet 5    ibuprofen (ADVIL;MOTRIN) 600 MG tablet TAKE 1 TABLET BY MOUTH EVERY 6 HOURS AS NEEDED FOR PAIN (Patient taking differently: Take 600 mg by mouth every 6 hours as needed for Pain Hold pre-op) 20 tablet 0    PROAIR  (90 Base) MCG/ACT inhaler Inhale 2 puffs into the lungs every 6 hours as needed for Wheezing (or Cough spells) 8.5 g 3     No current facility-administered medications for this visit. Allergies: Allergies   Allergen Reactions    Cortisone Other (See Comments)     Redness and breaking into a sweat.    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    DDD (degenerative disc disease), lumbar M51.36    Elbow pain M25.529    Lateral epicondylitis M77.10    SVT (supraventricular tachycardia) ([facility]) I47.1    Palpitations R00.2    Primary osteoarthritis of left knee M17.12    Mixed hyperlipidemia E78.2    Vitamin D deficiency E55.9    Bronchitis, acute, with bronchospasm J20.9    Exertional dyspnea R06.09    Primary osteoarthritis of right knee M17.11    Acute medial meniscal tear S83.249A    Complex tear of lateral meniscus of right knee as current injury S83.271A    Synovitis M65.9    Chronic obstructive pulmonary disease (HCC) J44.9    Atypical chest pain R07.89    Symptomatic cholelithiasis K80.20    Gastroesophageal reflux disease without esophagitis K21.9    Chest pain R07.9    Lumbosacral spondylosis without myelopathy M47.817    Lumbar spondylosis M47.816       Past Medical History:        Diagnosis Date    At high risk for deep venous thrombosis     Cancer ([facility])     cervical    COPD (chronic obstructive pulmonary disease) ([facility])     early stage    DJD (degenerative joint disease) of knee 07/18/2014    GERD (gastroesophageal reflux disease)     H/O cardiovascular stress test 05/13/2020    Lexiscan- negative; Dr. Shira Sheldon last seen 5/22    Headache     Hiatal hernia     Mixed hyperlipidemia 10/25/2016    Sinus infection     SVT (supraventricular tachycardia) ([facility])     had episode of SVT 6 months ago and several small episode over last couple months       Past Surgical History:        Procedure Laterality Date    ABLATION OF DYSRHYTHMIC FOCUS  10/06/2020    Succesful Ablation AVN Reentry Tachycardia    (Dr. Asha Freire)    BACK SURGERY      lumbar pins & screws    BONE BIOPSY      CHOLECYSTECTOMY, LAPAROSCOPIC N/A 3/6/2020    CHOLECYSTECTOMY LAPAROSCOPIC performed by Robin Calvo MD at [facility]    ENDOMETRIAL ABLATION      ENDOMETRIAL ABLATION      ENDOSCOPY, COLON, DIAGNOSTIC      KNEE ARTHROSCOPY Right 05/05/2017    KNEE SURGERY Left     arthroscopy    TONSILLECTOMY      TUBAL LIGATION      UPPER GASTROINTESTINAL ENDOSCOPY N/A 3/6/2020    EGD ESOPHAGOGASTRODUODENOSCOPY performed by Diandra Daniels MD at [address] History:    Social History     Tobacco Use    Smoking status: Every Day     Packs/day: 1.00     Years: 30.00     Pack years: 30.00     Types: Cigarettes    Smokeless tobacco: Never   Substance Use Topics    Alcohol use: Yes     Comment: rarely                                Ready to quit: Not Answered  Counseling given: Not Answered      Vital Signs (Current): There were no vitals filed for this visit.                                            BP Readings from Last 3 Encounters:   01/27/23 118/80   01/24/23 115/74   01/13/23 122/80       NPO Status:  >8.H                                                                               BMI:   Wt Readings from Last 3 Encounters:   01/27/23 202 lb (91.6 kg)   01/24/23 200 lb (90.7 kg)   01/13/23 196 lb (88.9 kg)     There is no height or weight on file to calculate BMI.    CBC:   Lab Results   Component Value Date/Time    WBC 5.3 01/24/2023 03:12 PM    RBC 4.74 01/24/2023 03:12 PM    HGB 14.6 01/24/2023 03:12 PM    HCT 45.2 01/24/2023 03:12 PM    MCV 95.4 01/24/2023 03:12 PM    RDW 12.5 01/24/2023 03:12 PM     01/24/2023 03:12 PM       CMP:   Lab Results   Component Value Date/Time     01/24/2023 03:12 PM    K 4.2 01/24/2023 03:12 PM     01/24/2023 03:12 PM    CO2 27 01/24/2023 03:12 PM    BUN 12 01/24/2023 03:12 PM    CREATININE 0.7 01/24/2023 03:12 PM    GFRAA >60 10/09/2022 02:42 PM    LABGLOM >60 01/24/2023 03:12 PM    GLUCOSE 100 01/24/2023 03:12 PM    GLUCOSE 95 04/22/2012 03:55 PM    PROT 6.8 01/24/2023 03:12 PM    CALCIUM 9.3 01/24/2023 03:12 PM    BILITOT 0.3 01/24/2023 03:12 PM    ALKPHOS 106 01/24/2023 03:12 PM    AST 24 01/24/2023 03:12 PM    ALT 31 01/24/2023 03:12 PM       POC Tests: No results for input(s): POCGLU, POCNA, POCK, POCCL, POCBUN, POCHEMO, POCHCT in the last 72 hours. Coags:   Lab Results   Component Value Date/Time    PROTIME 9.9 09/26/2020 11:18 PM    INR 0.9 09/26/2020 11:18 PM       HCG (If Applicable):   Lab Results   Component Value Date    PREGTESTUR NEGATIVE 01/02/2022        ABGs: No results found for: PHART, PO2ART, TIQ4ODX, RHD1ILQ, BEART, T0KSDVHI     Type & Screen (If Applicable):  No results found for: LABABO, LABRH     12 Lead EKG 3/5/20 HR 74  Narrative & Impression     Normal sinus rhythm  Cannot rule out Anterior infarct (cited on or before 10-JUL-2019)  Abnormal ECG  When compared with ECG of 10-JUL-2019 15:32,  No significant change was found     Echo 12/19/17   Findings      Left Ventricle   Left ventricular internal dimensions were normal in diastole and systole. No regional wall motion abnormalities seen. Normal left ventricular ejection fraction. Ejection fraction is visually estimated at 65%. Normal left ventricular diastolic filling pattern for age. Right Ventricle   Normal right ventricular size and function. Left Atrium   Normal sized left atrium. Interatrial septum appears intact. Right Atrium   Normal right atrium size. Mitral Valve   Normal mitral valve structure and function. Tricuspid Valve   Normal tricuspid valve structure and function. Aortic Valve   Structurally normal aortic valve. Pulmonic Valve   Pulmonic valve is structurally normal.      Pericardial Effusion   No evidence of pericardial effusion. Aorta   Aortic root dimension within normal limits. Conclusions      Summary   Normal transthoracic echocardiogram      Signature    CXR 7/10/19  Findings: The cardiomediastinal silhouette is stable in size and contours. Bilateral lungs and costophrenic angles are clear.  There is no evidence of pneumothorax.            Impression   No acute cardiopulmonary disease. Anesthesia Evaluation  Patient summary reviewed and Nursing notes reviewed no history of anesthetic complications:   Airway: Mallampati: III  TM distance: <3 FB   Neck ROM: full  Mouth opening: < 3 FB   Dental:    (+) partials and upper dentures  Comment: Partial on bottom molars  Top denture    Pulmonary:normal exam  breath sounds clear to auscultation  (+) COPD:  shortness of breath: chronic,  current smoker (1ppd)          Patient did not smoke on day of surgery. Cardiovascular:  Exercise tolerance: poor (<4 METS),   (+) dysrhythmias (Takes Metoprolol as needed when in SVT, last episode about 1 year ago): SVT, [place]:, hyperlipidemia      ECG reviewed  Rhythm: regular  Rate: normal  Echocardiogram reviewed         Beta Blocker:  Not on Beta Blocker         Neuro/Psych:   (+) neuromuscular disease (Numbness in hands):, headaches: migraine headaches,              ROS comment: DJD GI/Hepatic/Renal:   (+) hiatal hernia, GERD: poorly controlled,          ROS comment: For lap ricky and EGD 3/6/20. Endo/Other:    (+) : arthritis (OA throughout body): OA., malignancy/cancer (cervical - removed surgically, no chemo). Pt had no PAT visit       Abdominal:         (-) obese       Vascular: negative vascular ROS. Other Findings:             Anesthesia Plan      MAC     ASA 3       Induction: intravenous. Anesthetic plan and risks discussed with patient. Plan discussed with CRNA. Attending anesthesiologist reviewed and agrees with Preprocedure content          PAT Chart Review:  Chart reviewed per routine by Laura Burton MD.  Above represents information available via shared medical record including previous anesthesia history, drug and allergy history.   Confirmation of above and final plan per Day of Surgery (DOS) anesthesiologist.    Laura Burton MD  7:38 AM

## 2023-01-31 ENCOUNTER — HOSPITAL ENCOUNTER (OUTPATIENT)
Dept: OPERATING ROOM | Age: 58
Setting detail: OUTPATIENT SURGERY
Discharge: HOME | End: 2023-01-31
Attending: ANESTHESIOLOGY
Payer: MEDICARE

## 2023-01-31 ENCOUNTER — ANESTHESIA (OUTPATIENT)
Dept: OPERATING ROOM | Age: 58
End: 2023-01-31
Payer: MEDICARE

## 2023-01-31 ENCOUNTER — HOSPITAL ENCOUNTER (OUTPATIENT)
Age: 58
Setting detail: OUTPATIENT SURGERY
Discharge: HOME | End: 2023-01-31
Attending: ANESTHESIOLOGY | Admitting: ANESTHESIOLOGY
Payer: MEDICARE

## 2023-01-31 VITALS
SYSTOLIC BLOOD PRESSURE: 160 MMHG | WEIGHT: 201 LBS | BODY MASS INDEX: 30.46 KG/M2 | RESPIRATION RATE: 14 BRPM | OXYGEN SATURATION: 96 % | TEMPERATURE: 98.6 F | HEIGHT: 68 IN | DIASTOLIC BLOOD PRESSURE: 77 MMHG | HEART RATE: 68 BPM

## 2023-01-31 DIAGNOSIS — M47.816 LUMBAR SPONDYLOSIS: ICD-10-CM

## 2023-01-31 DIAGNOSIS — M47.896 OTHER OSTEOARTHRITIS OF SPINE, LUMBAR REGION: ICD-10-CM

## 2023-01-31 PROCEDURE — 6360000002 HC RX W HCPCS: Performed by: NURSE ANESTHETIST, CERTIFIED REGISTERED

## 2023-01-31 PROCEDURE — 2500000003 HC RX 250 WO HCPCS: Performed by: ANESTHESIOLOGY

## 2023-01-31 PROCEDURE — 2709999900 HC NON-CHARGEABLE SUPPLY: Performed by: ANESTHESIOLOGY

## 2023-01-31 PROCEDURE — 3700000001 HC ADD 15 MINUTES (ANESTHESIA): Performed by: ANESTHESIOLOGY

## 2023-01-31 PROCEDURE — 2580000003 HC RX 258: Performed by: ANESTHESIOLOGY

## 2023-01-31 PROCEDURE — 7100000010 HC PHASE II RECOVERY - FIRST 15 MIN: Performed by: ANESTHESIOLOGY

## 2023-01-31 PROCEDURE — 3700000000 HC ANESTHESIA ATTENDED CARE: Performed by: ANESTHESIOLOGY

## 2023-01-31 PROCEDURE — 7100000011 HC PHASE II RECOVERY - ADDTL 15 MIN: Performed by: ANESTHESIOLOGY

## 2023-01-31 PROCEDURE — 6360000002 HC RX W HCPCS: Performed by: ANESTHESIOLOGY

## 2023-01-31 PROCEDURE — 3600000015 HC SURGERY LEVEL 5 ADDTL 15MIN: Performed by: ANESTHESIOLOGY

## 2023-01-31 PROCEDURE — 3209999900 FLUORO FOR SURGICAL PROCEDURES

## 2023-01-31 PROCEDURE — 3600000005 HC SURGERY LEVEL 5 BASE: Performed by: ANESTHESIOLOGY

## 2023-01-31 RX ORDER — SODIUM CHLORIDE 9 MG/ML
INJECTION, SOLUTION INTRAVENOUS PRN
Status: DISCONTINUED | OUTPATIENT
Start: 2023-01-31 | End: 2023-01-31 | Stop reason: HOSPADM

## 2023-01-31 RX ORDER — SODIUM CHLORIDE 0.9 % (FLUSH) 0.9 %
5-40 SYRINGE (ML) INJECTION PRN
Status: DISCONTINUED | OUTPATIENT
Start: 2023-01-31 | End: 2023-01-31 | Stop reason: HOSPADM

## 2023-01-31 RX ORDER — ONDANSETRON 2 MG/ML
4 INJECTION INTRAMUSCULAR; INTRAVENOUS
Status: DISCONTINUED | OUTPATIENT
Start: 2023-01-31 | End: 2023-01-31 | Stop reason: HOSPADM

## 2023-01-31 RX ORDER — SODIUM CHLORIDE, SODIUM LACTATE, POTASSIUM CHLORIDE, CALCIUM CHLORIDE 600; 310; 30; 20 MG/100ML; MG/100ML; MG/100ML; MG/100ML
INJECTION, SOLUTION INTRAVENOUS CONTINUOUS
Status: DISCONTINUED | OUTPATIENT
Start: 2023-01-31 | End: 2023-01-31 | Stop reason: HOSPADM

## 2023-01-31 RX ORDER — MIDAZOLAM HYDROCHLORIDE 1 MG/ML
INJECTION INTRAMUSCULAR; INTRAVENOUS PRN
Status: DISCONTINUED | OUTPATIENT
Start: 2023-01-31 | End: 2023-01-31 | Stop reason: SDUPTHER

## 2023-01-31 RX ORDER — FENTANYL CITRATE 50 UG/ML
INJECTION, SOLUTION INTRAMUSCULAR; INTRAVENOUS PRN
Status: DISCONTINUED | OUTPATIENT
Start: 2023-01-31 | End: 2023-01-31 | Stop reason: SDUPTHER

## 2023-01-31 RX ORDER — SODIUM CHLORIDE 0.9 % (FLUSH) 0.9 %
5-40 SYRINGE (ML) INJECTION EVERY 12 HOURS SCHEDULED
Status: DISCONTINUED | OUTPATIENT
Start: 2023-01-31 | End: 2023-01-31 | Stop reason: HOSPADM

## 2023-01-31 RX ORDER — LIDOCAINE HYDROCHLORIDE 5 MG/ML
INJECTION, SOLUTION INFILTRATION; INTRAVENOUS PRN
Status: DISCONTINUED | OUTPATIENT
Start: 2023-01-31 | End: 2023-01-31 | Stop reason: ALTCHOICE

## 2023-01-31 RX ORDER — MEPERIDINE HYDROCHLORIDE 25 MG/ML
12.5 INJECTION INTRAMUSCULAR; INTRAVENOUS; SUBCUTANEOUS ONCE
Status: DISCONTINUED | OUTPATIENT
Start: 2023-01-31 | End: 2023-01-31 | Stop reason: HOSPADM

## 2023-01-31 RX ORDER — DEXAMETHASONE SODIUM PHOSPHATE 10 MG/ML
INJECTION, SOLUTION INTRAMUSCULAR; INTRAVENOUS PRN
Status: DISCONTINUED | OUTPATIENT
Start: 2023-01-31 | End: 2023-01-31 | Stop reason: ALTCHOICE

## 2023-01-31 RX ORDER — DIPHENHYDRAMINE HYDROCHLORIDE 50 MG/ML
12.5 INJECTION INTRAMUSCULAR; INTRAVENOUS
Status: DISCONTINUED | OUTPATIENT
Start: 2023-01-31 | End: 2023-01-31 | Stop reason: HOSPADM

## 2023-01-31 RX ORDER — BUPIVACAINE HYDROCHLORIDE 5 MG/ML
INJECTION, SOLUTION PERINEURAL PRN
Status: DISCONTINUED | OUTPATIENT
Start: 2023-01-31 | End: 2023-01-31 | Stop reason: ALTCHOICE

## 2023-01-31 RX ORDER — DIPHENHYDRAMINE HYDROCHLORIDE 50 MG/ML
INJECTION INTRAMUSCULAR; INTRAVENOUS PRN
Status: DISCONTINUED | OUTPATIENT
Start: 2023-01-31 | End: 2023-01-31 | Stop reason: SDUPTHER

## 2023-01-31 RX ORDER — ONDANSETRON 2 MG/ML
INJECTION INTRAMUSCULAR; INTRAVENOUS PRN
Status: DISCONTINUED | OUTPATIENT
Start: 2023-01-31 | End: 2023-01-31 | Stop reason: SDUPTHER

## 2023-01-31 RX ADMIN — SODIUM CHLORIDE, POTASSIUM CHLORIDE, SODIUM LACTATE AND CALCIUM CHLORIDE: 600; 310; 30; 20 INJECTION, SOLUTION INTRAVENOUS at 12:34

## 2023-01-31 RX ADMIN — DIPHENHYDRAMINE HYDROCHLORIDE 12.5 MG: 50 INJECTION, SOLUTION INTRAMUSCULAR; INTRAVENOUS at 14:13

## 2023-01-31 RX ADMIN — ONDANSETRON 4 MG: 2 INJECTION INTRAMUSCULAR; INTRAVENOUS at 14:11

## 2023-01-31 RX ADMIN — FENTANYL CITRATE 50 MCG: 50 INJECTION INTRAMUSCULAR; INTRAVENOUS at 14:13

## 2023-01-31 RX ADMIN — FENTANYL CITRATE 50 MCG: 50 INJECTION INTRAMUSCULAR; INTRAVENOUS at 14:03

## 2023-01-31 RX ADMIN — MIDAZOLAM 2 MG: 1 INJECTION INTRAMUSCULAR; INTRAVENOUS at 14:03

## 2023-01-31 ASSESSMENT — PAIN - FUNCTIONAL ASSESSMENT: PAIN_FUNCTIONAL_ASSESSMENT: 0-10

## 2023-01-31 NOTE — OP NOTE
Operative Note      Patient: Mj Wells  YOB: 1965  MRN: [id number]    Date of Procedure: 2023    Pre-Op Diagnosis: Lumbar spondylosis [M47.816]    Post-Op Diagnosis: Same       Procedure(s):  LEFT  LUMBAR MEDIAL BRANCH NERVE BLOCK UNDER FLUOROSCOPIC GUIDANCE AT L1, L2, L3 , L4 MB    Surgeon(s):  Elizabeth Escobar MD    Assistant:   * No surgical staff found *    Anesthesia: Monitor Anesthesia Care    Estimated Blood Loss (mL): Minimal    Complications: None    Specimens:   * No specimens in log *    Implants:  * No implants in log *      Drains: * No LDAs found *    Findings: good needle placement    Detailed Description of Procedure:   2023    Patient: Mj Wells  :  1965  Age:  62 y.o. Sex:  female     PRE-OPERATIVE DIAGNOSIS:  Lumbar spondylosis, lumbar facet syndrome. POST-OPERATIVE DIAGNOSIS: Same. PROCEDURE:  # 1 Fluoroscopic guided lumbar medial branch blocks Left at Levels: L1, L2, L3 , L4     SURGEON: Elizabeth Escobar MD    ANESTHESIA: MAC    ESTIMATED BLOOD LOSS: None.  ______________________________________________________________________  BRIEF HISTORY:  Rakesh River comes in today for 1 fluoroscopic guided lumbar medial branch blocks  Left  at L1, L2, L3 , L4. The potential complications of this procedure were discussed with her again today. She has elected to undergo the aforementioned procedure. Rebeka complete History & Physical examination were reviewed in depth, a copy of which is in the chart. DESCRIPTION OF PROCEDURE:   After confirming written and informed consent, a time-out was performed and Rebeka name and date of birth, the procedure to be performed as well as the plan for the location of the needle insertion were confirmed. The patient was brought into the procedure room and placed in the prone position on the fluoroscopy table. Standard monitors were placed and vital signs were observed throughout the procedure.  The area of the lumbar spine was prepped with chloraprep and draped in a sterile manner. AP fluoroscopy was used to identify and nelson bartons point at the targeted levels. The skin and subcutaneous tissues in these identified areas were anesthetized with 0.5% Lidocaine. A 22 # gauge 3.5 inch spinal needle was advanced toward the junction of the superior articular process and the transverse process, along the course of the medial branch. Satisfactory needle placement was confirmed by AP and oblique projections. At the sacral alar level, the sacral alar region was visualized and the needle tip was positioned on the sacral alar at the base of the superior articulating process where the medial branch traverses under fluoroscopic guidance. Once bone was contacted and negative aspiration was confirmed. A solution of 0.5% marcaine with 2 mg dexamethasone 1 cc was then injected at each level. Following the procedure Andres Cross noted improvement of previous pain symptoms. NOTE: IV Benadryl was given by anesthesia team for h/o ? Allergy to steroids. Disposition the patient tolerated the procedure well and there were no complications . Vital signs remained stable throughout the procedure. The patient was escorted to the recovery area where they remained until discharge and written discharge instructions for the procedure were given. Plan: Andres Cross will return to our pain management center as scheduled. She will observe for the response from the procedure.     Nataliya Jean MD

## 2023-01-31 NOTE — DISCHARGE INSTRUCTIONS
[facility]) Pain Management Department  [phone]   Post-Pain Block/ Radiofrequency Home Going Instructions    1-Go home, rest for the remainder of the day  2-Please do not lift over 20 pounds the day of the injection  3-If you received sedation No: alcohol, driving, operating lawn mowers, plows, tractors or other dangerous equipment until next morning. Do not make important decisions or sign legal documents for 24 hours. You may experience light headedness, dizziness, nausea or sleepiness after sedation. Do not stay alone. A responsible adult must be with you for 24 hours. You could be nauseated from the medications you have received. Your IV site may be sore and bruised. 4-No dietary restrictions     5-Resume all medications the same day, blood thinners to be resumed 24 hours after injection    6-Keep the surgical site clean and dry, you may shower the next morning and remove the      dressing. 7- No sitz baths, tub baths or hot tubs/swimming for 24 hours. 8- If you have any pain at the injection site(s), application of an ice pack to the area should be       helpful, 20 minutes on/20 minutes off for next 48 hours. 9- [facility] at if you develop.   Fever greater than 100.4 F  Have bleeding or drainage from the puncture site  Have progressive Leg/arm numbness and or weakness  Loss of control of bowel and or bladder (wet/soil yourself)  Severe headache with inability to lift head  10-You may return to work the next day

## 2023-01-31 NOTE — INTERVAL H&P NOTE
Update History & Physical    The patient's History and Physical of January 13, 2023 was reviewed with the patient and I examined the patient. There was no change. The surgical site was confirmed by the patient and me. Plan: Lumbar facet/ MBNB The risks, benefits, expected outcome, and alternative to the recommended procedure have been discussed with the patient. Patient understands and wants to proceed with the procedure.      Electronically signed by Holger Duarte MD on 1/31/2023

## 2023-01-31 NOTE — ANESTHESIA POSTPROCEDURE EVALUATION
Department of Anesthesiology  Postprocedure Note    Patient: Alisia Patricia  MRN: [id number]  YOB: 1965  Date of evaluation: 1/31/2023      Procedure Summary     Date: 01/31/23 Room / Location: [address] [place] [address] 04 / [address] [facility]    Anesthesia Start: 8423 Anesthesia Stop: 5320    Procedure: LEFT  LUMBAR MEDIAL BRANCH NERVE BLOCK UNDER FLUOROSCOPIC GUIDANCE AT L1-2, L2-3, L3-4 facets (Left: Back) Diagnosis:       Lumbar spondylosis      (Lumbar spondylosis [R35.061])    Surgeons: Bella Guzman MD Responsible Provider: Dannielle Dave MD    Anesthesia Type: MAC ASA Status: 3          Anesthesia Type: MAC    Steven Phase I: Steven Score: 10    Steven Phase II: Steven Score: 10      Anesthesia Post Evaluation    Patient location during evaluation: PACU  Patient participation: complete - patient participated  Level of consciousness: awake and alert  Airway patency: patent  Nausea & Vomiting: no nausea and no vomiting  Complications: no  Cardiovascular status: hemodynamically stable  Respiratory status: room air and spontaneous ventilation  Hydration status: stable

## 2023-02-13 ENCOUNTER — TELEPHONE (OUTPATIENT)
Dept: CASE MANAGEMENT | Age: 58
End: 2023-02-13

## 2023-02-13 NOTE — TELEPHONE ENCOUNTER
I called the patient and left a message reminding her of the CT lung screening at [address] [place] [address] on 2/14/2023 at 7:00 am with an 6:30 am arrival.  If unable to keep this appt call the office at [phone] to get rescheduled.             Electronically signed by Wanda Hdez on 2/13/23 at 11:38 AM EST

## 2023-02-14 ENCOUNTER — HOSPITAL ENCOUNTER (OUTPATIENT)
Dept: CT IMAGING | Age: 58
Discharge: HOME | End: 2023-02-14
Payer: MEDICARE

## 2023-02-14 DIAGNOSIS — F17.210 CIGARETTE SMOKER: ICD-10-CM

## 2023-02-14 DIAGNOSIS — Z87.891 PERSONAL HISTORY OF NICOTINE DEPENDENCE: ICD-10-CM

## 2023-02-14 DIAGNOSIS — Z87.891 PERSONAL HISTORY OF TOBACCO USE: ICD-10-CM

## 2023-02-14 PROCEDURE — 71271 CT THORAX LUNG CANCER SCR C-: CPT

## 2023-02-15 ENCOUNTER — OFFICE VISIT (OUTPATIENT)
Dept: PAIN MANAGEMENT | Age: 58
End: 2023-02-15
Payer: MEDICARE

## 2023-02-15 ENCOUNTER — PREP FOR PROCEDURE (OUTPATIENT)
Dept: PAIN MANAGEMENT | Age: 58
End: 2023-02-15

## 2023-02-15 ENCOUNTER — TELEPHONE (OUTPATIENT)
Dept: CASE MANAGEMENT | Age: 58
End: 2023-02-15

## 2023-02-15 VITALS
SYSTOLIC BLOOD PRESSURE: 138 MMHG | BODY MASS INDEX: 30.46 KG/M2 | HEIGHT: 68 IN | TEMPERATURE: 97.3 F | DIASTOLIC BLOOD PRESSURE: 92 MMHG | WEIGHT: 201 LBS

## 2023-02-15 DIAGNOSIS — M51.36 DDD (DEGENERATIVE DISC DISEASE), LUMBAR: ICD-10-CM

## 2023-02-15 DIAGNOSIS — M47.817 LUMBOSACRAL SPONDYLOSIS WITHOUT MYELOPATHY: Primary | ICD-10-CM

## 2023-02-15 PROCEDURE — 99213 OFFICE O/P EST LOW 20 MIN: CPT | Performed by: ANESTHESIOLOGY

## 2023-02-15 PROCEDURE — 4004F PT TOBACCO SCREEN RCVD TLK: CPT | Performed by: ANESTHESIOLOGY

## 2023-02-15 PROCEDURE — G8484 FLU IMMUNIZE NO ADMIN: HCPCS | Performed by: ANESTHESIOLOGY

## 2023-02-15 PROCEDURE — 3017F COLORECTAL CA SCREEN DOC REV: CPT | Performed by: ANESTHESIOLOGY

## 2023-02-15 PROCEDURE — G8417 CALC BMI ABV UP PARAM F/U: HCPCS | Performed by: ANESTHESIOLOGY

## 2023-02-15 PROCEDURE — G8427 DOCREV CUR MEDS BY ELIG CLIN: HCPCS | Performed by: ANESTHESIOLOGY

## 2023-02-15 RX ORDER — SODIUM CHLORIDE 9 MG/ML
INJECTION, SOLUTION INTRAVENOUS PRN
Status: CANCELLED | OUTPATIENT
Start: 2023-02-15

## 2023-02-15 RX ORDER — SODIUM CHLORIDE 0.9 % (FLUSH) 0.9 %
5-40 SYRINGE (ML) INJECTION EVERY 12 HOURS SCHEDULED
Status: CANCELLED | OUTPATIENT
Start: 2023-02-15

## 2023-02-15 RX ORDER — SODIUM CHLORIDE 0.9 % (FLUSH) 0.9 %
5-40 SYRINGE (ML) INJECTION PRN
Status: CANCELLED | OUTPATIENT
Start: 2023-02-15

## 2023-02-15 NOTE — H&P (VIEW-ONLY)
[address]  [address] [facility], [address] [place] [address] Luke  [phone]    Follow up Note      Cecy River     Date of Visit:  2/15/2023    CC:  Patient presents for follow up   Chief Complaint   Patient presents with    Follow Up After Procedure     LEFT  LUMBAR MEDIAL BRANCH NERVE BLOCK UNDER FLUOROSCOPIC GUIDANCE AT L1, L2, L3 , L4 MB       HPI:  Chronic low back pain. Prior L4-L5-S1 interbody fusion > 10 yrs ago. Did well until recently. Left-sided low back pain- predominantly axial in nature. Has been evaluated : undergone CT abdomen and CT lumbar spine. Has been evaluated by NSG on 6/7/2022- no surgical indication. Pain causes functional limitations/ limits Adl's : Yes      Nursing notes and details of the pain history reviewed. Please see intake notes for details. Has been evaluated by [facility] spine - recommended facet interventions. Previous treatments:   Physical Therapy/ HEP : yes      Medications: - NSAID's : yes                         - Adjuvants or Others : yes      Lumbar spine Surgeries: yes, L4-5-S1 fusion     She has not been on anticoagulation medications      She has not been on herbal supplements. H/O Smoking: Yes  H/O alcohol abuse : no  H/O Illicit drug use : no     Employment: disability     Imaging:     XR LUMBAR MOTION 4V AP/LAT/ FLEX/EXT10/28/2022:  IMPRESSION:     Satisfactory postsurgical changes lower lumbar spine which is otherwise   unremarkable. Anatomic Variant: None. L4-5 is considered the level of the iliac crest   and assume there are 5 lumbar-type vertebrae. MRI of LS Spine: 5/31/2022:     FINDINGS:   BONES/ALIGNMENT: There is normal alignment of the spine. The vertebral body   heights are maintained. The bone marrow signal appears unremarkable.   Again   seen are postsurgical changes from prior posterior fusion instrumentation at   L4 through S1 with bilateral transpedicular screws and parallel interlocking rods.  Inter disc spacer at L4-5 and inter disc screw at L5-S1 again noted. SPINAL CORD:  The conus terminates normally. SOFT TISSUES: No abnormal enhancement is seen of the lumbar spine. No   paraspinal mass identified. L1-L2: There is no significant disc protrusion, spinal canal stenosis or   neural foraminal narrowing. L2-L3: There is no significant disc protrusion, spinal canal stenosis or   neural foraminal narrowing. L3-L4: Disc bulge and facet arthropathy contributing to mild bilateral   foraminal narrowing. No significant canal stenosis. L4-L5: There is no significant disc protrusion, spinal canal stenosis or   neural foraminal narrowing. L5-S1: There is no significant disc protrusion, spinal canal stenosis or   neural foraminal narrowing. Impression   1. Satisfactory alignment of the lumbar spine status post L4-S1 posterior   fusion instrumentation. 2. No significant canal or foraminal stenosis within the lumbar spine. X-ray LS spine flex ext: 3/3/2022:  Impression   Posterior fusion of the L4, L5, and S1 levels with satisfactory alignment and   no evidence of inducible instability in flexion or extension views. Narrowing of the neural foramina bilaterally at the L4/L5 and L5/S1 levels. Minimal degenerative changes seen within the sacroiliac joints bilaterally. CT lumbar spine: 1/11/2022:  FINDINGS:   There are bilateral pedicle screws at L4, L5, and S1 with intervening   posterior fusion bars. There is threaded fusion screw across the L5-S1   intervertebral disc. There is no evidence of acute fracture. Vertebral   alignment is anatomic. There are no compression deformities. There is right   hemilaminotomy at L4 and left hemilaminotomy at L5. There is bilateral facet   hypertrophy at L3-4 and L5-S1 as well as left facet hypertrophy at L4-5. The   paravertebral soft tissue structures are unremarkable.   There is no gross evidence of central canal stenosis or neural foraminal narrowing. There is a   left adrenal adenoma. The patient is status post cholecystectomy. There is   arteriosclerosis without abdominal aortic aneurysm. Impression   1. No acute fracture or subluxation. 2. Degenerative and postoperative changes in the lumbar spine without   evidence of central canal stenosis or definite neural foraminal narrowing. OARRS report[de-identified] reviewed.      Past Medical History:   Diagnosis Date    At high risk for deep venous thrombosis     Cancer (HCC)     cervical    COPD (chronic obstructive pulmonary disease) (HCC)     early stage    DJD (degenerative joint disease) of knee 07/18/2014    GERD (gastroesophageal reflux disease)     H/O cardiovascular stress test 05/13/2020    Lexiscan- negative; Dr. Mariela Alvarez last seen 5/22    Headache     Hiatal hernia     Mixed hyperlipidemia 10/25/2016    Sinus infection     SVT (supraventricular tachycardia) (HCC)     had episode of SVT 6 months ago and several small episode over last couple months       Past Surgical History:   Procedure Laterality Date    ABLATION OF DYSRHYTHMIC FOCUS  10/06/2020    Succesful Ablation AVN Reentry Tachycardia    (Dr. Tyson Faust)    BACK SURGERY      lumbar pins & screws    BONE BIOPSY      CHOLECYSTECTOMY, LAPAROSCOPIC N/A 3/6/2020    CHOLECYSTECTOMY LAPAROSCOPIC performed by Jaylyn Harris MD at [address] [facility] [place], DIAGNOSTIC      KNEE ARTHROSCOPY Right 05/05/2017    KNEE SURGERY Left     arthroscopy    NERVE BLOCK Left 1/31/2023    LEFT  LUMBAR MEDIAL BRANCH NERVE BLOCK UNDER FLUOROSCOPIC GUIDANCE AT L1-2, L2-3, L3-4 facets performed by Fern Gudino MD at [facility] N/A 3/6/2020    EGD ESOPHAGOGASTRODUODENOSCOPY performed by Jaylyn Harris MD at [id number] [place] [address]       Prior to Admission medications Medication Sig Start Date End Date Taking? Authorizing Provider   atorvastatin (LIPITOR) 20 MG tablet Take 1 tablet by mouth every evening Takes at [facility] 1/27/23  Yes PENNIE London CNP   vitamin D (CHOLECALCIFEROL) 50 MCG (2000 UT) TABS tablet Take 1 tablet by mouth daily 1/27/23  Yes PENNIE London - CNP   fluticasone [facility]) 50 MCG/ACT nasal spray 2 sprays by Each Nostril route daily 1/24/23  Yes Denae Poole PA-C   gabapentin (NEURONTIN) 300 MG capsule daily. Pt has not begun medication 10/28/22  Yes Historical Provider, MD   SYMBICORT 160-4.5 MCG/ACT AERO daily am 11/22/22  Yes Historical Provider, MD   pantoprazole (PROTONIX) 40 MG tablet TAKE 1 TABLET BY MOUTH EVERY MORNING (BEFORE BREAKFAST)  Patient taking differently: Take 40 mg by mouth every morning (before breakfast) Takes at [facility] 11/2/22  Yes PENNIE London CNP   Fluticasone-Umeclidin-Vilant (Zepeda Skyler) 133-72.1-63 MCG/INH AEPB  9/20/22  Yes Historical Provider, MD   metoprolol succinate (TOPROL XL) 25 MG extended release tablet Take 1 tablet by mouth daily Take 1 tablet daily  Patient taking differently: Take 25 mg by mouth daily Take at [facility] 9/16/22  Yes Jade Rubin MD   ibuprofen (ADVIL;MOTRIN) 600 MG tablet TAKE 1 TABLET BY MOUTH EVERY 6 HOURS AS NEEDED FOR PAIN  Patient taking differently: Take 600 mg by mouth every 6 hours as needed for Pain Hold pre-op 7/25/22 2/15/23 Yes Art Driscoll DO   PROAIR  (01 Base) MCG/ACT inhaler Inhale 2 puffs into the lungs every 6 hours as needed for Wheezing (or Cough spells) 2/23/22  Yes Art Driscoll DO       Allergies   Allergen Reactions    Cortisone Other (See Comments)     Redness and breaking into a sweat.     Medrol [Methylprednisolone]      Fever; redness; sweat    Bactrim [Sulfamethoxazole-Trimethoprim] Hives and Angioedema    Oxycodone Hcl     Oxycontin [Oxycodone Hcl] Itching    Penicillins      Cant remember reaction was from childhood Sulfa Antibiotics     Wellbutrin [Bupropion]        Social History     Socioeconomic History    Marital status:      Spouse name: Not on file    Number of children: Not on file    Years of education: Not on file    Highest education level: Not on file   Occupational History    Not on file   Tobacco Use    Smoking status: Every Day     Packs/day: 1.00     Years: 30.00     Pack years: 30.00     Types: Cigarettes    Smokeless tobacco: Never   Vaping Use    Vaping Use: Never used   Substance and Sexual Activity    Alcohol use: Yes     Comment: rarely    Drug use: No    Sexual activity: Yes   Other Topics Concern    Not on file   Social History Narrative    Not on file     Social Determinants of Health     Financial Resource Strain: Low Risk     Difficulty of Paying Living Expenses: Not hard at all   Food Insecurity: No Food Insecurity    Worried About Running Out of Food in the Last Year: Never true    Ran Out of Food in the Last Year: Never true   Transportation Needs: Not on file   Physical Activity: Not on file   Stress: Not on file   Social Connections: Not on file   Intimate Partner Violence: Not on file   Housing Stability: Not on file       Family History   Problem Relation Age of Onset    High Blood Pressure Mother     High Blood Pressure Father        REVIEW OF SYSTEMS:     Andrea Patel denies fever/chills, chest pain, shortness of breath, new bowel or bladder complaints. All other review of systems was negative.     PHYSICAL EXAMINATION:      BP (!) 138/92   Temp 97.3 °F (36.3 °C) (Infrared)   Ht 5' 8\" (1.727 m)   Wt 201 lb (91.2 kg)   LMP  (LMP Unknown)   BMI 30.56 kg/m²   General:       General appearance:  Pleasant and well-hydrated, in no distress and A & O x 3  Build:Overweight  Function: Rises from seated position easily and Moves about room without difficulty     HEENT:     Head:normocephalic, atraumatic     Lungs:     Breathing:normal breathing pattern      CVS:     RRR     Abdomen: Shape:non-distended and normal     Cervical spine:     Inspection:normal     Thoracic spine:                Spine inspection:normal   Palpation:No tenderness over the midline and paraspinal area, bilaterally  Range of motion:normal in flexion, extension rotation bilateral and is not painful. Lumbar spine:     Spine inspection: Scar from the prior surgery noted healed well  Palpation: Tenderness paravertebral muscles Yes   Range of motion: Decreased, flexion Decreased, Lateral bending, extension and rotation bilaterally reduced is somewhat painful. Left lumbar facet tenderness + above the fusion. Sacroiliac joint tenderness Yes left  SLR : negative bilaterally      Musculoskeletal:     Trigger points no     Extremities:     Tremors:None bilaterally upper and lower  Edema:no     Neurological:     Sensory: Normal to light touch      Motor:                   Right Quadriceps 5/5          Left Quadriceps 5/5           Right Gastrocnemius 5/5    Left Gastrocnemius 5/5  Right Ant Tibialis 5/5  Left Ant Tibialis 5/5     Dermatology:     Skin:no rashes or lesions noted    Assessment/Plan:   Diagnosis Orders   1. Lumbosacral spondylosis without myelopathy        2. DDD (degenerative disc disease), lumbar          62 y.o.  female with H/o previous lumbar spine fusion surgery at L4 L5-S1 > 10 years ago. H/o low back pain. Denies lower extremity radiation or weakness or numbness. Recent CT lumbar spine/ MRI of LS spine reviewed and discussed with the patient. CT abdomen findings reviewed: Incidental diverticulosis, Has been evaluated by Gen surgery- treated with antibiotics- improved abdominal pain. Does not have abdominal pain now. Has also been evaluated by NS had MRI. No surgical interventions. Has tenderness over the left lumbar facet and left SIJ. Has been evaluated by [facility] spine and recommended facet interventions. Recent Xray / MRI/ pertinent images/ consult notes reviewed.     S/P # 1 lumbar MBNB with > 90% relief for short duration. Has recurrence of similar pain. Doing HEP. Recommend:    # 2 LEFT  LUMBAR MEDIAL BRANCH NERVE BLOCK UNDER FLUOROSCOPIC GUIDANCE AT L1-2, L2-3, L3-4 facets (ABOVE THE LEVEL OF FUSION). If short term relief will do RFA. Moderate sedation due to H/o anxiety of needles. Counseling :Patient encouraged to stay active and to continue Regular home exercise program as tolerated - stretching / strengthening. Smoking cessation counseling : yes      Treatment plan discussed with the patient including medication and procedure side effects. Controlled Substances Monitoring: OARRS reviewed. Puja Johnston MD    NOTE: The above documentation was prepared using voice recognition software. Every attempt was made to ensure accuracy but there may be spelling, grammatical, and contextual errors.

## 2023-02-15 NOTE — TELEPHONE ENCOUNTER
No call, encounter opened to process CT Lung Screening. CT Lung Screen: 2/14/2023    Impression   1. There is no pulmonary infiltrate, mass or suspicious pulmonary nodule. 2. Emphysematous changes       LUNG RADS:   Per ACR Lung-RADS Version 1.1       Category 1, Negative (No nodules and definitely benign nodules). Management: Continue annual lung screening with LDCT in 12 months. RECOMMENDATIONS:   If you would like to [facility] your patient with the [facility], please contact the Nurse Navigator at   [phone]. Pack years: 27    Social History     Tobacco Use  Smoking Status: Current Every Day Smoker    Start Date:    Quit Date:    Types: Cigarettes   Packs/Day: 1   Years: 30   Pack Years: 27   Smokeless Tobacco: Never         Results letter sent to patient via my chart or mailed.      [address]

## 2023-02-15 NOTE — PROGRESS NOTES
[address]  [address] [facility], [address] [place] [address] Luke  [phone]    Follow up Note      Oscar River     Date of Visit:  2/15/2023    CC:  Patient presents for follow up   Chief Complaint   Patient presents with    Follow Up After Procedure     LEFT  LUMBAR MEDIAL BRANCH NERVE BLOCK UNDER FLUOROSCOPIC GUIDANCE AT L1, L2, L3 , L4 MB       HPI:  Chronic low back pain. Prior L4-L5-S1 interbody fusion > 10 yrs ago. Did well until recently. Left-sided low back pain- predominantly axial in nature. Has been evaluated : undergone CT abdomen and CT lumbar spine. Has been evaluated by NSG on 6/7/2022- no surgical indication. Pain causes functional limitations/ limits Adl's : Yes      Nursing notes and details of the pain history reviewed. Please see intake notes for details. Has been evaluated by [facility] spine - recommended facet interventions. Previous treatments:   Physical Therapy/ HEP : yes      Medications: - NSAID's : yes                         - Adjuvants or Others : yes      Lumbar spine Surgeries: yes, L4-5-S1 fusion     She has not been on anticoagulation medications      She has not been on herbal supplements. H/O Smoking: Yes  H/O alcohol abuse : no  H/O Illicit drug use : no     Employment: disability     Imaging:     XR LUMBAR MOTION 4V AP/LAT/ FLEX/EXT10/28/2022:  IMPRESSION:     Satisfactory postsurgical changes lower lumbar spine which is otherwise   unremarkable. Anatomic Variant: None. L4-5 is considered the level of the iliac crest   and assume there are 5 lumbar-type vertebrae. MRI of LS Spine: 5/31/2022:     FINDINGS:   BONES/ALIGNMENT: There is normal alignment of the spine. The vertebral body   heights are maintained. The bone marrow signal appears unremarkable.   Again   seen are postsurgical changes from prior posterior fusion instrumentation at   L4 through S1 with bilateral transpedicular screws and parallel interlocking rods.  Inter disc spacer at L4-5 and inter disc screw at L5-S1 again noted. SPINAL CORD:  The conus terminates normally. SOFT TISSUES: No abnormal enhancement is seen of the lumbar spine. No   paraspinal mass identified. L1-L2: There is no significant disc protrusion, spinal canal stenosis or   neural foraminal narrowing. L2-L3: There is no significant disc protrusion, spinal canal stenosis or   neural foraminal narrowing. L3-L4: Disc bulge and facet arthropathy contributing to mild bilateral   foraminal narrowing. No significant canal stenosis. L4-L5: There is no significant disc protrusion, spinal canal stenosis or   neural foraminal narrowing. L5-S1: There is no significant disc protrusion, spinal canal stenosis or   neural foraminal narrowing. Impression   1. Satisfactory alignment of the lumbar spine status post L4-S1 posterior   fusion instrumentation. 2. No significant canal or foraminal stenosis within the lumbar spine. X-ray LS spine flex ext: 3/3/2022:  Impression   Posterior fusion of the L4, L5, and S1 levels with satisfactory alignment and   no evidence of inducible instability in flexion or extension views. Narrowing of the neural foramina bilaterally at the L4/L5 and L5/S1 levels. Minimal degenerative changes seen within the sacroiliac joints bilaterally. CT lumbar spine: 1/11/2022:  FINDINGS:   There are bilateral pedicle screws at L4, L5, and S1 with intervening   posterior fusion bars. There is threaded fusion screw across the L5-S1   intervertebral disc. There is no evidence of acute fracture. Vertebral   alignment is anatomic. There are no compression deformities. There is right   hemilaminotomy at L4 and left hemilaminotomy at L5. There is bilateral facet   hypertrophy at L3-4 and L5-S1 as well as left facet hypertrophy at L4-5. The   paravertebral soft tissue structures are unremarkable.   There is no gross evidence of central canal stenosis or neural foraminal narrowing. There is a   left adrenal adenoma. The patient is status post cholecystectomy. There is   arteriosclerosis without abdominal aortic aneurysm. Impression   1. No acute fracture or subluxation. 2. Degenerative and postoperative changes in the lumbar spine without   evidence of central canal stenosis or definite neural foraminal narrowing. OARRS report[de-identified] reviewed.      Past Medical History:   Diagnosis Date    At high risk for deep venous thrombosis     Cancer (HCC)     cervical    COPD (chronic obstructive pulmonary disease) (HCC)     early stage    DJD (degenerative joint disease) of knee 07/18/2014    GERD (gastroesophageal reflux disease)     H/O cardiovascular stress test 05/13/2020    Lexiscan- negative; Dr. Kaylyn Maki last seen 5/22    Headache     Hiatal hernia     Mixed hyperlipidemia 10/25/2016    Sinus infection     SVT (supraventricular tachycardia) ([facility])     had episode of SVT 6 months ago and several small episode over last couple months       Past Surgical History:   Procedure Laterality Date    ABLATION OF DYSRHYTHMIC FOCUS  10/06/2020    Succesful Ablation AVN Reentry Tachycardia    (Dr. Kobe Pineda)    BACK SURGERY      lumbar pins & screws    BONE BIOPSY      CHOLECYSTECTOMY, LAPAROSCOPIC N/A 3/6/2020    CHOLECYSTECTOMY LAPAROSCOPIC performed by Irvin Cordero MD at [address] [place] [address], COLON, DIAGNOSTIC      KNEE ARTHROSCOPY Right 05/05/2017    KNEE SURGERY Left     arthroscopy    NERVE BLOCK Left 1/31/2023    LEFT  LUMBAR MEDIAL BRANCH NERVE BLOCK UNDER FLUOROSCOPIC GUIDANCE AT L1-2, L2-3, L3-4 facets performed by Sugey Archer MD at [address] [facility] N/A 3/6/2020    EGD ESOPHAGOGASTRODUODENOSCOPY performed by Irvin Cordero MD at [id number] [place] [address] [address]       Prior to Admission medications Medication Sig Start Date End Date Taking? Authorizing Provider   atorvastatin (LIPITOR) 20 MG tablet Take 1 tablet by mouth every evening Takes at [facility] 1/27/23  Yes PENNIE Beltran CNP   vitamin D (CHOLECALCIFEROL) 50 MCG (2000 UT) TABS tablet Take 1 tablet by mouth daily 1/27/23  Yes PENNIE Beltran CNP   fluticasone [facility]) 50 MCG/ACT nasal spray 2 sprays by Each Nostril route daily 1/24/23  Yes Denae Poole PA-C   gabapentin (NEURONTIN) 300 MG capsule daily. Pt has not begun medication 10/28/22  Yes Historical Provider, MD   SYMBICORT 160-4.5 MCG/ACT AERO daily am 11/22/22  Yes Historical Provider, MD   pantoprazole (PROTONIX) 40 MG tablet TAKE 1 TABLET BY MOUTH EVERY MORNING (BEFORE BREAKFAST)  Patient taking differently: Take 40 mg by mouth every morning (before breakfast) Takes at [facility] 11/2/22  Yes PENNIE Beltran CNP   Fluticasone-Umeclidin-Vilant (Vara Slate) 871-93.1-80 MCG/INH AEPB  9/20/22  Yes Historical Provider, MD   metoprolol succinate (TOPROL XL) 25 MG extended release tablet Take 1 tablet by mouth daily Take 1 tablet daily  Patient taking differently: Take 25 mg by mouth daily Take at [facility] 9/16/22  Yes Cristina Tenorio MD   ibuprofen (ADVIL;MOTRIN) 600 MG tablet TAKE 1 TABLET BY MOUTH EVERY 6 HOURS AS NEEDED FOR PAIN  Patient taking differently: Take 600 mg by mouth every 6 hours as needed for Pain Hold pre-op 7/25/22 2/15/23 Yes Laine Clifton,    PROAIR  (77 Base) MCG/ACT inhaler Inhale 2 puffs into the lungs every 6 hours as needed for Wheezing (or Cough spells) 2/23/22  Yes Laine Clifton DO       Allergies   Allergen Reactions    Cortisone Other (See Comments)     Redness and breaking into a sweat.     Medrol [Methylprednisolone]      Fever; redness; sweat    Bactrim [Sulfamethoxazole-Trimethoprim] Hives and Angioedema    Oxycodone Hcl     Oxycontin [Oxycodone Hcl] Itching    Penicillins      Cant remember reaction was from childhood Sulfa Antibiotics     Wellbutrin [Bupropion]        Social History     Socioeconomic History    Marital status:      Spouse name: Not on file    Number of children: Not on file    Years of education: Not on file    Highest education level: Not on file   Occupational History    Not on file   Tobacco Use    Smoking status: Every Day     Packs/day: 1.00     Years: 30.00     Pack years: 30.00     Types: Cigarettes    Smokeless tobacco: Never   Vaping Use    Vaping Use: Never used   Substance and Sexual Activity    Alcohol use: Yes     Comment: rarely    Drug use: No    Sexual activity: Yes   Other Topics Concern    Not on file   Social History Narrative    Not on file     Social Determinants of Health     Financial Resource Strain: Low Risk     Difficulty of Paying Living Expenses: Not hard at all   Food Insecurity: No Food Insecurity    Worried About Running Out of Food in the Last Year: Never true    Ran Out of Food in the Last Year: Never true   Transportation Needs: Not on file   Physical Activity: Not on file   Stress: Not on file   Social Connections: Not on file   Intimate Partner Violence: Not on file   Housing Stability: Not on file       Family History   Problem Relation Age of Onset    High Blood Pressure Mother     High Blood Pressure Father        REVIEW OF SYSTEMS:     Tracee Hoyt denies fever/chills, chest pain, shortness of breath, new bowel or bladder complaints. All other review of systems was negative.     PHYSICAL EXAMINATION:      BP (!) 138/92   Temp 97.3 °F (36.3 °C) (Infrared)   Ht 5' 8\" (1.727 m)   Wt 201 lb (91.2 kg)   LMP  (LMP Unknown)   BMI 30.56 kg/m²   General:       General appearance:  Pleasant and well-hydrated, in no distress and A & O x 3  Build:Overweight  Function: Rises from seated position easily and Moves about room without difficulty     HEENT:     Head:normocephalic, atraumatic     Lungs:     Breathing:normal breathing pattern      CVS:     RRR     Abdomen: Shape:non-distended and normal     Cervical spine:     Inspection:normal     Thoracic spine:                Spine inspection:normal   Palpation:No tenderness over the midline and paraspinal area, bilaterally  Range of motion:normal in flexion, extension rotation bilateral and is not painful. Lumbar spine:     Spine inspection: Scar from the prior surgery noted healed well  Palpation: Tenderness paravertebral muscles Yes   Range of motion: Decreased, flexion Decreased, Lateral bending, extension and rotation bilaterally reduced is somewhat painful. Left lumbar facet tenderness + above the fusion. Sacroiliac joint tenderness Yes left  SLR : negative bilaterally      Musculoskeletal:     Trigger points no     Extremities:     Tremors:None bilaterally upper and lower  Edema:no     Neurological:     Sensory: Normal to light touch      Motor:                   Right Quadriceps 5/5          Left Quadriceps 5/5           Right Gastrocnemius 5/5    Left Gastrocnemius 5/5  Right Ant Tibialis 5/5  Left Ant Tibialis 5/5     Dermatology:     Skin:no rashes or lesions noted    Assessment/Plan:   Diagnosis Orders   1. Lumbosacral spondylosis without myelopathy        2. DDD (degenerative disc disease), lumbar          62 y.o.  female with H/o previous lumbar spine fusion surgery at L4 L5-S1 > 10 years ago. H/o low back pain. Denies lower extremity radiation or weakness or numbness. Recent CT lumbar spine/ MRI of LS spine reviewed and discussed with the patient. CT abdomen findings reviewed: Incidental diverticulosis, Has been evaluated by Gen surgery- treated with antibiotics- improved abdominal pain. Does not have abdominal pain now. Has also been evaluated by NS had MRI. No surgical interventions. Has tenderness over the left lumbar facet and left SIJ. Has been evaluated by [facility] spine and recommended facet interventions. Recent Xray / MRI/ pertinent images/ consult notes reviewed.     S/P # 1 lumbar MBNB with > 90% relief for short duration. Has recurrence of similar pain. Doing HEP. Recommend:    # 2 LEFT  LUMBAR MEDIAL BRANCH NERVE BLOCK UNDER FLUOROSCOPIC GUIDANCE AT L1-2, L2-3, L3-4 facets (ABOVE THE LEVEL OF FUSION). If short term relief will do RFA. Moderate sedation due to H/o anxiety of needles. Counseling :Patient encouraged to stay active and to continue Regular home exercise program as tolerated - stretching / strengthening. Smoking cessation counseling : yes      Treatment plan discussed with the patient including medication and procedure side effects. Controlled Substances Monitoring: OARRS reviewed. Elizabeth Funez MD    NOTE: The above documentation was prepared using voice recognition software. Every attempt was made to ensure accuracy but there may be spelling, grammatical, and contextual errors.

## 2023-02-15 NOTE — PROGRESS NOTES
Do you currently have any of the following:    Fever: No  Headache:  No  Cough: No  Shortness of breath: No  Exposed to anyone with these symptoms: No                                                                                                                D[place] River presents to the [facility] on 2/15/2023. Sahil Lockwood is complaining of pain in back. The pain is constant. The pain is described as aching. Pain is rated on her best day at a 5, on her worst day at a 10, and on average at a 7 on the VAS scale. She took her last dose of Neurontin 2 days ago. Sahil Lockwood does not have issues with constipation. Any procedures since your last visit: Yes, with 100 % relief. She is not on NSAIDS and  is not on anticoagulation medications to include none and is managed by none. Pacemaker or defibrillator: No.    Medication Contract and Consent for Opioid Use Documents Filed        No documents found                       Ht 5' 8\" (1.727 m)   Wt 201 lb (91.2 kg)   LMP  (LMP Unknown)   BMI 30.56 kg/m²      No LMP recorded (lmp unknown).  Patient is postmenopausal.

## 2023-03-07 ENCOUNTER — TELEPHONE (OUTPATIENT)
Dept: PAIN MANAGEMENT | Age: 58
End: 2023-03-07

## 2023-03-07 NOTE — TELEPHONE ENCOUNTER
Call to Rakan Licona that procedure was approved for 3/14/2023 and that the surgery center should call her a few days before for the pre op call and between 2:00 PM and 4:00 PM  the business day before with the arrival time. Instructed Niecy to hold ibuprofen for 24 hours, naprosyn for 4 days and any aspirin containing products or fish oil for 7 days. Instructed to call office back if any questions. Rebel Allison verbalized understanding.     Electronically signed by Chinmay Xiong RN on 3/7/2023 at 10:41 AM

## 2023-03-13 ENCOUNTER — ANESTHESIA EVENT (OUTPATIENT)
Dept: OPERATING ROOM | Age: 58
End: 2023-03-13
Payer: MEDICARE

## 2023-03-13 ASSESSMENT — ENCOUNTER SYMPTOMS: SHORTNESS OF BREATH: 1

## 2023-03-13 ASSESSMENT — LIFESTYLE VARIABLES: SMOKING_STATUS: 1

## 2023-03-13 NOTE — ANESTHESIA PRE PROCEDURE
Department of Anesthesiology  Preprocedure Note       Name:  Brigida Alonzo   Age:  62 y.o.  :  1965                                          MRN:  [id number]         Date:  3/13/2023      Surgeon: Shanell Trinh):  Shaquille Le MD    Procedure: # 2 LEFT  LUMBARMEDIAL BRANCH NERVE BLOCK UNDER FLUOROSCOPIC GUIDANCE AT L1, L2, L3, and L4 (Left)    Medications prior to admission:   Prior to Admission medications    Medication Sig Start Date End Date Taking? Authorizing Provider   atorvastatin (LIPITOR) 20 MG tablet Take 1 tablet by mouth every evening Takes at [facility] 23   Maria Teresa Barnes APRN - CNP   vitamin D (CHOLECALCIFEROL) 50 MCG (2000) TABS tablet Take 1 tablet by mouth daily 23   PENNIE London - CNP   fluticasone [facility]) 50 MCG/ACT nasal spray 2 sprays by Each Nostril route daily 23   Denae Poole PA-C   gabapentin (NEURONTIN) 300 MG capsule nightly.  Pt has not begun medication 10/28/22   Historical Provider, MD   SYMBICORT 160-4.5 MCG/ACT AERO daily am 22   Historical Provider, MD   pantoprazole (PROTONIX) 40 MG tablet TAKE 1 TABLET BY MOUTH EVERY MORNING (BEFORE BREAKFAST)  Patient taking differently: Take 40 mg by mouth every morning (before breakfast) Takes at [facility] 22   PENNIE London - CNP   Fluticasone-Umeclidin-Vilant (Duane Holland) 266-13.1-48 MCG/INH AEPB  22   Historical Provider, MD   metoprolol succinate (TOPROL XL) 25 MG extended release tablet Take 1 tablet by mouth daily Take 1 tablet daily  Patient taking differently: Take 25 mg by mouth daily Take at [facility] 22   Jade Rubin MD   ibuprofen (ADVIL;MOTRIN) 600 MG tablet TAKE 1 TABLET BY MOUTH EVERY 6 HOURS AS NEEDED FOR PAIN  Patient taking differently: Take 600 mg by mouth every 6 hours as needed for Pain Hold pre-op 7/25/22 2/15/23  Atr Driscoll DO   PROAIR  (90 Base) MCG/ACT inhaler Inhale 2 puffs into the lungs every 6 hours as needed for Wheezing (or Cough spells)  Patient taking differently: Inhale 2 puffs into the lungs every 6 hours as needed for Wheezing (or Cough spells) 2/23/23 - last taken 2/23/22   Patricia Swanson DO       Current medications:    Current Outpatient Medications   Medication Sig Dispense Refill    atorvastatin (LIPITOR) 20 MG tablet Take 1 tablet by mouth every evening Takes at 4PM 90 tablet 0    vitamin D (CHOLECALCIFEROL) 50 MCG (2000 UT) TABS tablet Take 1 tablet by mouth daily 90 tablet 0    fluticasone (FLONASE) 50 MCG/ACT nasal spray 2 sprays by Each Nostril route daily 16 g 0    gabapentin (NEURONTIN) 300 MG capsule nightly. Pt has not begun medication      SYMBICORT 160-4.5 MCG/ACT AERO daily am      pantoprazole (PROTONIX) 40 MG tablet TAKE 1 TABLET BY MOUTH EVERY MORNING (BEFORE BREAKFAST) (Patient taking differently: Take 40 mg by mouth every morning (before breakfast) Takes at 4PM) 30 tablet 3    Fluticasone-Umeclidin-Vilant (TRELEGY ELLIPTA) 200-62.5-25 MCG/INH AEPB  (Patient not taking: Reported on 3/9/2023)      metoprolol succinate (TOPROL XL) 25 MG extended release tablet Take 1 tablet by mouth daily Take 1 tablet daily (Patient taking differently: Take 25 mg by mouth daily Take at 4PM) 30 tablet 5    ibuprofen (ADVIL;MOTRIN) 600 MG tablet TAKE 1 TABLET BY MOUTH EVERY 6 HOURS AS NEEDED FOR PAIN (Patient taking differently: Take 600 mg by mouth every 6 hours as needed for Pain Hold pre-op) 20 tablet 0    PROAIR  (90 Base) MCG/ACT inhaler Inhale 2 puffs into the lungs every 6 hours as needed for Wheezing (or Cough spells) (Patient taking differently: Inhale 2 puffs into the lungs every 6 hours as needed for Wheezing (or Cough spells) 2/23/23 - last taken) 8.5 g 3     No current facility-administered medications for this visit. Allergies: Allergies   Allergen Reactions    Cortisone Other (See Comments)     Redness and breaking into a sweat.    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    DDD (degenerative disc disease), lumbar M51.36    Elbow pain M25.529    Lateral epicondylitis M77.10    SVT (supraventricular tachycardia) ([facility]) I47.1    Palpitations R00.2    Primary osteoarthritis of left knee M17.12    Mixed hyperlipidemia E78.2    Vitamin D deficiency E55.9    Bronchitis, acute, with bronchospasm J20.9    Exertional dyspnea R06.09    Primary osteoarthritis of right knee M17.11    Acute medial meniscal tear S83.249A    Complex tear of lateral meniscus of right knee as current injury S83.271A    Synovitis M65.9    Chronic obstructive pulmonary disease (HCC) J44.9    Atypical chest pain R07.89    Symptomatic cholelithiasis K80.20    Gastroesophageal reflux disease without esophagitis K21.9    Chest pain R07.9    Lumbosacral spondylosis without myelopathy M47.817    Lumbar spondylosis M47.816       Past Medical History:        Diagnosis Date    At high risk for deep venous thrombosis     states\" dad has a gene for blood clotting\"    Cancer ([facility] [address]     cervical    COPD (chronic obstructive pulmonary disease) ([facility] [address]     early stage    COVID-19     Twice:11/2020; 2/2022- fatigue, dyspnea, nasal congestion- 5 days duration    DJD (degenerative joint disease) of knee 07/18/2014    GERD (gastroesophageal reflux disease)     H/O cardiovascular stress test 05/13/2020    Lexiscan- negative; Dr. Paola Schaffer last seen 5/22    Headache     Hiatal hernia     Mixed hyperlipidemia 10/25/2016    Sinus infection     SVT (supraventricular tachycardia) ([facility])     had episode of SVT 6 months ago and several small episode over last couple months       Past Surgical History:        Procedure Laterality Date    ABLATION OF DYSRHYTHMIC FOCUS  10/06/2020    Succesful Ablation AVN Reentry Tachycardia    (Dr. Joselyn Lagos)    BACK SURGERY      lumbar pins & screws    BONE BIOPSY      CHOLECYSTECTOMY, LAPAROSCOPIC N/A 3/6/2020    CHOLECYSTECTOMY LAPAROSCOPIC performed by Guy Kayser, MD at [facility]      ENDOSCOPY, COLON, DIAGNOSTIC      KNEE ARTHROSCOPY Right 05/05/2017    KNEE SURGERY Left     arthroscopy    NERVE BLOCK Left 1/31/2023    LEFT  LUMBAR MEDIAL BRANCH NERVE BLOCK UNDER FLUOROSCOPIC GUIDANCE AT L1-2, L2-3, L3-4 facets performed by Shea Martinez MD at [address] [facility] [address]      UPPER GASTROINTESTINAL ENDOSCOPY N/A 3/6/2020    EGD ESOPHAGOGASTRODUODENOSCOPY performed by Guy Kayser, MD at [address] History:    Social History     Tobacco Use    Smoking status: Every Day     Packs/day: 1.00     Years: 30.00     Pack years: 30.00     Types: Cigarettes    Smokeless tobacco: Never   Substance Use Topics    Alcohol use: Yes     Comment: rarely                                Ready to quit: Not Answered  Counseling given: Not Answered      Vital Signs (Current): There were no vitals filed for this visit.                                            BP Readings from Last 3 Encounters:   02/15/23 (!) 138/92   01/31/23 (!) 160/77   01/27/23 118/80       NPO Status:  >8.H                                                                               BMI:   Wt Readings from Last 3 Encounters:   02/15/23 201 lb (91.2 kg)   01/31/23 201 lb (91.2 kg)   01/27/23 202 lb (91.6 kg)     There is no height or weight on file to calculate BMI.    CBC:   Lab Results   Component Value Date/Time    WBC 5.3 01/24/2023 03:12 PM    RBC 4.74 01/24/2023 03:12 PM    HGB 14.6 01/24/2023 03:12 PM    HCT 45.2 01/24/2023 03:12 PM    MCV 95.4 01/24/2023 03:12 PM    RDW 12.5 01/24/2023 03:12 PM     01/24/2023 03:12 PM       CMP:   Lab Results   Component Value Date/Time     01/24/2023 03:12 PM    K 4.2 01/24/2023 03:12 PM     01/24/2023 03:12 PM    CO2 27 01/24/2023 03:12 PM    BUN 12 01/24/2023 03:12 PM    CREATININE 0.7 01/24/2023 03:12 PM    GFRAA >60 10/09/2022 02:42 PM    LABGLOM >60 01/24/2023 03:12 PM    GLUCOSE 100 01/24/2023 03:12 PM    GLUCOSE 95 04/22/2012 03:55 PM    PROT 6.8 01/24/2023 03:12 PM    CALCIUM 9.3 01/24/2023 03:12 PM    BILITOT 0.3 01/24/2023 03:12 PM    ALKPHOS 106 01/24/2023 03:12 PM    AST 24 01/24/2023 03:12 PM    ALT 31 01/24/2023 03:12 PM       POC Tests: No results for input(s): POCGLU, POCNA, POCK, POCCL, POCBUN, POCHEMO, POCHCT in the last 72 hours. Coags:   Lab Results   Component Value Date/Time    PROTIME 9.9 09/26/2020 11:18 PM    INR 0.9 09/26/2020 11:18 PM       HCG (If Applicable):   Lab Results   Component Value Date    PREGTESTUR NEGATIVE 01/02/2022        ABGs: No results found for: PHART, PO2ART, XQC7PZR, AKW7UMJ, BEART, F6CSPCBQ     Type & Screen (If Applicable):  No results found for: LABABO, LABRH     12 Lead EKG 3/5/20 HR 74  Narrative & Impression     Normal sinus rhythm  Cannot rule out Anterior infarct (cited on or before 10-JUL-2019)  Abnormal ECG  When compared with ECG of 10-JUL-2019 15:32,  No significant change was found     Echo 12/19/17   Findings      Left Ventricle   Left ventricular internal dimensions were normal in diastole and systole. No regional wall motion abnormalities seen. Normal left ventricular ejection fraction. Ejection fraction is visually estimated at 65%. Normal left ventricular diastolic filling pattern for age. Right Ventricle   Normal right ventricular size and function. Left Atrium   Normal sized left atrium. Interatrial septum appears intact. Right Atrium   Normal right atrium size.       Mitral Valve   Normal mitral valve structure and function. Tricuspid Valve   Normal tricuspid valve structure and function. Aortic Valve   Structurally normal aortic valve. Pulmonic Valve   Pulmonic valve is structurally normal.      Pericardial Effusion   No evidence of pericardial effusion. Aorta   Aortic root dimension within normal limits. Conclusions      Summary   Normal transthoracic echocardiogram      Signature    CXR 7/10/19  Findings: The cardiomediastinal silhouette is stable in size and contours. Bilateral lungs and costophrenic angles are clear. There is no   evidence of pneumothorax.            Impression   No acute cardiopulmonary disease. Anesthesia Evaluation  Patient summary reviewed and Nursing notes reviewed no history of anesthetic complications:   Airway: Mallampati: III  TM distance: <3 FB   Neck ROM: full  Mouth opening: < 3 FB   Dental:    (+) partials and upper dentures  Comment: Partial on bottom molars  Top denture    Pulmonary: breath sounds clear to auscultation  (+) COPD:  shortness of breath: chronic,  current smoker (1ppd)          Patient did not smoke on day of surgery. Cardiovascular:  Exercise tolerance: poor (<4 METS),   (+) dysrhythmias (Takes Metoprolol as needed when in SVT, last episode about 1 year ago): SVT, [place]:, hyperlipidemia        Rhythm: regular  Rate: normal           Beta Blocker:  Not on Beta Blocker         Neuro/Psych:   (+) neuromuscular disease (Numbness in hands):, headaches: migraine headaches,              ROS comment: DJD GI/Hepatic/Renal:   (+) hiatal hernia, GERD: poorly controlled,          ROS comment: For lap ricky and EGD 3/6/20. Endo/Other:    (+) : arthritis (OA throughout body): OA., malignancy/cancer (cervical - removed surgically, no chemo). Pt had no PAT visit       Abdominal:             Vascular: negative vascular ROS.          Other Findings:             Anesthesia Plan      MAC ASA 3       Induction: intravenous. Anesthetic plan and risks discussed with patient. Plan discussed with CRNA. History, data and pertinent studies from chart review. Above represents information available via the shared medical record including previous anesthetic, medication and allergy history. Confirmation of above and final disposition per DOS anesthesiologist.  Isabelle Nicole MD  9:01 AM    DOS STAFF ADDENDUM:    Patient seen and examined, physical exam updated as needed, chart reviewed. NPO status confirmed. Anesthetic options and risks discussed with patient/legal guardian. Patient/legal guardian verbalized understanding and agrees to proceed.      Zoran Middleton MD  Staff Anesthesiologist  March 14, 2023  8:36 AM        Inge Montano

## 2023-03-14 ENCOUNTER — ANESTHESIA (OUTPATIENT)
Dept: OPERATING ROOM | Age: 58
End: 2023-03-14
Payer: MEDICARE

## 2023-03-14 ENCOUNTER — HOSPITAL ENCOUNTER (OUTPATIENT)
Age: 58
Setting detail: OUTPATIENT SURGERY
Discharge: HOME | End: 2023-03-14
Attending: ANESTHESIOLOGY | Admitting: ANESTHESIOLOGY
Payer: MEDICARE

## 2023-03-14 ENCOUNTER — HOSPITAL ENCOUNTER (OUTPATIENT)
Dept: OPERATING ROOM | Age: 58
Setting detail: OUTPATIENT SURGERY
Discharge: HOME | End: 2023-03-14
Attending: ANESTHESIOLOGY
Payer: MEDICARE

## 2023-03-14 VITALS
HEIGHT: 68 IN | HEART RATE: 78 BPM | BODY MASS INDEX: 30.62 KG/M2 | RESPIRATION RATE: 16 BRPM | OXYGEN SATURATION: 95 % | DIASTOLIC BLOOD PRESSURE: 62 MMHG | WEIGHT: 202 LBS | SYSTOLIC BLOOD PRESSURE: 119 MMHG | TEMPERATURE: 98 F

## 2023-03-14 DIAGNOSIS — M47.896 OTHER OSTEOARTHRITIS OF SPINE, LUMBAR REGION: ICD-10-CM

## 2023-03-14 PROCEDURE — 3600000005 HC SURGERY LEVEL 5 BASE: Performed by: ANESTHESIOLOGY

## 2023-03-14 PROCEDURE — 3209999900 FLUORO FOR SURGICAL PROCEDURES

## 2023-03-14 PROCEDURE — 3700000000 HC ANESTHESIA ATTENDED CARE: Performed by: ANESTHESIOLOGY

## 2023-03-14 PROCEDURE — 2580000003 HC RX 258: Performed by: ANESTHESIOLOGY

## 2023-03-14 PROCEDURE — 6360000002 HC RX W HCPCS: Performed by: ANESTHESIOLOGY

## 2023-03-14 PROCEDURE — 2500000003 HC RX 250 WO HCPCS: Performed by: ANESTHESIOLOGY

## 2023-03-14 PROCEDURE — 7100000010 HC PHASE II RECOVERY - FIRST 15 MIN: Performed by: ANESTHESIOLOGY

## 2023-03-14 PROCEDURE — 6360000002 HC RX W HCPCS

## 2023-03-14 PROCEDURE — 2709999900 HC NON-CHARGEABLE SUPPLY: Performed by: ANESTHESIOLOGY

## 2023-03-14 PROCEDURE — 7100000011 HC PHASE II RECOVERY - ADDTL 15 MIN: Performed by: ANESTHESIOLOGY

## 2023-03-14 RX ORDER — BUPIVACAINE HYDROCHLORIDE 5 MG/ML
INJECTION, SOLUTION PERINEURAL PRN
Status: DISCONTINUED | OUTPATIENT
Start: 2023-03-14 | End: 2023-03-14 | Stop reason: ALTCHOICE

## 2023-03-14 RX ORDER — SODIUM CHLORIDE 9 MG/ML
INJECTION, SOLUTION INTRAVENOUS PRN
Status: DISCONTINUED | OUTPATIENT
Start: 2023-03-14 | End: 2023-03-14 | Stop reason: HOSPADM

## 2023-03-14 RX ORDER — SODIUM CHLORIDE, SODIUM LACTATE, POTASSIUM CHLORIDE, CALCIUM CHLORIDE 600; 310; 30; 20 MG/100ML; MG/100ML; MG/100ML; MG/100ML
INJECTION, SOLUTION INTRAVENOUS CONTINUOUS
Status: DISCONTINUED | OUTPATIENT
Start: 2023-03-14 | End: 2023-03-14 | Stop reason: HOSPADM

## 2023-03-14 RX ORDER — LIDOCAINE HYDROCHLORIDE 5 MG/ML
INJECTION, SOLUTION INFILTRATION; INTRAVENOUS PRN
Status: DISCONTINUED | OUTPATIENT
Start: 2023-03-14 | End: 2023-03-14 | Stop reason: ALTCHOICE

## 2023-03-14 RX ORDER — SODIUM CHLORIDE 0.9 % (FLUSH) 0.9 %
5-40 SYRINGE (ML) INJECTION EVERY 12 HOURS SCHEDULED
Status: DISCONTINUED | OUTPATIENT
Start: 2023-03-14 | End: 2023-03-14 | Stop reason: HOSPADM

## 2023-03-14 RX ORDER — SODIUM CHLORIDE 0.9 % (FLUSH) 0.9 %
5-40 SYRINGE (ML) INJECTION PRN
Status: DISCONTINUED | OUTPATIENT
Start: 2023-03-14 | End: 2023-03-14 | Stop reason: HOSPADM

## 2023-03-14 RX ORDER — FENTANYL CITRATE 50 UG/ML
INJECTION, SOLUTION INTRAMUSCULAR; INTRAVENOUS PRN
Status: DISCONTINUED | OUTPATIENT
Start: 2023-03-14 | End: 2023-03-14 | Stop reason: SDUPTHER

## 2023-03-14 RX ORDER — DEXAMETHASONE SODIUM PHOSPHATE 10 MG/ML
INJECTION, SOLUTION INTRAMUSCULAR; INTRAVENOUS PRN
Status: DISCONTINUED | OUTPATIENT
Start: 2023-03-14 | End: 2023-03-14 | Stop reason: ALTCHOICE

## 2023-03-14 RX ORDER — MIDAZOLAM HYDROCHLORIDE 1 MG/ML
INJECTION INTRAMUSCULAR; INTRAVENOUS PRN
Status: DISCONTINUED | OUTPATIENT
Start: 2023-03-14 | End: 2023-03-14 | Stop reason: SDUPTHER

## 2023-03-14 RX ADMIN — SODIUM CHLORIDE, POTASSIUM CHLORIDE, SODIUM LACTATE AND CALCIUM CHLORIDE: 600; 310; 30; 20 INJECTION, SOLUTION INTRAVENOUS at 09:03

## 2023-03-14 RX ADMIN — MIDAZOLAM 2 MG: 1 INJECTION INTRAMUSCULAR; INTRAVENOUS at 10:23

## 2023-03-14 RX ADMIN — MIDAZOLAM 2 MG: 1 INJECTION INTRAMUSCULAR; INTRAVENOUS at 10:15

## 2023-03-14 RX ADMIN — FENTANYL CITRATE 50 MCG: 50 INJECTION INTRAMUSCULAR; INTRAVENOUS at 10:23

## 2023-03-14 RX ADMIN — FENTANYL CITRATE 50 MCG: 50 INJECTION INTRAMUSCULAR; INTRAVENOUS at 10:15

## 2023-03-14 RX ADMIN — FENTANYL CITRATE 50 MCG: 50 INJECTION INTRAMUSCULAR; INTRAVENOUS at 10:12

## 2023-03-14 ASSESSMENT — PAIN SCALES - GENERAL
PAINLEVEL_OUTOF10: 0

## 2023-03-14 ASSESSMENT — PAIN - FUNCTIONAL ASSESSMENT
PAIN_FUNCTIONAL_ASSESSMENT: PREVENTS OR INTERFERES SOME ACTIVE ACTIVITIES AND ADLS
PAIN_FUNCTIONAL_ASSESSMENT: 0-10

## 2023-03-14 ASSESSMENT — PAIN DESCRIPTION - DESCRIPTORS: DESCRIPTORS: ACHING;BURNING;NAGGING

## 2023-03-14 NOTE — INTERVAL H&P NOTE
Update History & Physical    The patient's History and Physical of February 15, 2023 was reviewed with the patient and I examined the patient. There was no change. The surgical site was confirmed by the patient and me.     Plan: The risks, benefits, expected outcome, and alternative to the recommended procedure have been discussed with the patient. Patient understands and wants to proceed with the procedure.     Electronically signed by Casa Lobo MD on 3/14/2023

## 2023-03-14 NOTE — PROGRESS NOTES
[facility]. Put on Z PACK and Medrol dose pack. Feeling better today

## 2023-03-14 NOTE — ANESTHESIA POSTPROCEDURE EVALUATION
Department of Anesthesiology  Postprocedure Note    Patient: Mayuri Laboy  MRN: [id number]  YOB: 1965  Date of evaluation: 3/14/2023      Procedure Summary     Date: 03/14/23 Room / Location: [address] [place] [address] / [address] [place] [address]    Anesthesia Start: 2691 Anesthesia Stop: 1027    Procedure: # 2 LEFT  [address] AT L1, L2, L3, and L4 (Left) Diagnosis:       Lumbar spondylosis      (Lumbar spondylosis [H93.848])    Surgeons: Radha Cobb MD Responsible Provider: Arian Vazquez MD    Anesthesia Type: MAC ASA Status: 3          Anesthesia Type: No value filed.     Steven Phase I: Steven Score: 10    Steven Phase II: Steven Score: 10      Anesthesia Post Evaluation    Patient location during evaluation: PACU  Patient participation: complete - patient participated  Level of consciousness: awake and alert  Airway patency: patent  Nausea & Vomiting: no nausea and no vomiting  Complications: no  Cardiovascular status: hemodynamically stable  Respiratory status: acceptable  Hydration status: euvolemic  Multimodal analgesia pain management approach

## 2023-03-14 NOTE — OP NOTE
Operative Note      Patient: Derik Sol  YOB: 1965  MRN: [id number]    Date of Procedure: 3/14/2023    Pre-Op Diagnosis: Lumbar spondylosis [M47.816]    Post-Op Diagnosis: Same       Procedure(s):  # 2 LEFT  LUMBARMEDIAL BRANCH NERVE BLOCK UNDER FLUOROSCOPIC GUIDANCE AT L1, L2, L3, and L4    Surgeon(s):  Holger Duarte MD    Assistant:   * No surgical staff found *    Anesthesia: Monitor Anesthesia Care    Estimated Blood Loss (mL): Minimal    Complications: None    Specimens:   * No specimens in log *    Implants:  * No implants in log *      Drains: * No LDAs found *    Findings: good needle placement    Detailed Description of Procedure:   3/14/2023    Patient: Derik Sol  :  1965  Age:  62 y.o. Sex:  female     PRE-OPERATIVE DIAGNOSIS:  Lumbar spondylosis, lumbar facet syndrome. POST-OPERATIVE DIAGNOSIS: Same. PROCEDURE:  # 2 Fluoroscopic guided lumbar medial branch blocks Left at Levels: L1, L2, L3 & L4     SURGEON: Holger Duarte MD    ANESTHESIA: MAC    ESTIMATED BLOOD LOSS: None.  ______________________________________________________________________  BRIEF HISTORY:  Elise River comes in today for 2 fluoroscopic guided lumbar medial branch blocks. The potential complications of this procedure were discussed with her again today. She has elected to undergo the aforementioned procedure. Rebeka complete History & Physical examination were reviewed in depth, a copy of which is in the chart. DESCRIPTION OF PROCEDURE:   After confirming written and informed consent, a time-out was performed and Rebeka name and date of birth, the procedure to be performed as well as the plan for the location of the needle insertion were confirmed. The patient was brought into the procedure room and placed in the prone position on the fluoroscopy table. Standard monitors were placed and vital signs were observed throughout the procedure.  The area of the lumbar spine was prepped with chloraprep and draped in a sterile manner. AP fluoroscopy was used to identify and nelson bartons point at the targeted levels. The skin and subcutaneous tissues in these identified areas were anesthetized with 0.5%Lidocaine. A 22 # gauge 3.5 inch spinal needles was advanced toward the junction of the superior articular process and the transverse process, along the course of the medial branch. Satisfactory needle placement was confirmed by AP and oblique projections. At the sacral alar level, the sacral alar region was visualized and the needle tip was positioned on the sacral alar at the base of the superior articulating process where the medial branch traverses under fluoroscopic guidance. Once bone was contacted and negative aspiration was confirmed. A solution of 0.5% marcaine with 1 mg Dexamethasone 1 cc was then injected  at each level. Following the procedure Derick Alfonso noted improvement of previous pain symptoms. Disposition the patient tolerated the procedure well and there were no complications . Vital signs remained stable throughout the procedure. The patient was escorted to the recovery area where they remained until discharge and written discharge instructions for the procedure were given. Plan: Derick Alfonso will return to our pain management center as scheduled. Noted excellent pain relief immediately. Will plan RFA.       Dalton Enriquez MD

## 2023-03-14 NOTE — DISCHARGE INSTRUCTIONS
[facility]) Pain Management Department  [phone]   Post-Pain Block/ Radiofrequency Home Going Instructions    1-Go home, rest for the remainder of the day  2-Please do not lift over 20 pounds the day of the injection  3-If you received sedation No: alcohol, driving, operating lawn mowers, plows, tractors or other dangerous equipment until next morning. Do not make important decisions or sign legal documents for 24 hours. You may experience light headedness, dizziness, nausea or sleepiness after sedation. Do not stay alone. A responsible adult must be with you for 24 hours. You could be nauseated from the medications you have received. Your IV site may be sore and bruised. 4-No dietary restrictions     5-Resume all medications the same day, blood thinners to be resumed 24 hours after injection    6-Keep the surgical site clean and dry, you may shower the next morning and remove the      dressing. 7- No sitz baths, tub baths or hot tubs/swimming for 24 hours. 8- If you have any pain at the injection site(s), application of an ice pack to the area should be       helpful, 20 minutes on/20 minutes off for next 48 hours. 9- [facility] at if you develop. Fever greater than 100.4 F  Have bleeding or drainage from the puncture site  Have progressive Leg/arm numbness and or weakness  Loss of control of bowel and or bladder (wet/soil yourself)  Severe headache with inability to lift head  10-You may return to work the next day      Infection After Surgery: Care Instructions  Overview  After surgery, an infection is always possible. It doesn't mean that the surgery didn't go well. Because an infection can be serious, your doctor has taken steps to manage it. Your doctor checked the infection and cleaned it if necessary. Your doctor may have made an opening in the area so that the pus can drain out. You may have gauze in the cut so that the area will stay open and keep draining. You may need antibiotics. You will need to follow up with your doctor to make sure the infection has gone away. Follow-up care is a key part of your treatment and safety. Be sure to make and go to all appointments, and call your doctor if you are having problems. It's also a good idea to know your test results and keep a list of the medicines you take. How can you care for yourself at home? Make sure your surgeon knows about the infection, especially if you saw another doctor about your symptoms. If your doctor prescribed antibiotics, take them as directed. Do not stop taking them just because you feel better. You need to take the full course of antibiotics. Ask your doctor if you can take an over-the-counter pain medicine, such as acetaminophen (Tylenol), ibuprofen (Advil, Motrin), or naproxen (Aleve). Be safe with medicines. Read and follow all instructions on the label. Do not take two or more pain medicines at the same time unless the doctor told you to. Many pain medicines have acetaminophen, which is Tylenol. Too much acetaminophen (Tylenol) can be harmful. Prop up the area on a pillow anytime you sit or lie down during the next 3 days. Try to keep it above the level of your heart. This will help reduce swelling. Keep the skin clean and dry. You may have a bandage over the cut (incision). A bandage helps the incision heal and protects it. Your doctor will tell you how to take care of this. Keep it clean and dry. You may have drainage from the wound. If your doctor told you how to care for your incision, follow your doctor's instructions. If you did not get instructions, follow this general advice:  Wash around the incision with clean water 2 times a day. Don't use hydrogen peroxide or alcohol, which can slow healing. When should you call for help? Call your doctor now or seek immediate medical care if:    You have signs that your infection is getting worse, such as:   Increased pain, swelling, warmth, or redness in the area. Red streaks leading from the area. Pus draining from the wound. A new or higher fever. Watch closely for changes in your health, and be sure to contact your doctor if you have any problems. Where can you learn more? Go to http://www.woods.com/ and enter C340 to learn more about \"Infection After Surgery: Care Instructions. \"  Current as of: January 20, 2022               Content Version: 13.5  © 2006-2022 [employer]. Care instructions adapted under license by [facility] ([facility]). If you have questions about a medical condition or this instruction, always ask your healthcare professional. [address] any warranty or liability for your use of this information.

## 2023-03-20 ENCOUNTER — OFFICE VISIT (OUTPATIENT)
Dept: FAMILY MEDICINE CLINIC | Age: 58
End: 2023-03-20
Payer: MEDICARE

## 2023-03-20 VITALS
TEMPERATURE: 97.8 F | BODY MASS INDEX: 30.8 KG/M2 | HEART RATE: 84 BPM | RESPIRATION RATE: 16 BRPM | OXYGEN SATURATION: 96 % | WEIGHT: 203.2 LBS | DIASTOLIC BLOOD PRESSURE: 84 MMHG | HEIGHT: 68 IN | SYSTOLIC BLOOD PRESSURE: 136 MMHG

## 2023-03-20 DIAGNOSIS — Z12.39 ENCOUNTER FOR SCREENING FOR MALIGNANT NEOPLASM OF BREAST, UNSPECIFIED SCREENING MODALITY: Primary | ICD-10-CM

## 2023-03-20 DIAGNOSIS — Z00.00 INITIAL MEDICARE ANNUAL WELLNESS VISIT: ICD-10-CM

## 2023-03-20 DIAGNOSIS — K57.32 DIVERTICULITIS OF LARGE INTESTINE WITHOUT PERFORATION OR ABSCESS WITHOUT BLEEDING: ICD-10-CM

## 2023-03-20 DIAGNOSIS — Z12.31 SCREENING MAMMOGRAM FOR BREAST CANCER: ICD-10-CM

## 2023-03-20 PROCEDURE — G8484 FLU IMMUNIZE NO ADMIN: HCPCS | Performed by: FAMILY MEDICINE

## 2023-03-20 PROCEDURE — 3017F COLORECTAL CA SCREEN DOC REV: CPT | Performed by: FAMILY MEDICINE

## 2023-03-20 PROCEDURE — G0438 PPPS, INITIAL VISIT: HCPCS | Performed by: FAMILY MEDICINE

## 2023-03-20 RX ORDER — METRONIDAZOLE 500 MG/1
500 TABLET ORAL 2 TIMES DAILY
Qty: 14 TABLET | Refills: 0 | Status: SHIPPED | OUTPATIENT
Start: 2023-03-20 | End: 2023-03-27

## 2023-03-20 RX ORDER — METOPROLOL SUCCINATE 25 MG/1
25 TABLET, EXTENDED RELEASE ORAL DAILY
Qty: 90 TABLET | Refills: 1 | Status: SHIPPED | OUTPATIENT
Start: 2023-03-20

## 2023-03-20 SDOH — ECONOMIC STABILITY: FOOD INSECURITY: WITHIN THE PAST 12 MONTHS, THE FOOD YOU BOUGHT JUST DIDN'T LAST AND YOU DIDN'T HAVE MONEY TO GET MORE.: NEVER TRUE

## 2023-03-20 SDOH — ECONOMIC STABILITY: HOUSING INSECURITY
IN THE LAST 12 MONTHS, WAS THERE A TIME WHEN YOU DID NOT HAVE A STEADY PLACE TO SLEEP OR SLEPT IN A SHELTER (INCLUDING NOW)?: NO

## 2023-03-20 SDOH — ECONOMIC STABILITY: FOOD INSECURITY: WITHIN THE PAST 12 MONTHS, YOU WORRIED THAT YOUR FOOD WOULD RUN OUT BEFORE YOU GOT MONEY TO BUY MORE.: NEVER TRUE

## 2023-03-20 SDOH — ECONOMIC STABILITY: INCOME INSECURITY: HOW HARD IS IT FOR YOU TO PAY FOR THE VERY BASICS LIKE FOOD, HOUSING, MEDICAL CARE, AND HEATING?: NOT HARD AT ALL

## 2023-03-20 ASSESSMENT — PATIENT HEALTH QUESTIONNAIRE - PHQ9
SUM OF ALL RESPONSES TO PHQ9 QUESTIONS 1 & 2: 0
2. FEELING DOWN, DEPRESSED OR HOPELESS: 0
SUM OF ALL RESPONSES TO PHQ QUESTIONS 1-9: 0
SUM OF ALL RESPONSES TO PHQ QUESTIONS 1-9: 0
1. LITTLE INTEREST OR PLEASURE IN DOING THINGS: 0
SUM OF ALL RESPONSES TO PHQ QUESTIONS 1-9: 0
SUM OF ALL RESPONSES TO PHQ QUESTIONS 1-9: 0

## 2023-03-20 ASSESSMENT — LIFESTYLE VARIABLES
HOW OFTEN DO YOU HAVE A DRINK CONTAINING ALCOHOL: MONTHLY OR LESS
HOW MANY STANDARD DRINKS CONTAINING ALCOHOL DO YOU HAVE ON A TYPICAL DAY: 3 OR 4

## 2023-03-20 ASSESSMENT — VISUAL ACUITY
OS_CC: 20/50
OD_CC: 20/25

## 2023-03-20 NOTE — PATIENT INSTRUCTIONS
independent as possible. Your doctor will want to know if you are able to do tasks such as: Take a bath or shower without help. Go to the bathroom by yourself. Dress and undress without help. Shave, comb your hair, and brush teeth on your own. Get in and out of bed or a chair without help. Feed yourself without help. If you are having trouble doing basic self-care tasks, talk with your doctor. You may want to bring a caregiver or family member who can help the doctor understand your needs and abilities. How will a doctor assess your ADLs? Asking about ADLs is part of a routine health checkup your doctor will likely do as you age. Your health check might be done in a doctor's office, in your home, or at a hospital. The goal is to find out if you are having any problems that could make your health problems worse or that make it unsafe for you to be on your own. To measure your ADLs, your doctor will ask how hard it is for you to do routine tasks. He or she may also want to know if you have changed the way you do a task because of a health problem. He or she may watch how you:  Walk back and forth. Keep your balance while you stand or walk. Move from sitting to standing or from a bed to a chair. Button or unbutton a shirt or sweater. Remove and put on your shoes. It's normal to feel a little worried or anxious if you find you can't do all the things you used to be able to do. Talking with your doctor about ADLs isn't a test that you either pass or fail. It's just a way to get more information about your health and safety. Follow-up care is a key part of your treatment and safety. Be sure to make and go to all appointments, and call your doctor if you are having problems. It's also a good idea to know your test results and keep a list of the medicines you take. Current as of: June 6, 2022               Content Version: 13.6  © 1337-9714 [employer].    Care instructions adapted under

## 2023-03-20 NOTE — PROGRESS NOTES
MG tablet TAKE 1 TABLET BY MOUTH EVERY MORNING (BEFORE BREAKFAST)  Patient taking differently: Take 40 mg by mouth every morning (before breakfast) Takes at [place], APRN - CNP   metoprolol succinate (TOPROL XL) 25 MG extended release tablet Take 1 tablet by mouth daily Take 1 tablet daily  Patient taking differently: Take 25 mg by mouth daily Take at 4PM Yes Guanakito Gonzalez MD   ibuprofen (ADVIL;MOTRIN) 600 MG tablet TAKE 1 TABLET BY MOUTH EVERY 6 HOURS AS NEEDED FOR PAIN  Patient taking differently: Take 600 mg by mouth every 6 hours as needed for Pain Hold pre-op Yes Donaldo Ruiz DO   PROAIR  (34 Base) MCG/ACT inhaler Inhale 2 puffs into the lungs every 6 hours as needed for Wheezing (or Cough spells)  Patient taking differently: Inhale 2 puffs into the lungs every 6 hours as needed for Wheezing (or Cough spells) 2/23/23 - last taken Yes Donaldo Ruiz DO   Fluticasone-Umeclidin-Vilant (Denny Chute) 676-37.3-65 MCG/INH AEPB   Historical Provider, MD Sears (Including outside providers/suppliers regularly involved in providing care):   Patient Care Team:  Donaldo Ruiz DO as PCP - General (Family Medicine)  Donaldo Ruiz DO as PCP - Empaneled Provider  Maryuri Grant MD as Consulting Physician (Cardiology)  Harmony Dempsey DO as Consulting Physician (Electrophysiology)  Guanakito Gonzalez MD as Consulting Physician (Electrophysiology)     Reviewed and updated this visit:  Tobacco  Allergies  Meds  Med Hx  Surg Hx  Soc Hx  Fam Hx             Donaldo Ruiz DO

## 2023-03-29 DIAGNOSIS — Z12.11 COLON CANCER SCREENING: Primary | ICD-10-CM

## 2023-04-04 ENCOUNTER — OFFICE VISIT (OUTPATIENT)
Dept: PAIN MANAGEMENT | Age: 58
End: 2023-04-04
Payer: MEDICARE

## 2023-04-04 ENCOUNTER — PREP FOR PROCEDURE (OUTPATIENT)
Dept: PAIN MANAGEMENT | Age: 58
End: 2023-04-04

## 2023-04-04 VITALS
HEART RATE: 76 BPM | DIASTOLIC BLOOD PRESSURE: 64 MMHG | WEIGHT: 203 LBS | SYSTOLIC BLOOD PRESSURE: 108 MMHG | OXYGEN SATURATION: 96 % | TEMPERATURE: 97.1 F | HEIGHT: 68 IN | BODY MASS INDEX: 30.77 KG/M2

## 2023-04-04 DIAGNOSIS — M51.36 DDD (DEGENERATIVE DISC DISEASE), LUMBAR: ICD-10-CM

## 2023-04-04 DIAGNOSIS — M47.817 LUMBOSACRAL SPONDYLOSIS WITHOUT MYELOPATHY: Primary | ICD-10-CM

## 2023-04-04 DIAGNOSIS — M96.1 POSTLAMINECTOMY SYNDROME, LUMBAR: ICD-10-CM

## 2023-04-04 PROCEDURE — 99213 OFFICE O/P EST LOW 20 MIN: CPT | Performed by: ANESTHESIOLOGY

## 2023-04-04 PROCEDURE — 3017F COLORECTAL CA SCREEN DOC REV: CPT | Performed by: ANESTHESIOLOGY

## 2023-04-04 PROCEDURE — G8417 CALC BMI ABV UP PARAM F/U: HCPCS | Performed by: ANESTHESIOLOGY

## 2023-04-04 PROCEDURE — G8427 DOCREV CUR MEDS BY ELIG CLIN: HCPCS | Performed by: ANESTHESIOLOGY

## 2023-04-04 PROCEDURE — 4004F PT TOBACCO SCREEN RCVD TLK: CPT | Performed by: ANESTHESIOLOGY

## 2023-04-04 RX ORDER — SODIUM CHLORIDE 0.9 % (FLUSH) 0.9 %
5-40 SYRINGE (ML) INJECTION PRN
Status: CANCELLED | OUTPATIENT
Start: 2023-04-04

## 2023-04-04 RX ORDER — SODIUM CHLORIDE 9 MG/ML
INJECTION, SOLUTION INTRAVENOUS PRN
Status: CANCELLED | OUTPATIENT
Start: 2023-04-04

## 2023-04-04 RX ORDER — SODIUM CHLORIDE 0.9 % (FLUSH) 0.9 %
5-40 SYRINGE (ML) INJECTION EVERY 12 HOURS SCHEDULED
Status: CANCELLED | OUTPATIENT
Start: 2023-04-04

## 2023-04-04 NOTE — PROGRESS NOTES
Aime Cage presents to the [facility] on 4/4/2023. Elijah Gutierrez is complaining of pain in back. The pain is constant. The pain is described as aching. Pain is rated on her best day at a 5, on her worst day at a 10, and on average at a 8 on the VAS scale. She took her last dose of Neurontin last night. Elijah Gutierrez does not have issues with constipation. Any procedures since your last visit: Yes, with 0 % relief. She is  on NSAIDS and  is not on anticoagulation medications to include none and is managed by none. Pacemaker or defibrillator: No.    Medication Contract and Consent for Opioid Use Documents Filed        No documents found                       Ht 5' 8\" (1.727 m)   Wt 203 lb (92.1 kg)   LMP  (LMP Unknown)   BMI 30.87 kg/m²      No LMP recorded (lmp unknown).  Patient is postmenopausal.
High Blood Pressure Father        REVIEW OF SYSTEMS:     Gilmer Bustamante denies fever/chills, chest pain, shortness of breath, new bowel or bladder complaints. All other review of systems was negative. PHYSICAL EXAMINATION:      /64   Pulse 76   Temp 97.1 °F (36.2 °C) (Infrared)   Ht 5' 8\" (1.727 m)   Wt 203 lb (92.1 kg)   LMP  (LMP Unknown)   SpO2 96%   BMI 30.87 kg/m²   General:       General appearance:  Pleasant and well-hydrated, in no distress and A & O x 3  Build:Overweight  Function: Rises from seated position easily and Moves about room without difficulty     HEENT:     Head:normocephalic, atraumatic     Lungs:     Breathing:normal breathing pattern      CVS:     RRR     Abdomen:     Shape:non-distended and normal     Cervical spine:     Inspection:normal     Thoracic spine:                Spine inspection:normal   Palpation:No tenderness over the midline and paraspinal area, bilaterally  Range of motion:normal in flexion, extension rotation bilateral and is not painful. Lumbar spine:     Spine inspection: Scar from the prior surgery noted healed well  Palpation: Tenderness paravertebral muscles Yes   Range of motion: Decreased, flexion Decreased, Lateral bending, extension and rotation bilaterally reduced is somewhat painful. Left lumbar facet tenderness + above the fusion. Sacroiliac joint tenderness Yes left  SLR : negative bilaterally      Musculoskeletal:     Trigger points no     Extremities:     Tremors:None bilaterally upper and lower  Edema:no     Neurological:     Sensory: Normal to light touch      Motor:                   Right Quadriceps 5/5          Left Quadriceps 5/5           Right Gastrocnemius 5/5    Left Gastrocnemius 5/5  Right Ant Tibialis 5/5  Left Ant Tibialis 5/5     Dermatology:     Skin:no rashes or lesions noted    Assessment/Plan:   Diagnosis Orders   1. Lumbosacral spondylosis without myelopathy        2. DDD (degenerative disc disease), lumbar        3.

## 2023-04-21 ENCOUNTER — TELEPHONE (OUTPATIENT)
Dept: PAIN MANAGEMENT | Age: 58
End: 2023-04-21

## 2023-04-21 NOTE — TELEPHONE ENCOUNTER
Call to Rakan Licona that procedure was approved for 4/25/2023 and that [facility] should call her a few days before for the pre op call and after 3:00 PM the business day before with the arrival time. Instructed Niecy to hold ibuprofen for 24 hours, naprosyn for 4 days and any aspirin containing products or fish oil for 7 days. Instructed to call office back if any questions. Vivian Patricia verbalized understanding.     Electronically signed by Anabelle Rodríguez RN on 4/21/2023 at 9:19 AM

## 2023-04-23 ENCOUNTER — ANESTHESIA EVENT (OUTPATIENT)
Dept: OPERATING ROOM | Age: 58
End: 2023-04-23
Payer: MEDICARE

## 2023-04-23 ASSESSMENT — LIFESTYLE VARIABLES: SMOKING_STATUS: 1

## 2023-04-23 ASSESSMENT — ENCOUNTER SYMPTOMS: SHORTNESS OF BREATH: 1

## 2023-04-24 NOTE — ANESTHESIA PRE PROCEDURE
Department of Anesthesiology  Preprocedure Note       Name:  Michelle Sharma   Age:  62 y.o.  :  1965                                          MRN:  [id number]         Date:  2023      Surgeon: Edgard Patel):  Edwin Gonzalez MD    Procedure: Left lumbar radiofrequency ablation under fluoroscopic guidance at L1, L2, L3, and L4 under fluoroscopy SEDATION (Left)    Medications prior to admission:   Prior to Admission medications    Medication Sig Start Date End Date Taking? Authorizing Provider   metoprolol succinate (TOPROL XL) 25 MG extended release tablet Take 1 tablet by mouth daily Take at [facility] 3/20/23   Geraldine Nageotte, APRN - CNP   atorvastatin (LIPITOR) 20 MG tablet Take 1 tablet by mouth every evening Takes at [facility] 23   PENNIE Guerrero CNP   vitamin D (CHOLECALCIFEROL) 50 MCG (2000) TABS tablet Take 1 tablet by mouth daily 23   PENNIE Guerrero CNP   fluticasone [facility]) 50 MCG/ACT nasal spray 2 sprays by Each Nostril route daily 23   Denae Poole PA-C   gabapentin (NEURONTIN) 300 MG capsule nightly.  10/28/22   Historical Provider, MD   SYMBICORT 160-4.5 MCG/ACT AERO daily am 22   Historical Provider, MD   pantoprazole (PROTONIX) 40 MG tablet TAKE 1 TABLET BY MOUTH EVERY MORNING (BEFORE BREAKFAST)  Patient taking differently: Take 1 tablet by mouth every morning (before breakfast) Takes at [facility] 22   PENNIE Guerrero CNP   ibuprofen (ADVIL;MOTRIN) 600 MG tablet TAKE 1 TABLET BY MOUTH EVERY 6 HOURS AS NEEDED FOR PAIN 22  Maria Guadalupe Singh DO   PROAIR  (58 Base) MCG/ACT inhaler Inhale 2 puffs into the lungs every 6 hours as needed for Wheezing (or Cough spells)  Patient taking differently: Inhale 2 puffs into the lungs every 6 hours as needed for Wheezing (or Cough spells) 23 - last taken 22   Maria Guadalupe Singh DO       Current medications:    Current Outpatient Medications   Medication Sig Dispense Refill   

## 2023-04-25 ENCOUNTER — ANESTHESIA (OUTPATIENT)
Dept: OPERATING ROOM | Age: 58
End: 2023-04-25
Payer: MEDICARE

## 2023-04-25 ENCOUNTER — HOSPITAL ENCOUNTER (OUTPATIENT)
Age: 58
Setting detail: OUTPATIENT SURGERY
Discharge: HOME | End: 2023-04-25
Attending: ANESTHESIOLOGY | Admitting: ANESTHESIOLOGY
Payer: MEDICARE

## 2023-04-25 ENCOUNTER — HOSPITAL ENCOUNTER (OUTPATIENT)
Dept: OPERATING ROOM | Age: 58
Setting detail: OUTPATIENT SURGERY
Discharge: HOME | End: 2023-04-25
Attending: ANESTHESIOLOGY
Payer: MEDICARE

## 2023-04-25 VITALS
HEIGHT: 68 IN | HEART RATE: 86 BPM | SYSTOLIC BLOOD PRESSURE: 141 MMHG | BODY MASS INDEX: 30.62 KG/M2 | TEMPERATURE: 98 F | OXYGEN SATURATION: 95 % | WEIGHT: 202 LBS | RESPIRATION RATE: 16 BRPM | DIASTOLIC BLOOD PRESSURE: 69 MMHG

## 2023-04-25 DIAGNOSIS — M47.896 OTHER OSTEOARTHRITIS OF SPINE, LUMBAR REGION: ICD-10-CM

## 2023-04-25 PROCEDURE — 2709999900 HC NON-CHARGEABLE SUPPLY: Performed by: ANESTHESIOLOGY

## 2023-04-25 PROCEDURE — 6360000002 HC RX W HCPCS: Performed by: ANESTHESIOLOGY

## 2023-04-25 PROCEDURE — 7100000010 HC PHASE II RECOVERY - FIRST 15 MIN: Performed by: ANESTHESIOLOGY

## 2023-04-25 PROCEDURE — 2580000003 HC RX 258: Performed by: ANESTHESIOLOGY

## 2023-04-25 PROCEDURE — 3600000005 HC SURGERY LEVEL 5 BASE: Performed by: ANESTHESIOLOGY

## 2023-04-25 PROCEDURE — 3209999900 FLUORO FOR SURGICAL PROCEDURES

## 2023-04-25 PROCEDURE — 2500000003 HC RX 250 WO HCPCS: Performed by: ANESTHESIOLOGY

## 2023-04-25 PROCEDURE — 3600000015 HC SURGERY LEVEL 5 ADDTL 15MIN: Performed by: ANESTHESIOLOGY

## 2023-04-25 PROCEDURE — 3700000000 HC ANESTHESIA ATTENDED CARE: Performed by: ANESTHESIOLOGY

## 2023-04-25 PROCEDURE — 3700000001 HC ADD 15 MINUTES (ANESTHESIA): Performed by: ANESTHESIOLOGY

## 2023-04-25 PROCEDURE — 6360000002 HC RX W HCPCS: Performed by: NURSE ANESTHETIST, CERTIFIED REGISTERED

## 2023-04-25 PROCEDURE — 7100000011 HC PHASE II RECOVERY - ADDTL 15 MIN: Performed by: ANESTHESIOLOGY

## 2023-04-25 RX ORDER — SODIUM CHLORIDE, SODIUM LACTATE, POTASSIUM CHLORIDE, CALCIUM CHLORIDE 600; 310; 30; 20 MG/100ML; MG/100ML; MG/100ML; MG/100ML
INJECTION, SOLUTION INTRAVENOUS CONTINUOUS
Status: DISCONTINUED | OUTPATIENT
Start: 2023-04-25 | End: 2023-04-25 | Stop reason: HOSPADM

## 2023-04-25 RX ORDER — SODIUM CHLORIDE 0.9 % (FLUSH) 0.9 %
5-40 SYRINGE (ML) INJECTION PRN
Status: DISCONTINUED | OUTPATIENT
Start: 2023-04-25 | End: 2023-04-25 | Stop reason: HOSPADM

## 2023-04-25 RX ORDER — FENTANYL CITRATE 50 UG/ML
INJECTION, SOLUTION INTRAMUSCULAR; INTRAVENOUS PRN
Status: DISCONTINUED | OUTPATIENT
Start: 2023-04-25 | End: 2023-04-25 | Stop reason: SDUPTHER

## 2023-04-25 RX ORDER — BUPIVACAINE HYDROCHLORIDE 2.5 MG/ML
INJECTION, SOLUTION EPIDURAL; INFILTRATION; INTRACAUDAL PRN
Status: DISCONTINUED | OUTPATIENT
Start: 2023-04-25 | End: 2023-04-25 | Stop reason: ALTCHOICE

## 2023-04-25 RX ORDER — SODIUM CHLORIDE 0.9 % (FLUSH) 0.9 %
5-40 SYRINGE (ML) INJECTION EVERY 12 HOURS SCHEDULED
Status: DISCONTINUED | OUTPATIENT
Start: 2023-04-25 | End: 2023-04-25 | Stop reason: HOSPADM

## 2023-04-25 RX ORDER — SODIUM CHLORIDE 9 MG/ML
INJECTION, SOLUTION INTRAVENOUS PRN
Status: DISCONTINUED | OUTPATIENT
Start: 2023-04-25 | End: 2023-04-25 | Stop reason: HOSPADM

## 2023-04-25 RX ORDER — LIDOCAINE HYDROCHLORIDE 5 MG/ML
INJECTION, SOLUTION INFILTRATION; INTRAVENOUS PRN
Status: DISCONTINUED | OUTPATIENT
Start: 2023-04-25 | End: 2023-04-25 | Stop reason: ALTCHOICE

## 2023-04-25 RX ORDER — ONDANSETRON 2 MG/ML
INJECTION INTRAMUSCULAR; INTRAVENOUS PRN
Status: DISCONTINUED | OUTPATIENT
Start: 2023-04-25 | End: 2023-04-25 | Stop reason: SDUPTHER

## 2023-04-25 RX ORDER — DEXAMETHASONE SODIUM PHOSPHATE 10 MG/ML
INJECTION, SOLUTION INTRAMUSCULAR; INTRAVENOUS PRN
Status: DISCONTINUED | OUTPATIENT
Start: 2023-04-25 | End: 2023-04-25 | Stop reason: ALTCHOICE

## 2023-04-25 RX ORDER — LIDOCAINE HYDROCHLORIDE 10 MG/ML
INJECTION, SOLUTION EPIDURAL; INFILTRATION; INTRACAUDAL; PERINEURAL PRN
Status: DISCONTINUED | OUTPATIENT
Start: 2023-04-25 | End: 2023-04-25 | Stop reason: ALTCHOICE

## 2023-04-25 RX ORDER — MIDAZOLAM HYDROCHLORIDE 1 MG/ML
INJECTION INTRAMUSCULAR; INTRAVENOUS PRN
Status: DISCONTINUED | OUTPATIENT
Start: 2023-04-25 | End: 2023-04-25 | Stop reason: SDUPTHER

## 2023-04-25 RX ADMIN — MIDAZOLAM 2 MG: 1 INJECTION INTRAMUSCULAR; INTRAVENOUS at 13:01

## 2023-04-25 RX ADMIN — FENTANYL CITRATE 50 MCG: 50 INJECTION INTRAMUSCULAR; INTRAVENOUS at 12:57

## 2023-04-25 RX ADMIN — FENTANYL CITRATE 50 MCG: 50 INJECTION INTRAMUSCULAR; INTRAVENOUS at 13:02

## 2023-04-25 RX ADMIN — SODIUM CHLORIDE, POTASSIUM CHLORIDE, SODIUM LACTATE AND CALCIUM CHLORIDE: 600; 310; 30; 20 INJECTION, SOLUTION INTRAVENOUS at 12:01

## 2023-04-25 RX ADMIN — MIDAZOLAM 2 MG: 1 INJECTION INTRAMUSCULAR; INTRAVENOUS at 12:55

## 2023-04-25 RX ADMIN — ONDANSETRON 4 MG: 2 INJECTION INTRAMUSCULAR; INTRAVENOUS at 12:56

## 2023-04-25 RX ADMIN — FENTANYL CITRATE 50 MCG: 50 INJECTION INTRAMUSCULAR; INTRAVENOUS at 12:56

## 2023-04-25 RX ADMIN — FENTANYL CITRATE 50 MCG: 50 INJECTION INTRAMUSCULAR; INTRAVENOUS at 13:03

## 2023-04-25 ASSESSMENT — PAIN DESCRIPTION - LOCATION: LOCATION: BACK

## 2023-04-25 ASSESSMENT — PAIN - FUNCTIONAL ASSESSMENT: PAIN_FUNCTIONAL_ASSESSMENT: 0-10

## 2023-04-25 ASSESSMENT — COPD QUESTIONNAIRES: CAT_SEVERITY: MODERATE

## 2023-04-25 ASSESSMENT — PAIN SCALES - GENERAL
PAINLEVEL_OUTOF10: 8
PAINLEVEL_OUTOF10: 8

## 2023-04-25 NOTE — PROGRESS NOTES
Patient had procedure this afternoon and discharged home. She is calling the surgery center and complaining of severe pain. Dr. Pj Martell notified. Instructed patient to [facility] area per Dr. Pj Martell instruction. He will call patient.

## 2023-04-25 NOTE — ANESTHESIA POSTPROCEDURE EVALUATION
Department of Anesthesiology  Postprocedure Note    Patient: Lindsey Ashford  MRN: [id number]  YOB: 1965  Date of evaluation: 4/25/2023      Procedure Summary     Date: 04/25/23 Room / Location: [address] [place] [address] / [address] [place] [address]    Anesthesia Start: 4519 Anesthesia Stop: 1143    Procedure: Left lumbar radiofrequency ablation under fluoroscopic guidance at L1, L2, L3, and L4 under fluoroscopy SEDATION (Left: Back) Diagnosis:       Lumbar spondylosis      (Lumbar spondylosis [D62.674])    Surgeons: Sheba Babb MD Responsible Provider: Manisha Panchal MD    Anesthesia Type: MAC ASA Status: 3          Anesthesia Type: MAC    Steven Phase I: Steven Score: 10    Steven Phase II: Steven Score: 10      Anesthesia Post Evaluation    Patient location during evaluation: bedside  Patient participation: complete - patient participated  Level of consciousness: awake  Pain score: 0  Airway patency: patent  Nausea & Vomiting: no nausea and no vomiting  Complications: no  Cardiovascular status: hemodynamically stable  Respiratory status: acceptable  Hydration status: euvolemic

## 2023-04-25 NOTE — INTERVAL H&P NOTE
Update History & Physical    The patient's History and Physical of April 4, 2023 was reviewed with the patient and I examined the patient. There was no change. The surgical site was confirmed by the patient and me. Plan: The risks, benefits, expected outcome, and alternative to the recommended procedure have been discussed with the patient. Patient understands and wants to proceed with the procedure.      Electronically signed by Puja Johnston MD on 4/25/2023

## 2023-04-25 NOTE — OP NOTE
Operative Note      Patient: Iona River  YOB: 1965  MRN: [id number]    Date of Procedure: 2023    Pre-Op Diagnosis Codes:     * Lumbar spondylosis [M47.816]    Post-Op Diagnosis: Same       Procedure(s):  Left lumbar radiofrequency ablation under fluoroscopic guidance at L1, L2, L3, and L4 under fluoroscopy SEDATION    Surgeon(s):  Bella Guzman MD    Assistant:   * No surgical staff found *    Anesthesia: Monitor Anesthesia Care    Estimated Blood Loss (mL): Minimal    Complications: None    Specimens:   * No specimens in log *    Implants:  * No implants in log *      Drains: * No LDAs found *    Findings: good needle placement      Detailed Description of Procedure:   2023    Patient: Alisia Patricia  :  1965  Age:  62 y.o. Sex:  female     PRE-OPERATIVE DIAGNOSIS:  Lumbar spondylosis, lumbar facet arthropathy. POST-OPERATIVE DIAGNOSIS: Same. PROCEDURE:  Fluoroscopic-guided Left  Lumbar facet medial branch radiofrequency ablation at levels L1, l2, L3 L4 MB    SURGEON:  Bella Guzman MD    ANESTHESIA: MAC    ESTIMATED BLOOD LOSS: None.  ______________________________________________________________________  HISTORY & PHYSICAL: Iona River presents today for fluoroscopic-guided Left lumbar facet medial branch radiofrequency ablation. The potential complications of the procedure were explained to Ranjan again today and she has elected to undergo the aforementioned procedure. Rebeka complete History & Physical examination were reviewed in depth, a copy of which is in the chart. DESCRIPTION OF PROCEDURE:    After confirming written and informed consent, a time-out was performed and Rebeka name and date of birth, the procedure to be performed as well as the plan for the location of the needle insertion were confirmed. The patient was brought into the procedure room and placed in the prone position on the fluoroscopy table.  Standard monitors were placed and

## 2023-04-25 NOTE — DISCHARGE INSTRUCTIONS
[facility]) Pain Management Department  [phone]   Post-Pain Block/ Radiofrequency Home Going Instructions    1-Go home, rest for the remainder of the day  2-Please do not lift over 20 pounds the day of the injection  3-If you received sedation No: alcohol, driving, operating lawn mowers, plows, tractors or other dangerous equipment until next morning. Do not make important decisions or sign legal documents for 24 hours. You may experience light headedness, dizziness, nausea or sleepiness after sedation. Do not stay alone. A responsible adult must be with you for 24 hours. You could be nauseated from the medications you have received. Your IV site may be sore and bruised. 4-No dietary restrictions     5-Resume all medications the same day, blood thinners to be resumed 24 hours after injection    6-Keep the surgical site clean and dry, you may shower the next morning and remove the      dressing. 7- No sitz baths, tub baths or hot tubs/swimming for 24 hours. 8- If you have any pain at the injection site(s), application of an ice pack to the area should be       helpful, 20 minutes on/20 minutes off for next 48 hours. 9- [facility] if you develop.   Fever greater than 100.4 F  Have bleeding or drainage from the puncture site  Have progressive Leg/arm numbness and or weakness  Loss of control of bowel and or bladder (wet/soil yourself)  Severe headache with inability to lift head  10-You may return to work the next day

## 2023-04-26 LAB — NONINV COLON CA DNA+OCC BLD SCRN STL QL: NEGATIVE

## 2023-04-27 ENCOUNTER — HOSPITAL ENCOUNTER (OUTPATIENT)
Dept: MAMMOGRAPHY | Age: 58
Discharge: HOME | End: 2023-04-29

## 2023-04-27 DIAGNOSIS — Z12.39 ENCOUNTER FOR SCREENING FOR MALIGNANT NEOPLASM OF BREAST, UNSPECIFIED SCREENING MODALITY: ICD-10-CM

## 2023-04-27 DIAGNOSIS — Z12.31 SCREENING MAMMOGRAM FOR BREAST CANCER: ICD-10-CM

## 2023-04-28 DIAGNOSIS — K57.32 DIVERTICULITIS OF LARGE INTESTINE WITHOUT PERFORATION OR ABSCESS WITHOUT BLEEDING: ICD-10-CM

## 2023-04-28 RX ORDER — METRONIDAZOLE 500 MG/1
500 TABLET ORAL 2 TIMES DAILY
Qty: 14 TABLET | Refills: 0 | OUTPATIENT
Start: 2023-04-28 | End: 2023-05-05

## 2023-04-30 ENCOUNTER — APPOINTMENT (OUTPATIENT)
Dept: CT IMAGING | Age: 58
End: 2023-04-30
Payer: MEDICARE

## 2023-04-30 ENCOUNTER — HOSPITAL ENCOUNTER (EMERGENCY)
Age: 58
Discharge: HOME | End: 2023-05-01
Attending: EMERGENCY MEDICINE
Payer: MEDICARE

## 2023-04-30 VITALS
RESPIRATION RATE: 20 BRPM | BODY MASS INDEX: 30.41 KG/M2 | DIASTOLIC BLOOD PRESSURE: 63 MMHG | HEART RATE: 103 BPM | WEIGHT: 200 LBS | TEMPERATURE: 98.5 F | SYSTOLIC BLOOD PRESSURE: 132 MMHG | OXYGEN SATURATION: 98 %

## 2023-04-30 DIAGNOSIS — R10.9 RIGHT FLANK PAIN: Primary | ICD-10-CM

## 2023-04-30 LAB
ALBUMIN SERPL-MCNC: 4.3 G/DL (ref 3.5–5.2)
ALP SERPL-CCNC: 113 U/L (ref 35–104)
ALT SERPL-CCNC: 23 U/L (ref 0–32)
ANION GAP SERPL CALCULATED.3IONS-SCNC: 10 MMOL/L (ref 7–16)
AST SERPL-CCNC: 16 U/L (ref 0–31)
BACTERIA URNS QL MICRO: ABNORMAL /HPF
BASOPHILS # BLD: 0.05 E9/L (ref 0–0.2)
BASOPHILS NFR BLD: 0.5 % (ref 0–2)
BILIRUB SERPL-MCNC: 0.5 MG/DL (ref 0–1.2)
BILIRUB UR QL STRIP: NEGATIVE
BUN SERPL-MCNC: 8 MG/DL (ref 6–20)
CALCIUM SERPL-MCNC: 9.5 MG/DL (ref 8.6–10.2)
CHLORIDE SERPL-SCNC: 105 MMOL/L (ref 98–107)
CLARITY UR: CLEAR
CO2 SERPL-SCNC: 23 MMOL/L (ref 22–29)
COLOR UR: YELLOW
CREAT SERPL-MCNC: 0.6 MG/DL (ref 0.5–1)
EOSINOPHIL # BLD: 0.1 E9/L (ref 0.05–0.5)
EOSINOPHIL NFR BLD: 1.1 % (ref 0–6)
ERYTHROCYTE [DISTWIDTH] IN BLOOD BY AUTOMATED COUNT: 12.4 FL (ref 11.5–15)
GLUCOSE SERPL-MCNC: 93 MG/DL (ref 74–99)
GLUCOSE UR STRIP-MCNC: NEGATIVE MG/DL
HCT VFR BLD AUTO: 47 % (ref 34–48)
HGB BLD-MCNC: 15.5 G/DL (ref 11.5–15.5)
HGB UR QL STRIP: ABNORMAL
IMM GRANULOCYTES # BLD: 0.03 E9/L
IMM GRANULOCYTES NFR BLD: 0.3 % (ref 0–5)
KETONES UR STRIP-MCNC: NEGATIVE MG/DL
LACTATE BLDV-SCNC: 1 MMOL/L (ref 0.5–2.2)
LEUKOCYTE ESTERASE UR QL STRIP: NEGATIVE
LIPASE: 26 U/L (ref 13–60)
LYMPHOCYTES # BLD: 2.36 E9/L (ref 1.5–4)
LYMPHOCYTES NFR BLD: 25.6 % (ref 20–42)
MCH RBC QN AUTO: 31.6 PG (ref 26–35)
MCHC RBC AUTO-ENTMCNC: 33 % (ref 32–34.5)
MCV RBC AUTO: 95.7 FL (ref 80–99.9)
MONOCYTES # BLD: 0.57 E9/L (ref 0.1–0.95)
MONOCYTES NFR BLD: 6.2 % (ref 2–12)
NEUTROPHILS # BLD: 6.11 E9/L (ref 1.8–7.3)
NEUTS SEG NFR BLD: 66.3 % (ref 43–80)
NITRITE UR QL STRIP: NEGATIVE
PH UR STRIP: 5.5 [PH] (ref 5–9)
PLATELET # BLD AUTO: 211 E9/L (ref 130–450)
PMV BLD AUTO: 11.5 FL (ref 7–12)
POTASSIUM SERPL-SCNC: 4.1 MMOL/L (ref 3.5–5)
PROT SERPL-MCNC: 7 G/DL (ref 6.4–8.3)
PROT UR STRIP-MCNC: NEGATIVE MG/DL
RBC # BLD AUTO: 4.91 E12/L (ref 3.5–5.5)
RBC #/AREA URNS HPF: ABNORMAL /HPF (ref 0–2)
SODIUM SERPL-SCNC: 138 MMOL/L (ref 132–146)
SP GR UR STRIP: 1.02 (ref 1–1.03)
UROBILINOGEN UR STRIP-ACNC: 0.2 E.U./DL
WBC # BLD: 9.2 E9/L (ref 4.5–11.5)
WBC #/AREA URNS HPF: ABNORMAL /HPF (ref 0–5)

## 2023-04-30 PROCEDURE — 99285 EMERGENCY DEPT VISIT HI MDM: CPT

## 2023-04-30 PROCEDURE — 83605 ASSAY OF LACTIC ACID: CPT

## 2023-04-30 PROCEDURE — 85025 COMPLETE CBC W/AUTO DIFF WBC: CPT

## 2023-04-30 PROCEDURE — 6360000002 HC RX W HCPCS

## 2023-04-30 PROCEDURE — 2580000003 HC RX 258

## 2023-04-30 PROCEDURE — 96374 THER/PROPH/DIAG INJ IV PUSH: CPT

## 2023-04-30 PROCEDURE — 36415 COLL VENOUS BLD VENIPUNCTURE: CPT

## 2023-04-30 PROCEDURE — 83690 ASSAY OF LIPASE: CPT

## 2023-04-30 PROCEDURE — 6360000004 HC RX CONTRAST MEDICATION: Performed by: RADIOLOGY

## 2023-04-30 PROCEDURE — 81001 URINALYSIS AUTO W/SCOPE: CPT

## 2023-04-30 PROCEDURE — 74177 CT ABD & PELVIS W/CONTRAST: CPT

## 2023-04-30 PROCEDURE — 80053 COMPREHEN METABOLIC PANEL: CPT

## 2023-04-30 RX ORDER — KETOROLAC TROMETHAMINE 30 MG/ML
30 INJECTION, SOLUTION INTRAMUSCULAR; INTRAVENOUS ONCE
Status: COMPLETED | OUTPATIENT
Start: 2023-04-30 | End: 2023-04-30

## 2023-04-30 RX ORDER — 0.9 % SODIUM CHLORIDE 0.9 %
1000 INTRAVENOUS SOLUTION INTRAVENOUS ONCE
Status: COMPLETED | OUTPATIENT
Start: 2023-04-30 | End: 2023-05-01

## 2023-04-30 RX ADMIN — IOPAMIDOL 75 ML: 755 INJECTION, SOLUTION INTRAVENOUS at 23:44

## 2023-04-30 RX ADMIN — SODIUM CHLORIDE 1000 ML: 9 INJECTION, SOLUTION INTRAVENOUS at 22:34

## 2023-04-30 RX ADMIN — KETOROLAC TROMETHAMINE 30 MG: 30 INJECTION, SOLUTION INTRAMUSCULAR; INTRAVENOUS at 22:35

## 2023-04-30 ASSESSMENT — PAIN DESCRIPTION - LOCATION: LOCATION: FLANK

## 2023-04-30 ASSESSMENT — PAIN DESCRIPTION - FREQUENCY: FREQUENCY: CONTINUOUS

## 2023-04-30 ASSESSMENT — PAIN DESCRIPTION - ORIENTATION: ORIENTATION: LEFT

## 2023-04-30 ASSESSMENT — PAIN - FUNCTIONAL ASSESSMENT: PAIN_FUNCTIONAL_ASSESSMENT: 0-10

## 2023-04-30 ASSESSMENT — PAIN SCALES - GENERAL: PAINLEVEL_OUTOF10: 8

## 2023-04-30 ASSESSMENT — LIFESTYLE VARIABLES: HOW MANY STANDARD DRINKS CONTAINING ALCOHOL DO YOU HAVE ON A TYPICAL DAY: PATIENT DOES NOT DRINK

## 2023-05-01 DIAGNOSIS — E55.9 VITAMIN D DEFICIENCY: ICD-10-CM

## 2023-05-01 DIAGNOSIS — E78.2 MIXED HYPERLIPIDEMIA: ICD-10-CM

## 2023-05-01 RX ORDER — CHOLECALCIFEROL (VITAMIN D3) 50 MCG
2000 TABLET ORAL DAILY
Qty: 90 TABLET | Refills: 0 | Status: SHIPPED | OUTPATIENT
Start: 2023-05-01

## 2023-05-01 RX ORDER — IBUPROFEN 600 MG/1
600 TABLET ORAL EVERY 6 HOURS PRN
Qty: 20 TABLET | Refills: 0 | Status: SHIPPED | OUTPATIENT
Start: 2023-05-01 | End: 2023-05-06

## 2023-05-01 RX ORDER — ATORVASTATIN CALCIUM 20 MG/1
20 TABLET, FILM COATED ORAL EVERY EVENING
Qty: 90 TABLET | Refills: 0 | Status: SHIPPED | OUTPATIENT
Start: 2023-05-01

## 2023-05-01 NOTE — ED PROVIDER NOTES
63 y/o female with chronic back pain. She recently had a radiofrequency ablation done on Tuesday. She is having right flank pain for the past year. She comes to ED ffor worsening of symptoms and onset of loose stools for the past week. She is concerned for diverticulitis. She states she recently finished Flagyl medication for diverticulitis episode last month. She denies any following: Fever, fecal/urinary incontince, saddle anesthesia, chest pain, shortness of breath, urinary symptoms. Chief Complaint   Patient presents with    Flank Pain     Over 1 year/ hx diverticulitis       Review of Systems   Pert stated in the hpi above   Physical Exam  Vitals reviewed. Constitutional:       General: She is not in acute distress. Appearance: She is not ill-appearing. HENT:      Head: Normocephalic. Right Ear: External ear normal.      Left Ear: External ear normal.      Nose: Nose normal.      Mouth/Throat:      Mouth: Mucous membranes are moist.   Eyes:      General:         Right eye: No discharge. Left eye: No discharge. Conjunctiva/sclera: Conjunctivae normal.   Cardiovascular:      Rate and Rhythm: Normal rate and regular rhythm. Heart sounds: No friction rub. No gallop. Pulmonary:      Effort: No respiratory distress. Breath sounds: No stridor. Abdominal:      General: There is no distension. Tenderness: There is no guarding or rebound. Musculoskeletal:         General: No deformity or signs of injury. Cervical back: Normal range of motion and neck supple. No rigidity or tenderness. Skin:     Coloration: Skin is not jaundiced. Neurological:      Mental Status: She is alert. Mental status is at baseline. Sensory: No sensory deficit. Motor: No weakness.    Psychiatric:         Mood and Affect: Mood normal.         Behavior: Behavior normal.        Procedures     CT ABDOMEN PELVIS W IV CONTRAST Additional Contrast? None   Final Result

## 2023-05-01 NOTE — ED NOTES
Pt's PIV removed with catheter intact. Pt given d/c instructions and was dee to verbalize understanding. Pt ambulatory out of department.       Jenn Skelton RN  05/01/23 [id number]

## 2023-05-01 NOTE — TELEPHONE ENCOUNTER
Requested Prescriptions     Pending Prescriptions Disp Refills    ibuprofen (ADVIL;MOTRIN) 600 MG tablet 20 tablet 0     Sig: Take 1 tablet by mouth every 6 hours as needed for Pain    atorvastatin (LIPITOR) 20 MG tablet 90 tablet 0     Sig: Take 1 tablet by mouth every evening Takes at 4PM    vitamin D (CHOLECALCIFEROL) 50 MCG (2000 UT) TABS tablet 90 tablet 0     Sig: Take 1 tablet by mouth daily       Next appt is Visit date not found  Last appt was 3/20/2023

## 2023-05-09 ENCOUNTER — OFFICE VISIT (OUTPATIENT)
Dept: FAMILY MEDICINE CLINIC | Age: 58
End: 2023-05-09
Payer: MEDICARE

## 2023-05-09 VITALS
HEIGHT: 68 IN | TEMPERATURE: 98.1 F | DIASTOLIC BLOOD PRESSURE: 70 MMHG | BODY MASS INDEX: 30.71 KG/M2 | SYSTOLIC BLOOD PRESSURE: 102 MMHG | WEIGHT: 202.6 LBS | OXYGEN SATURATION: 96 % | RESPIRATION RATE: 16 BRPM | HEART RATE: 94 BPM

## 2023-05-09 DIAGNOSIS — R05.9 COUGH, UNSPECIFIED TYPE: Primary | ICD-10-CM

## 2023-05-09 DIAGNOSIS — J20.9 BRONCHITIS WITH BRONCHOSPASM: ICD-10-CM

## 2023-05-09 DIAGNOSIS — M51.36 DDD (DEGENERATIVE DISC DISEASE), LUMBAR: ICD-10-CM

## 2023-05-09 LAB
INFLUENZA A ANTIBODY: NEGATIVE
INFLUENZA B ANTIBODY: NEGATIVE
Lab: NORMAL
PERFORMING INSTRUMENT: NORMAL
QC PASS/FAIL: NORMAL
SARS-COV-2, POC: NORMAL

## 2023-05-09 PROCEDURE — 4004F PT TOBACCO SCREEN RCVD TLK: CPT | Performed by: FAMILY MEDICINE

## 2023-05-09 PROCEDURE — 87426 SARSCOV CORONAVIRUS AG IA: CPT | Performed by: FAMILY MEDICINE

## 2023-05-09 PROCEDURE — G8427 DOCREV CUR MEDS BY ELIG CLIN: HCPCS | Performed by: FAMILY MEDICINE

## 2023-05-09 PROCEDURE — G8417 CALC BMI ABV UP PARAM F/U: HCPCS | Performed by: FAMILY MEDICINE

## 2023-05-09 PROCEDURE — 99214 OFFICE O/P EST MOD 30 MIN: CPT | Performed by: FAMILY MEDICINE

## 2023-05-09 PROCEDURE — 87804 INFLUENZA ASSAY W/OPTIC: CPT | Performed by: FAMILY MEDICINE

## 2023-05-09 PROCEDURE — 3017F COLORECTAL CA SCREEN DOC REV: CPT | Performed by: FAMILY MEDICINE

## 2023-05-09 RX ORDER — PREDNISONE 5 MG/1
TABLET ORAL
Qty: 21 EACH | Refills: 0 | Status: SHIPPED | OUTPATIENT
Start: 2023-05-09

## 2023-05-09 RX ORDER — AZITHROMYCIN 250 MG/1
TABLET, FILM COATED ORAL
Qty: 6 TABLET | Refills: 0 | Status: SHIPPED | OUTPATIENT
Start: 2023-05-09

## 2023-05-09 ASSESSMENT — ENCOUNTER SYMPTOMS
SHORTNESS OF BREATH: 0
BLOOD IN STOOL: 0
NAUSEA: 0
WHEEZING: 0
VOMITING: 0
CONSTIPATION: 0
DIARRHEA: 0
ABDOMINAL PAIN: 0
COUGH: 0

## 2023-05-09 NOTE — PROGRESS NOTES
Roxana River (:  1965) is a 62 y.o. female,Established patient, here for evaluation of the following chief complaint(s):  Back Pain (Lower left side radiates to front) and Sinus Problem (Coughing, chest congestion)         ASSESSMENT/PLAN:  1. Cough, unspecified type  -     POCT COVID-19, Antigen  -     POCT Influenza A/B  -     predniSONE 5 MG (21) TBPK; Take 6 pills on day 1, 5 pills on day 2,4 pills on day 3, 3 pills on day 4, 2 pills on day 5, 1 pill on day 6., Disp-21 each, R-0Normal  -     azithromycin (ZITHROMAX Z-CRISTÓBAL) 250 MG tablet; 2 po on day #1, then 1 po qd for 4 days, Disp-6 tablet, R-0Normal  2. Bronchitis with bronchospasm  -     predniSONE 5 MG (21) TBPK; Take 6 pills on day 1, 5 pills on day 2,4 pills on day 3, 3 pills on day 4, 2 pills on day 5, 1 pill on day 6., Disp-21 each, R-0Normal  -     azithromycin (ZITHROMAX Z-CRISTÓBAL) 250 MG tablet; 2 po on day #1, then 1 po qd for 4 days, Disp-6 tablet, R-0Normal  3. DDD (degenerative disc disease), lumbar  -     XR LUMBAR SPINE (2-3 VIEWS); Future      No follow-ups on file. Subjective   SUBJECTIVE/OBJECTIVE:  Back Pain (Lower left side radiates to front) and Sinus Problem (Coughing, chest congestion)        Review of Systems   Constitutional:  Negative for chills, diaphoresis and fever. HENT:  Negative for ear discharge, ear pain, hearing loss, nosebleeds and tinnitus. Respiratory:  Negative for cough, shortness of breath and wheezing. Cardiovascular:  Negative for chest pain. Gastrointestinal:  Negative for abdominal pain, blood in stool, constipation, diarrhea, nausea and vomiting. Genitourinary:  Negative for dysuria, flank pain and hematuria. Musculoskeletal:  Negative for myalgias. Skin:  Negative for rash. Neurological:  Negative for headaches. Hematological:  Does not bruise/bleed easily. Psychiatric/Behavioral:  Negative for hallucinations and suicidal ideas.          Objective   /70   Pulse 94   Temp

## 2023-05-20 ENCOUNTER — PATIENT MESSAGE (OUTPATIENT)
Dept: FAMILY MEDICINE CLINIC | Age: 58
End: 2023-05-20

## 2023-05-22 ENCOUNTER — TELEPHONE (OUTPATIENT)
Dept: FAMILY MEDICINE CLINIC | Age: 58
End: 2023-05-22

## 2023-05-22 NOTE — TELEPHONE ENCOUNTER
----- Message from Mela River sent at 5/20/2023 11:31 AM EDT -----  Regarding: Xrays  Contact: [phone]  I was wondering if you compared my new xrays with the older ones of my back yet ?

## 2023-05-22 NOTE — TELEPHONE ENCOUNTER
If we are discussing lumbar XR, I have none for comparison,,,,,,,,,,,,,,,,,,,,,,,,,,,,,,,,,,,,,,,,,,,,,,djf

## 2023-05-22 NOTE — TELEPHONE ENCOUNTER
From: Mi River  To: Dr. Peter Cueva  Sent: 5/20/2023 11:31 AM EDT  Subject: Xrays    I was wondering if you compared my new xrays with the older ones of my back yet ?

## 2023-05-23 NOTE — TELEPHONE ENCOUNTER
Pt stated when she was in 5/9 you showed her an old XR and told her to have another one done and you would compare them. The only back XR in chart I saw was lumbosacral from 3/4/22. Otherwise the last XR for Lumbar is 4/10/18.

## 2023-05-23 NOTE — TELEPHONE ENCOUNTER
Sorry it was so long ago I missed it. No change im repair.  Slightly more degenerative arthritis,,,,djf

## 2023-05-24 DIAGNOSIS — K21.9 GASTROESOPHAGEAL REFLUX DISEASE WITHOUT ESOPHAGITIS: ICD-10-CM

## 2023-05-24 DIAGNOSIS — K80.20 CALCULUS OF GALLBLADDER WITHOUT CHOLECYSTITIS WITHOUT OBSTRUCTION: ICD-10-CM

## 2023-05-26 RX ORDER — PANTOPRAZOLE SODIUM 40 MG/1
40 TABLET, DELAYED RELEASE ORAL
Qty: 30 TABLET | Refills: 3 | Status: SHIPPED | OUTPATIENT
Start: 2023-05-26

## 2023-05-30 ENCOUNTER — OFFICE VISIT (OUTPATIENT)
Dept: PAIN MANAGEMENT | Age: 58
End: 2023-05-30
Payer: MEDICARE

## 2023-05-30 VITALS
SYSTOLIC BLOOD PRESSURE: 130 MMHG | WEIGHT: 202 LBS | DIASTOLIC BLOOD PRESSURE: 70 MMHG | HEART RATE: 89 BPM | BODY MASS INDEX: 30.62 KG/M2 | HEIGHT: 68 IN | TEMPERATURE: 97.7 F | RESPIRATION RATE: 18 BRPM | OXYGEN SATURATION: 98 %

## 2023-05-30 DIAGNOSIS — M47.817 LUMBOSACRAL SPONDYLOSIS WITHOUT MYELOPATHY: Primary | ICD-10-CM

## 2023-05-30 DIAGNOSIS — M96.1 POSTLAMINECTOMY SYNDROME, LUMBAR: ICD-10-CM

## 2023-05-30 DIAGNOSIS — M53.3 SACROILIAC DYSFUNCTION: ICD-10-CM

## 2023-05-30 PROCEDURE — G8427 DOCREV CUR MEDS BY ELIG CLIN: HCPCS | Performed by: ANESTHESIOLOGY

## 2023-05-30 PROCEDURE — G8417 CALC BMI ABV UP PARAM F/U: HCPCS | Performed by: ANESTHESIOLOGY

## 2023-05-30 PROCEDURE — 99213 OFFICE O/P EST LOW 20 MIN: CPT | Performed by: ANESTHESIOLOGY

## 2023-05-30 PROCEDURE — 4004F PT TOBACCO SCREEN RCVD TLK: CPT | Performed by: ANESTHESIOLOGY

## 2023-05-30 PROCEDURE — 3017F COLORECTAL CA SCREEN DOC REV: CPT | Performed by: ANESTHESIOLOGY

## 2023-05-30 NOTE — PROGRESS NOTES
Josh Amezcua presents to the [facility] on 5/30/2023. Meg Gottron is complaining of pain low back. The pain is constant. The pain is described as stabbing and tender. Pain is rated on her best day at a 4, on her worst day at a 10, and on average at a 6 on the VAS scale. She took her last dose of Neurontin and Motrin yesterday. Meg Gottron does not have issues with constipation. Any procedures since your last visit: Yes, with 50 % relief. She is  on NSAIDS and  is not on anticoagulation medications  Pacemaker or defibrillator: No   Medication Contract and Consent for Opioid Use Documents Filed        No documents found                       /70   Pulse 89   Temp 97.7 °F (36.5 °C) (Infrared)   Resp 18   Ht 5' 8\" (1.727 m)   Wt 202 lb (91.6 kg)   LMP  (LMP Unknown)   SpO2 98%   BMI 30.71 kg/m²      No LMP recorded (lmp unknown).  Patient is postmenopausal.

## 2023-05-30 NOTE — PROGRESS NOTES
pulmonary disease ([facility] [address]    Atypical chest pain    Symptomatic cholelithiasis    Gastroesophageal reflux disease without esophagitis    Chest pain    Lumbosacral spondylosis without myelopathy    Lumbar spondylosis    Asthma    Cigarette smoker    Obesity with body mass index 30 or greater    Obstructive sleep apnea of adult    Physical deconditioning     1. Supraventricular tachycardia  - Narrow complex with short RP.  - Terminated with IV Adenosine. 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  - Recurrent palpitation since the ablation however Zio in 05/30/22 showed triggered events correlated with sinus rhythm, sinus tachycardia, PACs and PATs. - On Toprol XL.  - Recently seen at [facility] in April with palpitations and EKG reported PACs and PVCs. No EKG available for review. 2. Hyperlipidemia  - On Lipitor. 3. GERD  - On Protonix. 4. Vitamin D deficiency  - On Vitamin D3.    5. Cigarette smoking  - Advised cessation. 6. COPD     7. Steroid allergies     Recommendations:    1. Will increase Toprol XL to 25 mg BID. 2. Advised to avoid caffeine, tobacco and ETOH intake. 3. Follow up in 3 months. Encouraged the patient to call the office for any questions or concerns. Thank you for allowing me to participate in your patient's care. Please call me if there are any questions or concerns. I have spent a total of 40 minutes with the patient and the family reviewing the above stated recommendations. And a total of >50% of that time involved face-to-face time providing counseling and/or coordination of care with the other providers, preparation for the clinic visit, reviewing records/tests, counseling/education of the patient, ordering medications/tests/procedures, coordinating care, and documenting clinical information in the EHR.       Morris Brittle, MD  Cardiac Electrophysiology  [facility]) Physicians  [facility] [place]: [place]

## 2023-05-30 NOTE — PROGRESS NOTES
[address]  [address] [facility], [address] [place] [address] Luke  [phone]    Follow up Note      Jonatan Dose Perico     Date of Visit:  5/30/2023    CC:  Patient presents for follow up   Chief Complaint   Patient presents with    Back Pain       HPI:  Chronic low back pain. Prior L4-L5-S1 interbody fusion > 10 yrs ago. Did well until recently. Left-sided low back pain- predominantly axial in nature. Has been evaluated : undergone CT abdomen and CT lumbar spine. Has been evaluated by NSG on 6/7/2022- no surgical indication. Pain causes functional limitations/ limits Adl's : Yes      Nursing notes and details of the pain history reviewed. Please see intake notes for details. Has been evaluated by [facility] spine - recommended facet interventions. Previous treatments:   Physical Therapy/ HEP : yes      Medications: - NSAID's : yes                         - Adjuvants or Others : yes      Lumbar spine Surgeries: yes, L4-5-S1 fusion     She has not been on anticoagulation medications      She has not been on herbal supplements. H/O Smoking: +  H/O alcohol abuse : no  H/O Illicit drug use : no     Employment: disability     Imaging:     XR LUMBAR MOTION 4V AP/LAT/ FLEX/EXT10/28/2022:  IMPRESSION:     Satisfactory postsurgical changes lower lumbar spine which is otherwise   unremarkable. Anatomic Variant: None. L4-5 is considered the level of the iliac crest   and assume there are 5 lumbar-type vertebrae. MRI of LS Spine: 5/31/2022:     FINDINGS:   BONES/ALIGNMENT: There is normal alignment of the spine. The vertebral body   heights are maintained. The bone marrow signal appears unremarkable. Again   seen are postsurgical changes from prior posterior fusion instrumentation at   L4 through S1 with bilateral transpedicular screws and parallel interlocking   rods. Inter disc spacer at L4-5 and inter disc screw at L5-S1 again noted.        SPINAL CORD:  The conus

## 2023-05-31 ENCOUNTER — OFFICE VISIT (OUTPATIENT)
Dept: NON INVASIVE DIAGNOSTICS | Age: 58
End: 2023-05-31
Payer: MEDICARE

## 2023-05-31 VITALS
HEIGHT: 68 IN | WEIGHT: 203 LBS | SYSTOLIC BLOOD PRESSURE: 100 MMHG | DIASTOLIC BLOOD PRESSURE: 76 MMHG | HEART RATE: 73 BPM | RESPIRATION RATE: 16 BRPM | BODY MASS INDEX: 30.77 KG/M2

## 2023-05-31 DIAGNOSIS — I47.1 PAROXYSMAL SVT (SUPRAVENTRICULAR TACHYCARDIA) (HCC): Primary | ICD-10-CM

## 2023-05-31 PROBLEM — J45.909 ASTHMA: Status: ACTIVE | Noted: 2022-05-19

## 2023-05-31 PROBLEM — F17.210 CIGARETTE SMOKER: Status: ACTIVE | Noted: 2022-05-19

## 2023-05-31 PROBLEM — E66.9 OBESITY WITH BODY MASS INDEX 30 OR GREATER: Status: ACTIVE | Noted: 2022-05-19

## 2023-05-31 PROBLEM — R53.81 PHYSICAL DECONDITIONING: Status: ACTIVE | Noted: 2022-05-19

## 2023-05-31 PROBLEM — G47.33 OBSTRUCTIVE SLEEP APNEA OF ADULT: Status: ACTIVE | Noted: 2022-05-19

## 2023-05-31 PROCEDURE — G8417 CALC BMI ABV UP PARAM F/U: HCPCS | Performed by: INTERNAL MEDICINE

## 2023-05-31 PROCEDURE — G8427 DOCREV CUR MEDS BY ELIG CLIN: HCPCS | Performed by: INTERNAL MEDICINE

## 2023-05-31 PROCEDURE — 99215 OFFICE O/P EST HI 40 MIN: CPT | Performed by: INTERNAL MEDICINE

## 2023-05-31 PROCEDURE — 4004F PT TOBACCO SCREEN RCVD TLK: CPT | Performed by: INTERNAL MEDICINE

## 2023-05-31 PROCEDURE — 93000 ELECTROCARDIOGRAM COMPLETE: CPT | Performed by: INTERNAL MEDICINE

## 2023-05-31 PROCEDURE — 3017F COLORECTAL CA SCREEN DOC REV: CPT | Performed by: INTERNAL MEDICINE

## 2023-05-31 RX ORDER — METOPROLOL SUCCINATE 25 MG/1
25 TABLET, EXTENDED RELEASE ORAL 2 TIMES DAILY
Qty: 180 TABLET | Refills: 3 | Status: SHIPPED | OUTPATIENT
Start: 2023-05-31

## 2023-07-14 ENCOUNTER — PREP FOR PROCEDURE (OUTPATIENT)
Dept: PAIN MANAGEMENT | Age: 58
End: 2023-07-14

## 2023-07-14 ENCOUNTER — TELEPHONE (OUTPATIENT)
Dept: PAIN MANAGEMENT | Age: 58
End: 2023-07-14

## 2023-07-14 NOTE — TELEPHONE ENCOUNTER
Patient is calling in stating her pain has returned, she is having discomfort along the left side of her back. She is interested in another injection. Would you like to see her in the office to discuss or your last OV note states you would consider an SIJ injection - would you like her scheduled for this?

## 2023-07-18 NOTE — TELEPHONE ENCOUNTER
Called pt to schedule, she is now unsure of wanting the procedure. She will call back to schedule when she is ready.

## 2023-07-22 NOTE — ED PROVIDER NOTES
reports that she does not drink alcohol or use drugs. Family History: family history includes High Blood Pressure in her father and mother. Allergies: Cortisone; Medrol [methylprednisolone]; Oxycontin [oxycodone hcl]; and Penicillins    Physical Exam           ED Triage Vitals [12/21/18 1306]   BP Temp Temp Source Pulse Resp SpO2 Height Weight   (!) 119/57 97.7 °F (36.5 °C) Oral 83 20 99 % 5' 8\" (1.727 m) 170 lb (77.1 kg)      Oxygen Saturation Interpretation: Normal.    Constitutional:  Alert, development consistent with age. HEENT:  NC/NT. Airway patent. Bilateral maxillary sinus tenderness to palpation. Tympanic membranes are clear with good landmarks bilaterally. Neck:  Normal ROM. Supple. Respiratory:  Breath sounds: equal bilaterally. Lung sounds: normal.   CV:  Regular rate and rhythm, normal heart sounds, without pathological murmurs, ectopy, gallops, or rubs. .  GI:  Abdomen Soft, nontender, good bowel sounds. No firm or pulsatile mass. Integument:  Normal turgor. Warm, dry, without visible rash. Lymphatic:  Edema:  none Bilateral lower extremity(s). Neurological:  Oriented. Motor functions intact. Lab / Imaging Results   (All laboratory and radiology results have been personally reviewed by myself)  Labs:  No results found for this visit on 12/21/18. Imaging: All Radiology results interpreted by Radiologist unless otherwise noted. No orders to display       ED Course / Medical Decision Making   Medications - No data to display     Consult(s):   None    Procedure(s):   none    Medical Decision Making:    Based on low  suspicion for pneumonia as per history/physical findings, imaging was not done. Upper respiratory infection likely viral in etiology. Not hypoxic, nothing to suggest pneumonia. Patient is well appearing, non toxic and appropriate for outpatient management. Plan is for symptom management with PCP follow up.  Patient will receive the prescription for some below for no

## 2023-08-04 DIAGNOSIS — E55.9 VITAMIN D DEFICIENCY: ICD-10-CM

## 2023-08-04 DIAGNOSIS — E78.2 MIXED HYPERLIPIDEMIA: ICD-10-CM

## 2023-08-07 RX ORDER — ATORVASTATIN CALCIUM 20 MG/1
20 TABLET, FILM COATED ORAL EVERY EVENING
Qty: 90 TABLET | Refills: 0 | Status: SHIPPED | OUTPATIENT
Start: 2023-08-07

## 2023-08-07 RX ORDER — CHOLECALCIFEROL (VITAMIN D3) 125 MCG
CAPSULE ORAL
Qty: 90 TABLET | Refills: 0 | Status: SHIPPED | OUTPATIENT
Start: 2023-08-07

## 2023-10-05 ENCOUNTER — TELEPHONE (OUTPATIENT)
Dept: FAMILY MEDICINE CLINIC | Age: 58
End: 2023-10-05

## 2023-10-05 NOTE — TELEPHONE ENCOUNTER
Spoke with pt regarding ordering mammogram. Pt stated she will call [address] to schedule because it is closer to her home.

## 2023-10-09 RX ORDER — IBUPROFEN 600 MG/1
600 TABLET ORAL EVERY 6 HOURS PRN
Qty: 20 TABLET | Refills: 0 | Status: SHIPPED | OUTPATIENT
Start: 2023-10-09 | End: 2023-10-14

## 2023-10-22 DIAGNOSIS — E78.2 MIXED HYPERLIPIDEMIA: ICD-10-CM

## 2023-10-22 DIAGNOSIS — E55.9 VITAMIN D DEFICIENCY: ICD-10-CM

## 2023-10-22 DIAGNOSIS — K21.9 GASTROESOPHAGEAL REFLUX DISEASE WITHOUT ESOPHAGITIS: ICD-10-CM

## 2023-10-22 DIAGNOSIS — K80.20 CALCULUS OF GALLBLADDER WITHOUT CHOLECYSTITIS WITHOUT OBSTRUCTION: ICD-10-CM

## 2023-10-23 RX ORDER — PANTOPRAZOLE SODIUM 40 MG/1
40 TABLET, DELAYED RELEASE ORAL
Qty: 30 TABLET | Refills: 0 | Status: SHIPPED | OUTPATIENT
Start: 2023-10-23

## 2023-10-23 RX ORDER — ATORVASTATIN CALCIUM 20 MG/1
20 TABLET, FILM COATED ORAL EVERY EVENING
Qty: 90 TABLET | Refills: 0 | Status: SHIPPED | OUTPATIENT
Start: 2023-10-23

## 2023-10-23 RX ORDER — IBUPROFEN 600 MG/1
600 TABLET ORAL EVERY 6 HOURS PRN
Qty: 20 TABLET | Refills: 0 | Status: SHIPPED | OUTPATIENT
Start: 2023-10-23 | End: 2023-10-28

## 2023-10-23 RX ORDER — CHOLECALCIFEROL (VITAMIN D3) 125 MCG
1 CAPSULE ORAL DAILY
Qty: 90 TABLET | Refills: 0 | Status: SHIPPED | OUTPATIENT
Start: 2023-10-23

## 2023-10-27 ENCOUNTER — TELEPHONE (OUTPATIENT)
Dept: FAMILY MEDICINE CLINIC | Age: 58
End: 2023-10-27

## 2023-10-27 NOTE — TELEPHONE ENCOUNTER
Patient states she tested positive for Covid the past Wednesday and her symptoms had started this past Sunday. Patient has been taking ibprofen, mucinex, and vitamin C. She would like to get a prescription for a z-toy to help with her sinus issues.      Please advise

## 2023-11-10 DIAGNOSIS — E55.9 VITAMIN D DEFICIENCY: ICD-10-CM

## 2023-11-10 RX ORDER — CHOLECALCIFEROL (VITAMIN D3) 125 MCG
1 CAPSULE ORAL DAILY
Qty: 90 TABLET | Refills: 0 | Status: SHIPPED | OUTPATIENT
Start: 2023-11-10

## 2023-12-09 DIAGNOSIS — K80.20 CALCULUS OF GALLBLADDER WITHOUT CHOLECYSTITIS WITHOUT OBSTRUCTION: ICD-10-CM

## 2023-12-09 DIAGNOSIS — K21.9 GASTROESOPHAGEAL REFLUX DISEASE WITHOUT ESOPHAGITIS: ICD-10-CM

## 2023-12-11 RX ORDER — IBUPROFEN 600 MG/1
600 TABLET ORAL EVERY 6 HOURS PRN
Qty: 20 TABLET | Refills: 0 | Status: SHIPPED | OUTPATIENT
Start: 2023-12-11 | End: 2023-12-16

## 2023-12-11 RX ORDER — PANTOPRAZOLE SODIUM 40 MG/1
40 TABLET, DELAYED RELEASE ORAL
Qty: 30 TABLET | Refills: 0 | Status: SHIPPED | OUTPATIENT
Start: 2023-12-11

## 2024-01-09 DIAGNOSIS — K80.20 CALCULUS OF GALLBLADDER WITHOUT CHOLECYSTITIS WITHOUT OBSTRUCTION: ICD-10-CM

## 2024-01-09 DIAGNOSIS — K21.9 GASTROESOPHAGEAL REFLUX DISEASE WITHOUT ESOPHAGITIS: ICD-10-CM

## 2024-01-09 RX ORDER — PANTOPRAZOLE SODIUM 40 MG/1
40 TABLET, DELAYED RELEASE ORAL
Qty: 30 TABLET | Refills: 0 | Status: SHIPPED | OUTPATIENT
Start: 2024-01-09

## 2024-01-09 NOTE — TELEPHONE ENCOUNTER
Requested Prescriptions     Pending Prescriptions Disp Refills    pantoprazole (PROTONIX) 40 MG tablet 30 tablet 0     Sig: Take 1 tablet by mouth every morning (before breakfast)       Next appt is 1/22/2024  Last appt was 5/9/2023

## 2024-01-22 ENCOUNTER — OFFICE VISIT (OUTPATIENT)
Dept: FAMILY MEDICINE CLINIC | Age: 59
End: 2024-01-22
Payer: MEDICARE

## 2024-01-22 VITALS
TEMPERATURE: 97.6 F | SYSTOLIC BLOOD PRESSURE: 118 MMHG | WEIGHT: 193 LBS | OXYGEN SATURATION: 98 % | RESPIRATION RATE: 16 BRPM | HEIGHT: 68 IN | DIASTOLIC BLOOD PRESSURE: 80 MMHG | BODY MASS INDEX: 29.25 KG/M2 | HEART RATE: 77 BPM

## 2024-01-22 DIAGNOSIS — Z12.31 ENCOUNTER FOR SCREENING MAMMOGRAM FOR MALIGNANT NEOPLASM OF BREAST: ICD-10-CM

## 2024-01-22 DIAGNOSIS — F17.210 CIGARETTE SMOKER: Primary | ICD-10-CM

## 2024-01-22 DIAGNOSIS — E78.2 MIXED HYPERLIPIDEMIA: ICD-10-CM

## 2024-01-22 PROCEDURE — G0439 PPPS, SUBSEQ VISIT: HCPCS | Performed by: FAMILY MEDICINE

## 2024-01-22 PROCEDURE — 3017F COLORECTAL CA SCREEN DOC REV: CPT | Performed by: FAMILY MEDICINE

## 2024-01-22 PROCEDURE — G8484 FLU IMMUNIZE NO ADMIN: HCPCS | Performed by: FAMILY MEDICINE

## 2024-01-22 RX ORDER — NICOTINE 21 MG/24HR
1 PATCH, TRANSDERMAL 24 HOURS TRANSDERMAL EVERY 24 HOURS
Qty: 30 PATCH | Refills: 3 | Status: SHIPPED | OUTPATIENT
Start: 2024-01-22

## 2024-01-22 RX ORDER — ATORVASTATIN CALCIUM 20 MG/1
20 TABLET, FILM COATED ORAL EVERY EVENING
Qty: 90 TABLET | Refills: 0 | Status: SHIPPED | OUTPATIENT
Start: 2024-01-22

## 2024-01-22 RX ORDER — BUPROPION HYDROCHLORIDE 150 MG/1
150 TABLET ORAL EVERY MORNING
Qty: 30 TABLET | Refills: 3 | Status: SHIPPED | OUTPATIENT
Start: 2024-01-22

## 2024-01-22 ASSESSMENT — PATIENT HEALTH QUESTIONNAIRE - PHQ9
SUM OF ALL RESPONSES TO PHQ QUESTIONS 1-9: 0
SUM OF ALL RESPONSES TO PHQ QUESTIONS 1-9: 0
SUM OF ALL RESPONSES TO PHQ9 QUESTIONS 1 & 2: 0
SUM OF ALL RESPONSES TO PHQ QUESTIONS 1-9: 0
2. FEELING DOWN, DEPRESSED OR HOPELESS: 0
SUM OF ALL RESPONSES TO PHQ QUESTIONS 1-9: 0
1. LITTLE INTEREST OR PLEASURE IN DOING THINGS: 0

## 2024-01-22 ASSESSMENT — LIFESTYLE VARIABLES
HOW MANY STANDARD DRINKS CONTAINING ALCOHOL DO YOU HAVE ON A TYPICAL DAY: 3 OR 4
HOW OFTEN DO YOU HAVE A DRINK CONTAINING ALCOHOL: MONTHLY OR LESS

## 2024-01-22 NOTE — PATIENT INSTRUCTIONS

## 2024-01-22 NOTE — PROGRESS NOTES
Medicare Annual Wellness Visit    Niecy River is here for Medicare AWV and Heartburn (Patient would like to change her medication)    Assessment & Plan   Cigarette smoker  -     nicotine (NICODERM CQ) 14 MG/24HR; Place 1 patch onto the skin every 24 hours, Disp-30 patch, R-3Normal  -     buPROPion (WELLBUTRIN XL) 150 MG extended release tablet; Take 1 tablet by mouth every morning, Disp-30 tablet, R-3Normal  Mixed hyperlipidemia  -     atorvastatin (LIPITOR) 20 MG tablet; Take 1 tablet by mouth every evening Takes at 4PM, Disp-90 tablet, R-0Normal    Recommendations for Preventive Services Due: see orders and patient instructions/AVS.  Recommended screening schedule for the next 5-10 years is provided to the patient in written form: see Patient Instructions/AVS.     No follow-ups on file.     Subjective   The following acute and/or chronic problems were also addressed today:  Cholesterol    Patient's complete Health Risk Assessment and screening values have been reviewed and are found in Flowsheets. The following problems were reviewed today and where indicated follow up appointments were made and/or referrals ordered.    Positive Risk Factor Screenings with Interventions:                Activity, Diet, and Weight:  On average, how many days per week do you engage in moderate to strenuous exercise (like a brisk walk)?: 0 days  On average, how many minutes do you engage in exercise at this level?: 0 min    Do you eat balanced/healthy meals regularly?: (!) No    Body mass index is 29.35 kg/m².      Inactivity Interventions:  none  Do you eat balanced/healthy meals regularly Interventions:  yes           Safety:  Do you have non-slip mats or non-slip surfaces or shower bars or grab bars in your shower or bathtub?: (!) No  Interventions:  none     Advanced Directives:  Do you have a Living Will?: (!) No    Intervention:  see ACP note        Tobacco Use:  Tobacco Use: High Risk (1/22/2024)    Patient History

## 2024-01-23 ENCOUNTER — TELEPHONE (OUTPATIENT)
Dept: FAMILY MEDICINE CLINIC | Age: 59
End: 2024-01-23

## 2024-01-23 DIAGNOSIS — M51.36 DDD (DEGENERATIVE DISC DISEASE), LUMBAR: Primary | ICD-10-CM

## 2024-01-23 DIAGNOSIS — K21.9 GASTROESOPHAGEAL REFLUX DISEASE WITHOUT ESOPHAGITIS: ICD-10-CM

## 2024-01-23 NOTE — TELEPHONE ENCOUNTER
Patient requesting a referral to Dr. Noe for pain management, and would like prevacid sent to [facility].

## 2024-01-24 RX ORDER — LANSOPRAZOLE 30 MG/1
30 CAPSULE, DELAYED RELEASE ORAL DAILY
Qty: 90 CAPSULE | Refills: 1 | Status: SHIPPED | OUTPATIENT
Start: 2024-01-24

## 2024-01-24 NOTE — TELEPHONE ENCOUNTER
ASSESSMENT/PLAN:    1. DDD (degenerative disc disease), lumbar  - External Referral To Pain Clinic    2. Gastroesophageal reflux disease without esophagitis  - lansoprazole (PREVACID) 30 MG delayed release capsule; Take 1 capsule by mouth daily  Dispense: 90 capsule; Refill: 1        FOLLOW-UP:  prn

## 2024-02-18 ENCOUNTER — HOSPITAL ENCOUNTER (OUTPATIENT)
Dept: CT IMAGING | Age: 59
Discharge: HOME | End: 2024-02-18
Attending: INTERNAL MEDICINE
Payer: MEDICARE

## 2024-02-18 DIAGNOSIS — Z87.891 PERSONAL HISTORY OF TOBACCO USE, PRESENTING HAZARDS TO HEALTH: ICD-10-CM

## 2024-02-18 DIAGNOSIS — F17.210 CIGARETTE SMOKER: ICD-10-CM

## 2024-02-18 PROCEDURE — 71271 CT THORAX LUNG CANCER SCR C-: CPT

## 2024-02-21 ENCOUNTER — TELEPHONE (OUTPATIENT)
Dept: CASE MANAGEMENT | Age: 59
End: 2024-02-21

## 2024-02-21 ENCOUNTER — OFFICE VISIT (OUTPATIENT)
Dept: NON INVASIVE DIAGNOSTICS | Age: 59
End: 2024-02-21
Payer: MEDICARE

## 2024-02-21 VITALS
HEIGHT: 68 IN | RESPIRATION RATE: 16 BRPM | OXYGEN SATURATION: 93 % | HEART RATE: 62 BPM | DIASTOLIC BLOOD PRESSURE: 70 MMHG | SYSTOLIC BLOOD PRESSURE: 140 MMHG | WEIGHT: 190 LBS | BODY MASS INDEX: 28.79 KG/M2

## 2024-02-21 DIAGNOSIS — F17.210 CIGARETTE SMOKER: ICD-10-CM

## 2024-02-21 DIAGNOSIS — I47.10 PAROXYSMAL SVT (SUPRAVENTRICULAR TACHYCARDIA): Primary | ICD-10-CM

## 2024-02-21 DIAGNOSIS — I47.10 SVT (SUPRAVENTRICULAR TACHYCARDIA): ICD-10-CM

## 2024-02-21 DIAGNOSIS — R53.81 PHYSICAL DECONDITIONING: ICD-10-CM

## 2024-02-21 PROCEDURE — 3017F COLORECTAL CA SCREEN DOC REV: CPT | Performed by: NURSE PRACTITIONER

## 2024-02-21 PROCEDURE — 93000 ELECTROCARDIOGRAM COMPLETE: CPT | Performed by: INTERNAL MEDICINE

## 2024-02-21 PROCEDURE — 99213 OFFICE O/P EST LOW 20 MIN: CPT | Performed by: NURSE PRACTITIONER

## 2024-02-21 PROCEDURE — G8484 FLU IMMUNIZE NO ADMIN: HCPCS | Performed by: NURSE PRACTITIONER

## 2024-02-21 PROCEDURE — G8417 CALC BMI ABV UP PARAM F/U: HCPCS | Performed by: NURSE PRACTITIONER

## 2024-02-21 PROCEDURE — 4004F PT TOBACCO SCREEN RCVD TLK: CPT | Performed by: NURSE PRACTITIONER

## 2024-02-21 PROCEDURE — G8427 DOCREV CUR MEDS BY ELIG CLIN: HCPCS | Performed by: NURSE PRACTITIONER

## 2024-02-21 NOTE — TELEPHONE ENCOUNTER
No call, encounter opened to process CT Lung Screening.    CT Lung Screen: 2/18/2024    IMPRESSION:  No suspicious pulmonary nodule.     LUNG RADS:  Lung-RADS 1 - Negative (v2022)     Management:  12 month screening LDCT     RECOMMENDATIONS:  If you would like to register your patient with the [facility] Lung Nodule/Lung  Cancer Screening Program, please contact the Nurse Navigator at  [phone].    Pack years: 30    Social History     Tobacco Use  Smoking Status: Current Every Day Smoker    Start Date:    Quit Date:    Types: Cigarettes   Packs/Day: 1   Years: 30   Pack Years: 30   Smokeless Tobacco: Never         Results letter sent to patient via my chart or mailed.     Asuncion Monge  Imaging Navigator   [facility]   [phone]

## 2024-02-21 NOTE — PROGRESS NOTES
[facility]  Outpatient Progress Note  Niecy River  1965  Date of Service: 2/21/2024  PCP: Doe Song DO  Electrophysiologist: Dr. Gonzalez         Subjective: Niecy River is seen for follow-up and management of: Palpitations    Last seen in the office with Dr. Gonzalez on 5/31/2023    PMH as noted below significant for palpitations, PVCs, PAC's and PSVT status post AVNRT ablation.  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 She subsequently underwent slow pathway modification. After the procedure, she reports occasional palpitations. Fourteen day cardiac monitor on 10/12/22 showed 8 brief PATs longest of 5 beats. Triggered events and reported symptoms were correlated with sinus rhythm, sinus tachycardia, PACs and SVTs.   She was seen at [facility] and had EKG done which showed PACs and PVCs. She reports chronic shortness of breath from COPD.   Toprol was increased to 25 mg BID but patient did not increase.  She had increased her water intake and decrease caffeine intake.  Was taken off of symbicort, palpitations got better.   Started half caffeine coffee and only decaf tea. Has been drinking water.   She has been feeling overall better.  She has sinus rhythm with no premature beats today on her EKG in the office.  She is trying to quit smoking and has started Wellbutrin.      Patient Active Problem List   Diagnosis    DJD (degenerative joint disease) of knee    Medial meniscus tear    Elbow arthritis    DDD (degenerative disc disease), lumbar    Elbow pain    Lateral epicondylitis    SVT (supraventricular tachycardia) ([facility])    Palpitations    Primary osteoarthritis of left knee    Mixed hyperlipidemia    Vitamin D deficiency    Bronchitis, acute, with bronchospasm    Exertional dyspnea    Primary osteoarthritis of right knee    Acute medial meniscal tear    Complex tear of lateral meniscus of

## 2024-02-28 ENCOUNTER — OFFICE VISIT (OUTPATIENT)
Dept: FAMILY MEDICINE CLINIC | Age: 59
End: 2024-02-28
Payer: MEDICARE

## 2024-02-28 VITALS
WEIGHT: 190.2 LBS | RESPIRATION RATE: 16 BRPM | DIASTOLIC BLOOD PRESSURE: 70 MMHG | HEIGHT: 68 IN | SYSTOLIC BLOOD PRESSURE: 112 MMHG | TEMPERATURE: 97.3 F | BODY MASS INDEX: 28.82 KG/M2 | OXYGEN SATURATION: 97 % | HEART RATE: 74 BPM

## 2024-02-28 DIAGNOSIS — M47.817 LUMBOSACRAL SPONDYLOSIS WITHOUT MYELOPATHY: Primary | ICD-10-CM

## 2024-02-28 DIAGNOSIS — J44.9 CHRONIC OBSTRUCTIVE PULMONARY DISEASE, UNSPECIFIED COPD TYPE (HCC): ICD-10-CM

## 2024-02-28 PROCEDURE — G8417 CALC BMI ABV UP PARAM F/U: HCPCS | Performed by: FAMILY MEDICINE

## 2024-02-28 PROCEDURE — 4004F PT TOBACCO SCREEN RCVD TLK: CPT | Performed by: FAMILY MEDICINE

## 2024-02-28 PROCEDURE — 99213 OFFICE O/P EST LOW 20 MIN: CPT | Performed by: FAMILY MEDICINE

## 2024-02-28 PROCEDURE — G8484 FLU IMMUNIZE NO ADMIN: HCPCS | Performed by: FAMILY MEDICINE

## 2024-02-28 PROCEDURE — 3017F COLORECTAL CA SCREEN DOC REV: CPT | Performed by: FAMILY MEDICINE

## 2024-02-28 PROCEDURE — 3023F SPIROM DOC REV: CPT | Performed by: FAMILY MEDICINE

## 2024-02-28 PROCEDURE — G8427 DOCREV CUR MEDS BY ELIG CLIN: HCPCS | Performed by: FAMILY MEDICINE

## 2024-02-28 ASSESSMENT — PATIENT HEALTH QUESTIONNAIRE - PHQ9
1. LITTLE INTEREST OR PLEASURE IN DOING THINGS: 0
SUM OF ALL RESPONSES TO PHQ QUESTIONS 1-9: 0
SUM OF ALL RESPONSES TO PHQ9 QUESTIONS 1 & 2: 0
SUM OF ALL RESPONSES TO PHQ QUESTIONS 1-9: 0
2. FEELING DOWN, DEPRESSED OR HOPELESS: 0

## 2024-02-28 ASSESSMENT — ENCOUNTER SYMPTOMS
VOMITING: 0
ABDOMINAL PAIN: 0
CONSTIPATION: 0
BLOOD IN STOOL: 0
SHORTNESS OF BREATH: 0
WHEEZING: 0
COUGH: 0
NAUSEA: 0
DIARRHEA: 0

## 2024-02-28 NOTE — PROGRESS NOTES
Reflexes: Reflexes normal.            On this date 2/28/2024 I have spent 25 minutes reviewing previous notes, test results and face to face with the patient discussing the diagnosis and importance of compliance with the treatment plan as well as documenting on the day of the visit.      An electronic signature was used to authenticate this note.    --Doe Song, DO

## 2024-04-04 ENCOUNTER — OFFICE VISIT (OUTPATIENT)
Dept: NEUROSURGERY | Age: 59
End: 2024-04-04
Payer: MEDICARE

## 2024-04-04 VITALS — BODY MASS INDEX: 29.82 KG/M2 | WEIGHT: 190 LBS | RESPIRATION RATE: 16 BRPM | HEIGHT: 67 IN

## 2024-04-04 DIAGNOSIS — Z98.1 HISTORY OF LUMBAR FUSION: ICD-10-CM

## 2024-04-04 DIAGNOSIS — M54.50 ACUTE BILATERAL LOW BACK PAIN WITHOUT SCIATICA: Primary | ICD-10-CM

## 2024-04-04 PROCEDURE — 99212 OFFICE O/P EST SF 10 MIN: CPT

## 2024-04-04 PROCEDURE — G8427 DOCREV CUR MEDS BY ELIG CLIN: HCPCS | Performed by: PHYSICIAN ASSISTANT

## 2024-04-04 PROCEDURE — 3017F COLORECTAL CA SCREEN DOC REV: CPT | Performed by: PHYSICIAN ASSISTANT

## 2024-04-04 PROCEDURE — 99214 OFFICE O/P EST MOD 30 MIN: CPT | Performed by: PHYSICIAN ASSISTANT

## 2024-04-04 PROCEDURE — 4004F PT TOBACCO SCREEN RCVD TLK: CPT | Performed by: PHYSICIAN ASSISTANT

## 2024-04-04 PROCEDURE — G8417 CALC BMI ABV UP PARAM F/U: HCPCS | Performed by: PHYSICIAN ASSISTANT

## 2024-04-04 NOTE — PROGRESS NOTES
Problem Focused Office Visit     Subjective: Niecy River is a 56 year old female with a past medical history of HLD, COPD, cervical cancer, osteoarthritis, GERD, hiatal hernia, SVT, and hx L4-S1 fusion approximately 17 years ago in [place]. Pt smokes 1-1/2ppd.     She presents to the office today c/o acute low back pain and mid back pain after bending over x2 1/2 weeks. Describes the pain as sharp, stabbing, and severe. Any type of movement makes the symptoms worse. Pain does not radiate. She follows with Dr. Noe for pain management. She has tried Norco and muscle relaxers without significant pain relief. Denies recent injections. Denies loss of bowel or bladder, saddle anesthesia, pain down the legs, numbness, tingling, fever, chills, N/V, SOB, or chest pain      Physical Exam:              WDWN, distress due to pain              Non-labored breathing               Vitals Stable              Alert and oriented x3              CN 3-12 intact              PERRL              EOMI              Unable to complete exam due to severe pain in back              Sensation to LT intact bilaterally       Assessment: This is a 58 y.o.  female presenting for evaluation of acute low back pain. Hx L4-S1 fusion     Plan:  -Lumbar and thoracic CT myelogram ordered to evaluate for stenosis and for possible interventions such as injections or surgery, cannot obtain MRI due to previously placed hardware  -OARRS report reviewed   -Return to Neurosurgery clinic after completion of imaging to discuss results and further treatment plan  -Call/return sooner if symptoms worsen or new issues arise in the interim       Electronically signed by Shirlene Guzman PA-C on 4/4/2024 at 10:38 AM

## 2024-04-05 RX ORDER — METHOCARBAMOL 750 MG/1
TABLET, FILM COATED ORAL
COMMUNITY
Start: 2024-03-27

## 2024-04-05 RX ORDER — HYDROCODONE BITARTRATE AND ACETAMINOPHEN 5; 325 MG/1; MG/1
TABLET ORAL
COMMUNITY
Start: 2024-03-18

## 2024-04-22 ENCOUNTER — HOSPITAL ENCOUNTER (OUTPATIENT)
Age: 59
Discharge: HOME | End: 2024-04-22
Payer: MEDICARE

## 2024-04-22 ENCOUNTER — TELEPHONE (OUTPATIENT)
Dept: NEUROSURGERY | Age: 59
End: 2024-04-22

## 2024-04-22 DIAGNOSIS — Z98.1 HISTORY OF LUMBAR FUSION: ICD-10-CM

## 2024-04-22 DIAGNOSIS — M54.50 ACUTE BILATERAL LOW BACK PAIN WITHOUT SCIATICA: ICD-10-CM

## 2024-04-22 LAB
INR PPP: 1
PLATELET # BLD AUTO: 166 K/UL (ref 130–450)
PROTHROMBIN TIME: 11.6 SEC (ref 9.3–12.4)

## 2024-04-22 PROCEDURE — 85049 AUTOMATED PLATELET COUNT: CPT

## 2024-04-22 PROCEDURE — 85610 PROTHROMBIN TIME: CPT

## 2024-04-22 PROCEDURE — 36415 COLL VENOUS BLD VENIPUNCTURE: CPT

## 2024-04-22 NOTE — TELEPHONE ENCOUNTER
CT/Myelogram myelogram sites: Thoracic/lumbar    Date: 4/29/2024                     Facility: Main    Arrival Time:7:30 am    NPO after Midnight  Bring   Will be at facility 4-6hrs  Bring ID, insurance cards and list of current medications.    ASA/Ibuprofen/Blood Thinners to be held for 5 days prior     Antidepressants hold for 3 days prior    Labs: PT/INR, Platelets  PATIENT TO COMPLETE LABS 1 WEEK PRIOR AT A [facility] FACILITY    Does patient have DIABETIC MONITOR N/A  Must remove monitor prior to procedure. If near change time, patient should wait til after procedure to change unit adhesive.    Note who informed patient on instructions: FERMIN FROM IR SCHEDULED PATIENT, GAVE TIME, DATE AND ALL INSTRUCTIONS.

## 2024-04-26 DIAGNOSIS — E78.2 MIXED HYPERLIPIDEMIA: ICD-10-CM

## 2024-04-29 ENCOUNTER — HOSPITAL ENCOUNTER (OUTPATIENT)
Dept: GENERAL RADIOLOGY | Age: 59
Discharge: HOME | End: 2024-05-01
Payer: MEDICARE

## 2024-04-29 VITALS
WEIGHT: 190 LBS | HEART RATE: 79 BPM | OXYGEN SATURATION: 94 % | SYSTOLIC BLOOD PRESSURE: 105 MMHG | RESPIRATION RATE: 18 BRPM | DIASTOLIC BLOOD PRESSURE: 63 MMHG | HEIGHT: 68 IN | TEMPERATURE: 98.1 F | BODY MASS INDEX: 28.79 KG/M2

## 2024-04-29 DIAGNOSIS — Z98.1 HISTORY OF LUMBAR FUSION: ICD-10-CM

## 2024-04-29 DIAGNOSIS — M54.50 ACUTE BILATERAL LOW BACK PAIN WITHOUT SCIATICA: ICD-10-CM

## 2024-04-29 PROCEDURE — 72265 MYELOGRAPHY L-S SPINE: CPT

## 2024-04-29 PROCEDURE — 72255 MYELOGRAPHY THORACIC SPINE: CPT

## 2024-04-29 RX ORDER — ATORVASTATIN CALCIUM 20 MG/1
20 TABLET, FILM COATED ORAL EVERY EVENING
Qty: 90 TABLET | Refills: 0 | Status: SHIPPED | OUTPATIENT
Start: 2024-04-29

## 2024-04-29 NOTE — BRIEF OP NOTE
Brief-Op Note  ______________________________________________________________      IR/FL LUMBAR PUNCTURE WITH CONTRAST  INJECTION   FOR CT MYELOGRAM  [facility]      Patient Name: Niecy River   YOB: 1965  Medical Record Number: [id number]  Date of Procedure: 4/29/24  Room/Bed: Room/bed info not found    Preoperative diagnosis: Lumbar Radiculopathy (M54.16 [ICD-10-CM]) and Prior Lumbar Fusion (Z98.1 [ICD-10-CM])    Postoperative diagnosis: Same    Consent: Informed consent was obtained from the patient prior to the procedure.      Procedure Type: Fluoroscopic-guided lumbar puncture with contrast injection for CT Myelogram Imaging of thoracic, lumbar Spine.    Anesthesia: Local anesthesia administered subcutaneously.    Performed by: JENI Cameron under on-site supervision by Mayela Loza MD.    Estimated blood loss: None    Complications: None    Implants: None    Electronically signed by JENI Cameron   DD: 4/29/24  12:22 PM

## 2024-04-29 NOTE — TELEPHONE ENCOUNTER
Last seen 2/28/2024  Next appt Visit date not found    Rx pended. Please review and sign.    Electronically signed by PAKO MILLER MA on 4/29/24 at 8:23 AM EDT

## 2024-04-29 NOTE — H&P
Interventional Radiology  Pre-operative History and Physical  [facility] RADIOLOGY      DIAGNOSIS:    Patient Active Problem List   Diagnosis    DJD (degenerative joint disease) of knee    Medial meniscus tear    Elbow arthritis    DDD (degenerative disc disease), lumbar    Elbow pain    Lateral epicondylitis    SVT (supraventricular tachycardia) ([facility])    Palpitations    Primary osteoarthritis of left knee    Mixed hyperlipidemia    Vitamin D deficiency    Bronchitis, acute, with bronchospasm    Exertional dyspnea    Primary osteoarthritis of right knee    Acute medial meniscal tear    Complex tear of lateral meniscus of right knee as current injury    Synovitis    Chronic obstructive pulmonary disease (HCC)    Atypical chest pain    Symptomatic cholelithiasis    Gastroesophageal reflux disease without esophagitis    Chest pain    Lumbosacral spondylosis without myelopathy    Lumbar spondylosis    Asthma    Cigarette smoker    Obesity with body mass index 30 or greater    Obstructive sleep apnea of adult    Physical deconditioning       Active Problems:    * No active hospital problems. *      PROCEDURE:  Lumbar puncture with contrast injection for thoracic, lumbar CT Myelogram.      Current Outpatient Medications:     atorvastatin (LIPITOR) 20 MG tablet, TAKE 1 TABLET BY MOUTH EVERY EVENING TAKES AT 4PM, Disp: 90 tablet, Rfl: 0    HYDROcodone-acetaminophen (NORCO) 5-325 MG per tablet, , Disp: , Rfl:     methocarbamol (ROBAXIN) 750 MG tablet, , Disp: , Rfl:     lansoprazole (PREVACID) 30 MG delayed release capsule, Take 1 capsule by mouth daily, Disp: 90 capsule, Rfl: 1    buPROPion (WELLBUTRIN XL) 150 MG extended release tablet, Take 1 tablet by mouth every morning, Disp: 30 tablet, Rfl: 3    ibuprofen (ADVIL;MOTRIN) 600 MG tablet, Take 1 tablet by mouth every 6 hours as needed for Pain, Disp: 20 tablet, Rfl: 0    Cholecalciferol (VITAMIN D3) 50 MCG (2000 UT) TABS, Take 1 tablet by mouth daily,

## 2024-04-29 NOTE — PROCEDURES
0922 Patient unable to tolerate procedure, only received lidocaine injection. Discharge papers reviewed with patient. Patient taken to door via wheelchair accompanied by two friends.

## 2024-05-09 DIAGNOSIS — E55.9 VITAMIN D DEFICIENCY: ICD-10-CM

## 2024-05-09 RX ORDER — CHOLECALCIFEROL (VITAMIN D3) 125 MCG
1 CAPSULE ORAL DAILY
Qty: 90 TABLET | Refills: 0 | Status: SHIPPED | OUTPATIENT
Start: 2024-05-09

## 2024-05-09 RX ORDER — IBUPROFEN 600 MG/1
600 TABLET ORAL EVERY 6 HOURS PRN
Qty: 90 TABLET | Refills: 0 | Status: SHIPPED | OUTPATIENT
Start: 2024-05-09

## 2024-05-09 NOTE — TELEPHONE ENCOUNTER
Requested Prescriptions     Pending Prescriptions Disp Refills    ibuprofen (ADVIL;MOTRIN) 600 MG tablet 90 tablet 0     Sig: Take 1 tablet by mouth every 6 hours as needed for Pain       Next appt is Visit date not found  Last appt was 2/28/2024

## 2024-05-09 NOTE — TELEPHONE ENCOUNTER
Requested Prescriptions     Pending Prescriptions Disp Refills    Cholecalciferol (VITAMIN D3) 50 MCG (2000 UT) TABS 90 tablet 0     Sig: Take 1 tablet by mouth daily       Next appt is 5/9/2024  Last appt was 2/28/2024

## 2024-05-30 ENCOUNTER — HOSPITAL ENCOUNTER (OUTPATIENT)
Dept: MAMMOGRAPHY | Age: 59
Discharge: HOME | End: 2024-06-01

## 2024-05-30 DIAGNOSIS — Z12.31 ENCOUNTER FOR SCREENING MAMMOGRAM FOR MALIGNANT NEOPLASM OF BREAST: ICD-10-CM

## 2024-06-14 DIAGNOSIS — I47.10 PAROXYSMAL SVT (SUPRAVENTRICULAR TACHYCARDIA) (HCC): ICD-10-CM

## 2024-06-14 RX ORDER — METOPROLOL SUCCINATE 25 MG/1
25 TABLET, EXTENDED RELEASE ORAL DAILY
Qty: 180 TABLET | Refills: 3 | Status: SHIPPED | OUTPATIENT
Start: 2024-06-14

## 2024-07-13 DIAGNOSIS — K21.9 GASTROESOPHAGEAL REFLUX DISEASE WITHOUT ESOPHAGITIS: ICD-10-CM

## 2024-07-15 RX ORDER — LANSOPRAZOLE 30 MG/1
30 CAPSULE, DELAYED RELEASE ORAL DAILY
Qty: 90 CAPSULE | Refills: 1 | Status: SHIPPED | OUTPATIENT
Start: 2024-07-15

## 2024-07-30 DIAGNOSIS — F17.210 CIGARETTE SMOKER: ICD-10-CM

## 2024-07-30 DIAGNOSIS — E55.9 VITAMIN D DEFICIENCY: ICD-10-CM

## 2024-07-30 DIAGNOSIS — E78.2 MIXED HYPERLIPIDEMIA: ICD-10-CM

## 2024-07-30 RX ORDER — ATORVASTATIN CALCIUM 20 MG/1
20 TABLET, FILM COATED ORAL EVERY EVENING
Qty: 90 TABLET | Refills: 0 | Status: SHIPPED | OUTPATIENT
Start: 2024-07-30

## 2024-07-30 RX ORDER — CHOLECALCIFEROL (VITAMIN D3) 50 MCG
1 TABLET ORAL DAILY
Qty: 90 TABLET | Refills: 0 | Status: SHIPPED | OUTPATIENT
Start: 2024-07-30

## 2024-07-30 RX ORDER — BUPROPION HYDROCHLORIDE 150 MG/1
150 TABLET ORAL EVERY MORNING
Qty: 30 TABLET | Refills: 3 | Status: SHIPPED | OUTPATIENT
Start: 2024-07-30

## 2024-07-30 NOTE — TELEPHONE ENCOUNTER
Last seen 2/28/2024  Next appt     Requested Prescriptions     Pending Prescriptions Disp Refills    atorvastatin (LIPITOR) 20 MG tablet 90 tablet 0     Sig: Take 1 tablet by mouth every evening Takes at 4PM    Cholecalciferol (VITAMIN D3) 50 MCG (2000 UT) TABS 90 tablet 0     Sig: Take 1 tablet by mouth daily      Rx pended. Please review and sign.    Electronically signed by PAKO MILLER MA on 7/30/24 at 11:08 AM EDT

## 2024-07-30 NOTE — TELEPHONE ENCOUNTER
Last seen 2/28/2024  Next appt 1/23/2025    Requested Prescriptions     Pending Prescriptions Disp Refills    buPROPion (WELLBUTRIN XL) 150 MG extended release tablet 30 tablet 3     Sig: Take 1 tablet by mouth every morning      Rx pended. Please review and sign.    Electronically signed by PAKO MILLER MA on 7/30/24 at 11:08 AM EDT

## 2024-09-24 DIAGNOSIS — E78.2 MIXED HYPERLIPIDEMIA: ICD-10-CM

## 2024-09-25 RX ORDER — ATORVASTATIN CALCIUM 20 MG/1
20 TABLET, FILM COATED ORAL EVERY EVENING
Qty: 30 TABLET | Refills: 0 | Status: SHIPPED | OUTPATIENT
Start: 2024-09-25

## 2024-09-26 ENCOUNTER — OFFICE VISIT (OUTPATIENT)
Dept: FAMILY MEDICINE CLINIC | Age: 59
End: 2024-09-26

## 2024-09-26 VITALS
WEIGHT: 176 LBS | DIASTOLIC BLOOD PRESSURE: 70 MMHG | TEMPERATURE: 97.8 F | OXYGEN SATURATION: 98 % | BODY MASS INDEX: 26.67 KG/M2 | SYSTOLIC BLOOD PRESSURE: 118 MMHG | HEIGHT: 68 IN | HEART RATE: 73 BPM | RESPIRATION RATE: 16 BRPM

## 2024-09-26 DIAGNOSIS — J01.90 ACUTE BACTERIAL SINUSITIS: ICD-10-CM

## 2024-09-26 DIAGNOSIS — B96.89 ACUTE BACTERIAL SINUSITIS: ICD-10-CM

## 2024-09-26 DIAGNOSIS — R09.81 NASAL CONGESTION: Primary | ICD-10-CM

## 2024-09-26 DIAGNOSIS — J44.1 COPD EXACERBATION (HCC): ICD-10-CM

## 2024-09-26 LAB
INFLUENZA A ANTIBODY: NORMAL
INFLUENZA B ANTIBODY: NORMAL
Lab: NORMAL
PERFORMING INSTRUMENT: NORMAL
QC PASS/FAIL: NORMAL
SARS-COV-2, POC: NORMAL

## 2024-09-26 RX ORDER — FLUTICASONE PROPIONATE 50 MCG
2 SPRAY, SUSPENSION (ML) NASAL DAILY
Qty: 16 G | Refills: 0 | Status: SHIPPED | OUTPATIENT
Start: 2024-09-26

## 2024-09-26 RX ORDER — AZITHROMYCIN 250 MG/1
TABLET, FILM COATED ORAL
Qty: 1 PACKET | Refills: 0 | Status: SHIPPED | OUTPATIENT
Start: 2024-09-26

## 2024-09-26 SDOH — ECONOMIC STABILITY: FOOD INSECURITY: WITHIN THE PAST 12 MONTHS, THE FOOD YOU BOUGHT JUST DIDN'T LAST AND YOU DIDN'T HAVE MONEY TO GET MORE.: NEVER TRUE

## 2024-09-26 SDOH — ECONOMIC STABILITY: INCOME INSECURITY: HOW HARD IS IT FOR YOU TO PAY FOR THE VERY BASICS LIKE FOOD, HOUSING, MEDICAL CARE, AND HEATING?: NOT HARD AT ALL

## 2024-09-26 SDOH — ECONOMIC STABILITY: FOOD INSECURITY: WITHIN THE PAST 12 MONTHS, YOU WORRIED THAT YOUR FOOD WOULD RUN OUT BEFORE YOU GOT MONEY TO BUY MORE.: NEVER TRUE

## 2024-09-26 ASSESSMENT — ENCOUNTER SYMPTOMS
VOMITING: 0
WHEEZING: 0
SHORTNESS OF BREATH: 1
SORE THROAT: 0
COUGH: 1
SINUS PRESSURE: 1
CHEST TIGHTNESS: 0
NAUSEA: 0
DIARRHEA: 0

## 2024-09-26 ASSESSMENT — PATIENT HEALTH QUESTIONNAIRE - PHQ9
SUM OF ALL RESPONSES TO PHQ9 QUESTIONS 1 & 2: 0
2. FEELING DOWN, DEPRESSED OR HOPELESS: NOT AT ALL
SUM OF ALL RESPONSES TO PHQ QUESTIONS 1-9: 0
SUM OF ALL RESPONSES TO PHQ QUESTIONS 1-9: 0
1. LITTLE INTEREST OR PLEASURE IN DOING THINGS: NOT AT ALL
SUM OF ALL RESPONSES TO PHQ QUESTIONS 1-9: 0
SUM OF ALL RESPONSES TO PHQ QUESTIONS 1-9: 0

## 2024-09-30 ENCOUNTER — TELEPHONE (OUTPATIENT)
Dept: FAMILY MEDICINE CLINIC | Age: 59
End: 2024-09-30

## 2024-09-30 NOTE — TELEPHONE ENCOUNTER
Patient was diagnosed with Flu A 9/26/24 and now is having a difficult time hearing out of her left ear, she went to [facility] last night and they stated no wax or redness. She is very congested just feels like her ear may be plugged denies any pain at this time. She was given a z-toy, inhaler, and nose spray Thursday and does feel a little better except for the hearing and congestion.    Please advise.

## 2024-09-30 NOTE — TELEPHONE ENCOUNTER
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 It says so in chart but if its not true we can start nasal steroids,,,djf

## 2024-10-07 RX ORDER — IBUPROFEN 600 MG/1
600 TABLET, FILM COATED ORAL EVERY 6 HOURS PRN
Qty: 90 TABLET | Refills: 0 | Status: SHIPPED | OUTPATIENT
Start: 2024-10-07

## 2024-10-31 DIAGNOSIS — E55.9 VITAMIN D DEFICIENCY: ICD-10-CM

## 2024-10-31 RX ORDER — CHOLECALCIFEROL (VITAMIN D3) 50 MCG
1 TABLET ORAL DAILY
Qty: 90 TABLET | Refills: 0 | Status: SHIPPED | OUTPATIENT
Start: 2024-10-31

## 2024-10-31 NOTE — TELEPHONE ENCOUNTER
Requested Prescriptions     Pending Prescriptions Disp Refills    Cholecalciferol (VITAMIN D3) 50 MCG (2000 UT) TABS 90 tablet 0     Sig: Take 1 tablet by mouth daily       Next appt is 1/23/2025  Last appt was 9/26/2024

## 2024-11-01 DIAGNOSIS — E78.2 MIXED HYPERLIPIDEMIA: ICD-10-CM

## 2024-11-01 RX ORDER — ATORVASTATIN CALCIUM 20 MG/1
20 TABLET, FILM COATED ORAL EVERY EVENING
Qty: 30 TABLET | Refills: 2 | Status: SHIPPED | OUTPATIENT
Start: 2024-11-01

## 2024-11-01 NOTE — TELEPHONE ENCOUNTER
Name of Medication(s) Requested:  Requested Prescriptions     Pending Prescriptions Disp Refills    atorvastatin (LIPITOR) 20 MG tablet 30 tablet 0     Sig: Take 1 tablet by mouth every evening Takes at 4PM       Medication is on current medication list Yes    Dosage and directions were verified? Yes    Quantity verified: 30 day supply     Pharmacy Verified?  Yes    Last Appointment:  2/28/2024    Future appts:  Future Appointments   Date Time Provider Department Center   1/23/2025 10:00 AM Doe Song DO [place] [facility] DEP        (If no appt send self scheduling link. .REFILLAPPT)  Scheduling request sent?     [] Yes  [x] No    Does patient need updated?  [] Yes  [x] No

## 2024-11-27 DIAGNOSIS — F17.210 CIGARETTE SMOKER: ICD-10-CM

## 2024-11-27 RX ORDER — BUPROPION HYDROCHLORIDE 150 MG/1
150 TABLET ORAL EVERY MORNING
Qty: 30 TABLET | Refills: 1 | Status: SHIPPED | OUTPATIENT
Start: 2024-11-27

## 2024-11-27 NOTE — TELEPHONE ENCOUNTER
Last seen 9/26/2024  Next appt 1/23/2025    Requested Prescriptions     Pending Prescriptions Disp Refills    buPROPion (WELLBUTRIN XL) 150 MG extended release tablet 30 tablet 3     Sig: Take 1 tablet by mouth every morning      Name of Medication(s) Requested:  Requested Prescriptions     Pending Prescriptions Disp Refills    buPROPion (WELLBUTRIN XL) 150 MG extended release tablet 30 tablet 3     Sig: Take 1 tablet by mouth every morning       Medication is on current medication list Yes    Dosage and directions were verified? Yes    Quantity verified: 30 day supply     Pharmacy Verified?  Yes          Last Appointment:  9/26/2024    Future appts:  Future Appointments   Date Time Provider Department Center   1/23/2025 10:00 AM Doe Song DO [place] [facility] DEP        (If no appt send self scheduling link. .REFILLAPPT)  Scheduling request sent?     [x] Yes  [] No    Does patient need updated?  [x] Yes  [] No

## 2025-01-02 DIAGNOSIS — K21.9 GASTROESOPHAGEAL REFLUX DISEASE WITHOUT ESOPHAGITIS: ICD-10-CM

## 2025-01-02 RX ORDER — LANSOPRAZOLE 30 MG/1
30 CAPSULE, DELAYED RELEASE ORAL DAILY
Qty: 30 CAPSULE | Refills: 0 | Status: SHIPPED | OUTPATIENT
Start: 2025-01-02

## 2025-01-02 NOTE — TELEPHONE ENCOUNTER
Name of Medication(s) Requested:  Requested Prescriptions     Pending Prescriptions Disp Refills    lansoprazole (PREVACID) 30 MG delayed release capsule 30 capsule 0     Sig: Take 1 capsule by mouth daily       Medication is on current medication list Yes    Dosage and directions were verified? Yes    Quantity verified: 30 day supply     Pharmacy Verified?  Yes    Last Appointment:  2/28/2024    Future appts:  Future Appointments   Date Time Provider Department Center   1/23/2025 10:00 AM Doe Song DO [place] [facility] DEP        (If no appt send self scheduling link. .REFILLAPPT)  Scheduling request sent?     [] Yes  [x] No    Does patient need updated?  [] Yes  [x] No

## 2025-01-23 ENCOUNTER — OFFICE VISIT (OUTPATIENT)
Dept: FAMILY MEDICINE CLINIC | Age: 60
End: 2025-01-23
Payer: MEDICARE

## 2025-01-23 VITALS
HEART RATE: 76 BPM | BODY MASS INDEX: 31.22 KG/M2 | RESPIRATION RATE: 16 BRPM | TEMPERATURE: 97.6 F | SYSTOLIC BLOOD PRESSURE: 124 MMHG | DIASTOLIC BLOOD PRESSURE: 76 MMHG | OXYGEN SATURATION: 95 % | WEIGHT: 206 LBS | HEIGHT: 68 IN

## 2025-01-23 DIAGNOSIS — J44.1 COPD EXACERBATION (HCC): ICD-10-CM

## 2025-01-23 DIAGNOSIS — E55.9 VITAMIN D DEFICIENCY: ICD-10-CM

## 2025-01-23 DIAGNOSIS — F17.210 CIGARETTE SMOKER: ICD-10-CM

## 2025-01-23 DIAGNOSIS — E78.2 MIXED HYPERLIPIDEMIA: ICD-10-CM

## 2025-01-23 DIAGNOSIS — M51.360 DEGENERATION OF INTERVERTEBRAL DISC OF LUMBAR REGION WITH DISCOGENIC BACK PAIN: ICD-10-CM

## 2025-01-23 DIAGNOSIS — Z23 IMMUNIZATION DUE: ICD-10-CM

## 2025-01-23 DIAGNOSIS — Z00.00 MEDICARE ANNUAL WELLNESS VISIT, SUBSEQUENT: Primary | ICD-10-CM

## 2025-01-23 DIAGNOSIS — K21.9 GASTROESOPHAGEAL REFLUX DISEASE WITHOUT ESOPHAGITIS: ICD-10-CM

## 2025-01-23 DIAGNOSIS — Z12.31 ENCOUNTER FOR SCREENING MAMMOGRAM FOR MALIGNANT NEOPLASM OF BREAST: ICD-10-CM

## 2025-01-23 PROCEDURE — 3017F COLORECTAL CA SCREEN DOC REV: CPT | Performed by: FAMILY MEDICINE

## 2025-01-23 PROCEDURE — 90677 PCV20 VACCINE IM: CPT | Performed by: FAMILY MEDICINE

## 2025-01-23 PROCEDURE — G0009 ADMIN PNEUMOCOCCAL VACCINE: HCPCS | Performed by: FAMILY MEDICINE

## 2025-01-23 PROCEDURE — G0439 PPPS, SUBSEQ VISIT: HCPCS | Performed by: FAMILY MEDICINE

## 2025-01-23 RX ORDER — BUPROPION HYDROCHLORIDE 150 MG/1
150 TABLET ORAL EVERY MORNING
Qty: 30 TABLET | Refills: 1 | Status: SHIPPED | OUTPATIENT
Start: 2025-01-23

## 2025-01-23 RX ORDER — FLUTICASONE PROPIONATE 50 MCG
2 SPRAY, SUSPENSION (ML) NASAL DAILY
Qty: 16 G | Refills: 0 | Status: SHIPPED | OUTPATIENT
Start: 2025-01-23

## 2025-01-23 RX ORDER — CHOLECALCIFEROL (VITAMIN D3) 50 MCG
1 TABLET ORAL DAILY
Qty: 90 TABLET | Refills: 0 | Status: SHIPPED | OUTPATIENT
Start: 2025-01-23

## 2025-01-23 RX ORDER — LANSOPRAZOLE 30 MG/1
30 CAPSULE, DELAYED RELEASE ORAL DAILY
Qty: 30 CAPSULE | Refills: 0 | Status: SHIPPED | OUTPATIENT
Start: 2025-01-23

## 2025-01-23 RX ORDER — ATORVASTATIN CALCIUM 20 MG/1
20 TABLET, FILM COATED ORAL EVERY EVENING
Qty: 30 TABLET | Refills: 2 | Status: SHIPPED | OUTPATIENT
Start: 2025-01-23

## 2025-01-23 RX ORDER — METHOCARBAMOL 750 MG/1
TABLET, FILM COATED ORAL
COMMUNITY
Start: 2025-01-02

## 2025-01-23 RX ORDER — IBUPROFEN 600 MG/1
600 TABLET, FILM COATED ORAL EVERY 6 HOURS PRN
Qty: 90 TABLET | Refills: 0 | Status: SHIPPED | OUTPATIENT
Start: 2025-01-23

## 2025-01-23 SDOH — ECONOMIC STABILITY: FOOD INSECURITY: WITHIN THE PAST 12 MONTHS, THE FOOD YOU BOUGHT JUST DIDN'T LAST AND YOU DIDN'T HAVE MONEY TO GET MORE.: NEVER TRUE

## 2025-01-23 SDOH — ECONOMIC STABILITY: FOOD INSECURITY: WITHIN THE PAST 12 MONTHS, YOU WORRIED THAT YOUR FOOD WOULD RUN OUT BEFORE YOU GOT MONEY TO BUY MORE.: NEVER TRUE

## 2025-01-23 ASSESSMENT — PATIENT HEALTH QUESTIONNAIRE - PHQ9
2. FEELING DOWN, DEPRESSED OR HOPELESS: NOT AT ALL
1. LITTLE INTEREST OR PLEASURE IN DOING THINGS: NOT AT ALL
SUM OF ALL RESPONSES TO PHQ QUESTIONS 1-9: 0
SUM OF ALL RESPONSES TO PHQ9 QUESTIONS 1 & 2: 0

## 2025-01-23 ASSESSMENT — LIFESTYLE VARIABLES
HOW MANY STANDARD DRINKS CONTAINING ALCOHOL DO YOU HAVE ON A TYPICAL DAY: 1 OR 2
HOW OFTEN DO YOU HAVE A DRINK CONTAINING ALCOHOL: MONTHLY OR LESS

## 2025-01-23 NOTE — PATIENT INSTRUCTIONS
your healthcare professional. [employer], disclaims any warranty or liability for your use of this information.         Starting a Weight-Loss Plan: Care Instructions  Overview    It can be a challenge to lose weight. But your doctor can help you make a weight-loss plan that meets your needs.  You don't have to make a lot of big changes at once. A better idea might be to focus on small changes and stick with them. When those changes become habit, you can add a few more changes.  Some people find it helpful to take an exercise or nutrition class. If you have questions, ask your doctor about seeing a registered dietitian or an exercise specialist. You might also think about joining a weight-loss support group.  If you're not ready to make changes right now, try to pick a date in the future. Then make an appointment with your doctor to talk about when and how you'll get started with a plan.  Follow-up care is a key part of your treatment and safety. Be sure to make and go to all appointments, and call your doctor if you are having problems. It's also a good idea to know your test results and keep a list of the medicines you take.  How can you care for yourself as you start a weight-loss plan?  Set realistic goals. Many people expect to lose much more weight than is likely. A weight loss of 5% to 10% of your body weight may be enough to improve your health.  Get family and friends involved to provide support. Talk to them about why you are trying to lose weight, and ask them to help. They can help by participating in exercise and having meals with you, even if they may be eating something different.  Find what works best for you. If you do not have time or do not like to cook, a program that offers meal replacement bars or shakes may be better for you. Or if you like to prepare meals, finding a plan that includes daily menus and recipes may be best.  Ask your doctor about other health professionals who can

## 2025-01-23 NOTE — PROGRESS NOTES
Medicare Annual Wellness Visit    N[place] River is here for Medicare AWV (No concerns or issues at this time./Medication refills )    Assessment & Plan   Degeneration of intervertebral disc of lumbar region with discogenic back pain  -     ibuprofen (ADVIL;MOTRIN) 600 MG tablet; Take 1 tablet by mouth every 6 hours as needed for Pain, Disp-90 tablet, R-0Normal  Gastroesophageal reflux disease without esophagitis  -     lansoprazole (PREVACID) 30 MG delayed release capsule; Take 1 capsule by mouth daily, Disp-30 capsule, R-0PATIENT NEEDS APPOINTMENT FOR FURTHER REFILLSNormal  COPD exacerbation (HCC)  -     fluticasone (FLONASE) 50 MCG/ACT nasal spray; 2 sprays by Each Nostril route daily, Disp-16 g, R-0Normal  Vitamin D deficiency  -     Cholecalciferol (VITAMIN D3) 50 MCG (2000 UT) TABS; Take 1 tablet by mouth daily, Disp-90 tablet, R-0Normal  Cigarette smoker  -     buPROPion (WELLBUTRIN XL) 150 MG extended release tablet; Take 1 tablet by mouth every morning, Disp-30 tablet, R-1Normal  Mixed hyperlipidemia  -     atorvastatin (LIPITOR) 20 MG tablet; Take 1 tablet by mouth every evening Takes at 4PM, Disp-30 tablet, R-2Normal       No follow-ups on file.     Subjective   The following acute and/or chronic problems were also addressed today:  Back pan    Patient's complete Health Risk Assessment and screening values have been reviewed and are found in Flowsheets. The following problems were reviewed today and where indicated follow up appointments were made and/or referrals ordered.    Positive Risk Factor Screenings with Interventions:          Controlled Medication Review:    Today's Pain Level: No data recorded   Opioid Risk: (Low risk score <55) Opioid risk score: 23    Patient is low risk for opioid use disorder or overdose.    Last PDMP [place] as Reviewed:  Review User Review Instant Review Result   INDY WALLACE 4/4/2024 10:41 AM     Reviewed PDMP [1]     Last Controlled Substance Monitoring Documentation

## 2025-02-27 ENCOUNTER — HOSPITAL ENCOUNTER (OUTPATIENT)
Dept: MAMMOGRAPHY | Age: 60
Discharge: HOME | End: 2025-03-01
Payer: MEDICARE

## 2025-02-27 VITALS — HEIGHT: 68 IN | WEIGHT: 198 LBS | BODY MASS INDEX: 30.01 KG/M2

## 2025-02-27 PROCEDURE — 77063 BREAST TOMOSYNTHESIS BI: CPT

## 2025-03-07 DIAGNOSIS — K21.9 GASTROESOPHAGEAL REFLUX DISEASE WITHOUT ESOPHAGITIS: ICD-10-CM

## 2025-03-07 RX ORDER — LANSOPRAZOLE 30 MG/1
30 CAPSULE, DELAYED RELEASE ORAL DAILY
Qty: 30 CAPSULE | Refills: 0 | Status: SHIPPED | OUTPATIENT
Start: 2025-03-07

## 2025-03-07 NOTE — TELEPHONE ENCOUNTER
Last seen 1/23/2025  Next appt Visit date not found    Requested Prescriptions     Pending Prescriptions Disp Refills    lansoprazole (PREVACID) 30 MG delayed release capsule 30 capsule 0     Sig: Take 1 capsule by mouth daily      Name of Medication(s) Requested:  Requested Prescriptions     Pending Prescriptions Disp Refills    lansoprazole (PREVACID) 30 MG delayed release capsule 30 capsule 0     Sig: Take 1 capsule by mouth daily       Medication is on current medication list Yes    Dosage and directions were verified? Yes    Quantity verified: 30 day supply     Pharmacy Verified?  Yes    Last Appointment:  1/23/2025    Future appts:  No future appointments.     (If no appt send self scheduling link. .REFILLAPPT)  Scheduling request sent?     [] Yes  [x] No    Does patient need updated?  [] Yes  [x] No

## 2025-03-27 DIAGNOSIS — F17.210 CIGARETTE SMOKER: ICD-10-CM

## 2025-03-27 DIAGNOSIS — M51.360 DEGENERATION OF INTERVERTEBRAL DISC OF LUMBAR REGION WITH DISCOGENIC BACK PAIN: ICD-10-CM

## 2025-03-27 DIAGNOSIS — K21.9 GASTROESOPHAGEAL REFLUX DISEASE WITHOUT ESOPHAGITIS: ICD-10-CM

## 2025-03-27 RX ORDER — IBUPROFEN 600 MG/1
600 TABLET, FILM COATED ORAL EVERY 6 HOURS PRN
Qty: 90 TABLET | Refills: 0 | Status: SHIPPED | OUTPATIENT
Start: 2025-03-27

## 2025-03-27 RX ORDER — LANSOPRAZOLE 30 MG/1
30 CAPSULE, DELAYED RELEASE ORAL DAILY
Qty: 30 CAPSULE | Refills: 0 | OUTPATIENT
Start: 2025-03-27

## 2025-03-27 RX ORDER — BUPROPION HYDROCHLORIDE 150 MG/1
150 TABLET ORAL EVERY MORNING
Qty: 30 TABLET | Refills: 1 | Status: SHIPPED | OUTPATIENT
Start: 2025-03-27

## 2025-03-27 NOTE — TELEPHONE ENCOUNTER
Name of Medication(s) Requested:  Requested Prescriptions     Refused Prescriptions Disp Refills    lansoprazole (PREVACID) 30 MG delayed release capsule 30 capsule 0     Sig: Take 1 capsule by mouth daily     Refused By: PAKO MILLER     Reason for Refusal: Duplicate Request       Medication is on current medication list Yes    Dosage and directions were verified? Yes    Quantity verified: 30 day supply     Pharmacy Verified?  Yes    Last Appointment:  9/26/2024    Future appts:  No future appointments.     (If no appt send self scheduling link. .REFILLAPPT)  Scheduling request sent?     [] Yes  [] No    Does patient need updated?  [] Yes  [] No

## 2025-03-27 NOTE — TELEPHONE ENCOUNTER
Name of Medication(s) Requested:  Requested Prescriptions     Pending Prescriptions Disp Refills    ibuprofen (ADVIL;MOTRIN) 600 MG tablet 90 tablet 0     Sig: Take 1 tablet by mouth every 6 hours as needed for Pain    buPROPion (WELLBUTRIN XL) 150 MG extended release tablet 30 tablet 1     Sig: Take 1 tablet by mouth every morning       Medication is on current medication list Yes    Dosage and directions were verified? Yes    Quantity verified: 30 day supply     Pharmacy Verified?  Yes    Last Appointment:  1/23/2025    Future appts:  No future appointments.     (If no appt send self scheduling link. .REFILLAPPT)  Scheduling request sent?     [] Yes  [] No    Does patient need updated?  [] Yes  [] No

## 2025-03-31 ENCOUNTER — OFFICE VISIT (OUTPATIENT)
Dept: FAMILY MEDICINE CLINIC | Age: 60
End: 2025-03-31
Payer: MEDICARE

## 2025-03-31 VITALS
RESPIRATION RATE: 16 BRPM | HEIGHT: 68 IN | SYSTOLIC BLOOD PRESSURE: 118 MMHG | DIASTOLIC BLOOD PRESSURE: 64 MMHG | OXYGEN SATURATION: 93 % | BODY MASS INDEX: 30.11 KG/M2 | TEMPERATURE: 97.6 F | HEART RATE: 89 BPM

## 2025-03-31 DIAGNOSIS — J20.9 BRONCHITIS WITH BRONCHOSPASM: ICD-10-CM

## 2025-03-31 DIAGNOSIS — R05.1 ACUTE COUGH: Primary | ICD-10-CM

## 2025-03-31 LAB
INFLUENZA A ANTIGEN, POC: NEGATIVE
INFLUENZA B ANTIGEN, POC: NEGATIVE
LOT EXPIRE DATE: NORMAL
LOT KIT NUMBER: NORMAL
SARS-COV-2, POC: NORMAL
VALID INTERNAL CONTROL: NORMAL
VENDOR AND KIT NAME POC: NORMAL

## 2025-03-31 PROCEDURE — G8427 DOCREV CUR MEDS BY ELIG CLIN: HCPCS | Performed by: FAMILY MEDICINE

## 2025-03-31 PROCEDURE — 3017F COLORECTAL CA SCREEN DOC REV: CPT | Performed by: FAMILY MEDICINE

## 2025-03-31 PROCEDURE — G8417 CALC BMI ABV UP PARAM F/U: HCPCS | Performed by: FAMILY MEDICINE

## 2025-03-31 PROCEDURE — 4004F PT TOBACCO SCREEN RCVD TLK: CPT | Performed by: FAMILY MEDICINE

## 2025-03-31 PROCEDURE — 87428 SARSCOV & INF VIR A&B AG IA: CPT | Performed by: FAMILY MEDICINE

## 2025-03-31 PROCEDURE — 99213 OFFICE O/P EST LOW 20 MIN: CPT | Performed by: FAMILY MEDICINE

## 2025-03-31 RX ORDER — PREDNISONE 5 MG/1
TABLET ORAL
Qty: 21 EACH | Refills: 0 | Status: SHIPPED | OUTPATIENT
Start: 2025-03-31

## 2025-03-31 RX ORDER — AZITHROMYCIN 250 MG/1
TABLET, FILM COATED ORAL
Qty: 6 TABLET | Refills: 0 | Status: SHIPPED | OUTPATIENT
Start: 2025-03-31

## 2025-03-31 ASSESSMENT — ENCOUNTER SYMPTOMS
COUGH: 1
NAUSEA: 0
SHORTNESS OF BREATH: 1
CHEST TIGHTNESS: 1
CONSTIPATION: 0
ABDOMINAL PAIN: 0
BLOOD IN STOOL: 0
DIARRHEA: 0
VOMITING: 0
WHEEZING: 1

## 2025-03-31 ASSESSMENT — PATIENT HEALTH QUESTIONNAIRE - PHQ9
SUM OF ALL RESPONSES TO PHQ QUESTIONS 1-9: 0
1. LITTLE INTEREST OR PLEASURE IN DOING THINGS: NOT AT ALL
SUM OF ALL RESPONSES TO PHQ QUESTIONS 1-9: 0
SUM OF ALL RESPONSES TO PHQ QUESTIONS 1-9: 0
2. FEELING DOWN, DEPRESSED OR HOPELESS: NOT AT ALL
SUM OF ALL RESPONSES TO PHQ QUESTIONS 1-9: 0

## 2025-03-31 NOTE — PROGRESS NOTES
Niecy River (:  1965) is a 59 y.o. female,Established patient, here for evaluation of the following chief complaint(s):  Cough (Cough, congestion, ear pain, nausea, fever, chills, headache, shortness of breath, wheezing x 4 days /Seen at  x4 weeks was on atx slight improvement but not much. )      Assessment & Plan   ASSESSMENT/PLAN:  Assessment & Plan  Acute cough   New, not at goal (unstable),  neew ABTX    Orders:    POCT COVID-19 & Influenza A/B    azithromycin (ZITHROMAX Z-CRISTÓBAL) 250 MG tablet; 2 po on day #1, then 1 po qd for 4 days    predniSONE 5 MG (21) TBPK; Take 6 pills on day 1, 5 pills on day 2,4 pills on day 3, 3 pills on day 4, 2 pills on day 5, 1 pill on day 6.    Bronchitis with bronchospasm   Chronic, not at goal (unstable), new therapy per inhalation    Orders:    azithromycin (ZITHROMAX Z-CRISTÓBAL) 250 MG tablet; 2 po on day #1, then 1 po qd for 4 days    predniSONE 5 MG (21) TBPK; Take 6 pills on day 1, 5 pills on day 2,4 pills on day 3, 3 pills on day 4, 2 pills on day 5, 1 pill on day 6.       Assessment & Plan  1. Cough  - Persistent cough, especially at night, accompanied by fever and productive sputum.  - Physical exam reveals signs consistent with bronchitis.  - Discussed the impact of smoking on her condition and the potential risks of using inhalers due to heart issues.  - Prescribed prednisone and Z-Cristóbal; advised to avoid close contact with immunocompromised individuals, including those who are obese, diabetic, undergoing chemotherapy, or residing in nursing homes.    No follow-ups on file.         Subjective   SUBJECTIVE/OBJECTIVE:  History of Present Illness  The patient presents for evaluation of a cough.    Persistent coughing at night is reported, accompanied by an unspecified fever. The cough is productive in nature. Smoking menthol cigarettes is noted, and she mentions that smoking sometimes alleviates the cough. Previous use of inhalers is avoided due to associated cardiac

## 2025-04-06 DIAGNOSIS — F17.210 CIGARETTE SMOKER: ICD-10-CM

## 2025-04-06 DIAGNOSIS — E78.2 MIXED HYPERLIPIDEMIA: ICD-10-CM

## 2025-04-07 RX ORDER — BUPROPION HYDROCHLORIDE 150 MG/1
150 TABLET ORAL EVERY MORNING
Qty: 30 TABLET | Refills: 1 | OUTPATIENT
Start: 2025-04-07

## 2025-04-07 RX ORDER — ATORVASTATIN CALCIUM 20 MG/1
20 TABLET, FILM COATED ORAL EVERY EVENING
Qty: 30 TABLET | Refills: 2 | Status: SHIPPED | OUTPATIENT
Start: 2025-04-07

## 2025-04-07 NOTE — TELEPHONE ENCOUNTER
Last seen 3/31/2025  Next appt Visit date not found    Requested Prescriptions     Pending Prescriptions Disp Refills    atorvastatin (LIPITOR) 20 MG tablet 30 tablet 2     Sig: Take 1 tablet by mouth every evening Takes at 4PM      Name of Medication(s) Requested:  Requested Prescriptions     Pending Prescriptions Disp Refills    atorvastatin (LIPITOR) 20 MG tablet 30 tablet 2     Sig: Take 1 tablet by mouth every evening Takes at 4PM       Medication is on current medication list Yes    Dosage and directions were verified? Yes    Quantity verified: 30 day supply     Pharmacy Verified?  Yes    Last Appointment:  3/31/2025    Future appts:  No future appointments.     (If no appt send self scheduling link. .REFILLAPPT)  Scheduling request sent?     [] Yes  [x] No    Does patient need updated?  [] Yes  [x] No

## 2025-04-09 DIAGNOSIS — K21.9 GASTROESOPHAGEAL REFLUX DISEASE WITHOUT ESOPHAGITIS: ICD-10-CM

## 2025-04-09 RX ORDER — LANSOPRAZOLE 30 MG/1
30 CAPSULE, DELAYED RELEASE ORAL DAILY
Qty: 30 CAPSULE | Refills: 0 | Status: SHIPPED | OUTPATIENT
Start: 2025-04-09

## 2025-04-09 NOTE — TELEPHONE ENCOUNTER
Last seen 3/31/2025  Next appt Visit date not found    Requested Prescriptions     Pending Prescriptions Disp Refills    lansoprazole (PREVACID) 30 MG delayed release capsule [Pharmacy Med Name: LANSOPRAZOLE 30 MG CPDR 30 Capsule] 30 capsule 0     Sig: TAKE 1 CAPSULE BY MOUTH DAILY      Name of Medication(s) Requested:  Requested Prescriptions     Pending Prescriptions Disp Refills    lansoprazole (PREVACID) 30 MG delayed release capsule [Pharmacy Med Name: LANSOPRAZOLE 30 MG CPDR 30 Capsule] 30 capsule 0     Sig: TAKE 1 CAPSULE BY MOUTH DAILY       Medication is on current medication list Yes    Dosage and directions were verified? Yes    Quantity verified: 30 day supply     Pharmacy Verified?  Yes    Last Appointment:  9/26/2024    Future appts:  No future appointments.     (If no appt send self scheduling link. .REFILLAPPT)  Scheduling request sent?     [] Yes  [x] No    Does patient need updated?  [] Yes  [x] No

## 2025-04-09 NOTE — TELEPHONE ENCOUNTER
Last seen 3/31/2025  Next appt Visit date not found    Requested Prescriptions     Pending Prescriptions Disp Refills    lansoprazole (PREVACID) 30 MG delayed release capsule 30 capsule 0     Sig: Take 1 capsule by mouth daily      Name of Medication(s) Requested:  Requested Prescriptions     Pending Prescriptions Disp Refills    lansoprazole (PREVACID) 30 MG delayed release capsule 30 capsule 0     Sig: Take 1 capsule by mouth daily       Medication is on current medication list Yes    Dosage and directions were verified? Yes    Quantity verified: 30 day supply     Pharmacy Verified?  Yes    Last Appointment:  9/26/2024    Future appts:  No future appointments.     (If no appt send self scheduling link. .REFILLAPPT)  Scheduling request sent?     [] Yes  [x] No    Does patient need updated?  [] Yes  [x] No

## 2025-05-06 DIAGNOSIS — E55.9 VITAMIN D DEFICIENCY: ICD-10-CM

## 2025-05-06 DIAGNOSIS — K21.9 GASTROESOPHAGEAL REFLUX DISEASE WITHOUT ESOPHAGITIS: ICD-10-CM

## 2025-05-06 RX ORDER — CHOLECALCIFEROL (VITAMIN D3) 50 MCG
1 TABLET ORAL DAILY
Qty: 90 TABLET | Refills: 0 | Status: SHIPPED | OUTPATIENT
Start: 2025-05-06

## 2025-05-06 RX ORDER — LANSOPRAZOLE 30 MG/1
30 CAPSULE, DELAYED RELEASE ORAL DAILY
Qty: 30 CAPSULE | Refills: 0 | Status: SHIPPED | OUTPATIENT
Start: 2025-05-06

## 2025-05-06 NOTE — TELEPHONE ENCOUNTER
Name of Medication(s) Requested:  Requested Prescriptions     Pending Prescriptions Disp Refills    Cholecalciferol (VITAMIN D3) 50 MCG (2000 UT) TABS 90 tablet 0     Sig: Take 1 tablet by mouth daily    lansoprazole (PREVACID) 30 MG delayed release capsule 30 capsule 0     Sig: Take 1 capsule by mouth daily       Medication is on current medication list Yes    Dosage and directions were verified? Yes    Quantity verified: 30 day supply     Pharmacy Verified?  Yes    Last Appointment:  3/31/2025    Future appts:  No future appointments.     (If no appt send self scheduling link. .REFILLAPPT)  Scheduling request sent?     [] Yes  [x] No    Does patient need updated?  [] Yes  [x] No

## 2025-06-05 DIAGNOSIS — K21.9 GASTROESOPHAGEAL REFLUX DISEASE WITHOUT ESOPHAGITIS: ICD-10-CM

## 2025-06-05 RX ORDER — LANSOPRAZOLE 30 MG/1
30 CAPSULE, DELAYED RELEASE ORAL DAILY
Qty: 30 CAPSULE | Refills: 0 | Status: SHIPPED | OUTPATIENT
Start: 2025-06-05

## 2025-06-05 NOTE — TELEPHONE ENCOUNTER
Name of Medication(s) Requested:  Requested Prescriptions     Pending Prescriptions Disp Refills    lansoprazole (PREVACID) 30 MG delayed release capsule 30 capsule 0     Sig: Take 1 capsule by mouth daily       Medication is on current medication list Yes    Dosage and directions were verified? Yes    Quantity verified: 30 day supply     Pharmacy Verified?  Yes    Last Appointment:  1/27/2023    Future appts:  No future appointments.     (If no appt send self scheduling link. .REFILLAPPT)  Scheduling request sent?     [] Yes  [] No    Does patient need updated?  [] Yes  [] No

## 2025-06-16 ENCOUNTER — APPOINTMENT (OUTPATIENT)
Dept: GENERAL RADIOLOGY | Age: 60
End: 2025-06-16
Payer: MEDICARE

## 2025-06-16 ENCOUNTER — HOSPITAL ENCOUNTER (EMERGENCY)
Age: 60
Discharge: HOME | End: 2025-06-16
Payer: MEDICARE

## 2025-06-16 VITALS
SYSTOLIC BLOOD PRESSURE: 134 MMHG | HEIGHT: 68 IN | TEMPERATURE: 97.7 F | HEART RATE: 88 BPM | OXYGEN SATURATION: 97 % | BODY MASS INDEX: 29.86 KG/M2 | DIASTOLIC BLOOD PRESSURE: 72 MMHG | WEIGHT: 197 LBS | RESPIRATION RATE: 16 BRPM

## 2025-06-16 DIAGNOSIS — S90.31XA CONTUSION OF RIGHT FOOT, INITIAL ENCOUNTER: Primary | ICD-10-CM

## 2025-06-16 PROCEDURE — 73630 X-RAY EXAM OF FOOT: CPT

## 2025-06-16 PROCEDURE — 99283 EMERGENCY DEPT VISIT LOW MDM: CPT

## 2025-06-16 ASSESSMENT — LIFESTYLE VARIABLES
HOW MANY STANDARD DRINKS CONTAINING ALCOHOL DO YOU HAVE ON A TYPICAL DAY: PATIENT DOES NOT DRINK
HOW OFTEN DO YOU HAVE A DRINK CONTAINING ALCOHOL: NEVER

## 2025-06-17 NOTE — ED PROVIDER NOTES
Independent [facility] Visit.        HPI:  6/16/25, Time: 8:51 PM EDT         Niecy River is a 59 y.o. female presenting to the ED for right foot pain, beginning this morning after dropping a 2x4 on her foot.  The complaint has been persistent, moderate in severity, and worsened by standing, walking.  History is provided by the patient emergency department today.  Pain is mostly to the dorsum of the right foot and does not radiate.  No numbness or tingling to the area.  Afebrile without recent travel or sick contacts.  Patient denies all other symptoms and injuries at this time.    Review of Systems:   A complete review of systems was performed and pertinent positives and negatives are stated within HPI, all other systems reviewed and are negative.          --------------------------------------------- PAST HISTORY ---------------------------------------------  Past Medical History:  has a past medical history of Asthma, At high risk for deep venous thrombosis, Cancer (HCC), COPD (chronic obstructive pulmonary disease) ([facility]), COVID-19, DJD (degenerative joint disease) of knee, Emphysema lung (HCC), Factor V Leiden, GERD (gastroesophageal reflux disease), H/O cardiovascular stress test, Headache, Hiatal hernia, Hypotension, Kidney stone, Mixed hyperlipidemia, Prolonged emergence from general anesthesia, Sinus infection, Sleep apnea, and SVT (supraventricular tachycardia).    Past Surgical History:  has a past surgical history that includes back surgery; knee surgery (Left); Tonsillectomy; Tubal ligation; Endometrial ablation; Knee arthroscopy (Right, 05/05/2017); bone biopsy; Endoscopy, colon, diagnostic; Cholecystectomy, laparoscopic (N/A, 03/06/2020); Upper gastrointestinal endoscopy (N/A, 03/06/2020); ablation of dysrhythmic focus (10/06/2020); Nerve Block (Left, 01/31/2023); Nerve Block (Left, 03/14/2023); and Pain management procedure (Left, 4/25/2023).    Social History:  reports that she has been smoking 
Patient with one or more new problems requiring additional work-up/treatment.

## 2025-07-09 DIAGNOSIS — E78.2 MIXED HYPERLIPIDEMIA: ICD-10-CM

## 2025-07-09 DIAGNOSIS — K21.9 GASTROESOPHAGEAL REFLUX DISEASE WITHOUT ESOPHAGITIS: ICD-10-CM

## 2025-07-09 DIAGNOSIS — I47.10 PAROXYSMAL SVT (SUPRAVENTRICULAR TACHYCARDIA): ICD-10-CM

## 2025-07-09 RX ORDER — METOPROLOL SUCCINATE 25 MG/1
25 TABLET, EXTENDED RELEASE ORAL DAILY
Qty: 90 TABLET | Refills: 3 | Status: SHIPPED | OUTPATIENT
Start: 2025-07-09

## 2025-07-09 RX ORDER — LANSOPRAZOLE 30 MG/1
30 CAPSULE, DELAYED RELEASE ORAL DAILY
Qty: 90 CAPSULE | Refills: 0 | Status: SHIPPED | OUTPATIENT
Start: 2025-07-09

## 2025-07-09 RX ORDER — ATORVASTATIN CALCIUM 20 MG/1
20 TABLET, FILM COATED ORAL EVERY EVENING
Qty: 30 TABLET | Refills: 2 | Status: SHIPPED | OUTPATIENT
Start: 2025-07-09

## 2025-07-09 NOTE — TELEPHONE ENCOUNTER
Name of Medication(s) Requested:  Requested Prescriptions     Pending Prescriptions Disp Refills    lansoprazole (PREVACID) 30 MG delayed release capsule 90 capsule 0     Sig: Take 1 capsule by mouth daily       Medication is on current medication list Yes    Dosage and directions were verified? Yes    Quantity verified: 90 day supply     Pharmacy Verified?  Yes    Last Appointment:  9/26/2024    Future appts:  No future appointments.     (If no appt send self scheduling link. .REFILLAPPT)  Scheduling request sent?     [] Yes  [x] No    Does patient need updated?  [] Yes  [x] No

## 2025-07-09 NOTE — TELEPHONE ENCOUNTER
Name of Medication(s) Requested:  Requested Prescriptions     Pending Prescriptions Disp Refills    atorvastatin (LIPITOR) 20 MG tablet 30 tablet 2     Sig: Take 1 tablet by mouth every evening Takes at 4PM       Medication is on current medication list Yes    Dosage and directions were verified? Yes    Quantity verified: 90 day supply     Pharmacy Verified?  Yes    Last Appointment:  3/31/2025    Future appts:  No future appointments.     (If no appt send self scheduling link. .REFILLAPPT)  Scheduling request sent?     [] Yes  [] No    Does patient need updated?  [] Yes  [] No

## 2025-08-04 DIAGNOSIS — E78.2 MIXED HYPERLIPIDEMIA: ICD-10-CM

## 2025-08-04 DIAGNOSIS — E55.9 VITAMIN D DEFICIENCY: ICD-10-CM

## 2025-08-04 DIAGNOSIS — M51.360 DEGENERATION OF INTERVERTEBRAL DISC OF LUMBAR REGION WITH DISCOGENIC BACK PAIN: ICD-10-CM

## 2025-08-04 RX ORDER — ATORVASTATIN CALCIUM 20 MG/1
20 TABLET, FILM COATED ORAL EVERY EVENING
Qty: 30 TABLET | Refills: 0 | Status: SHIPPED | OUTPATIENT
Start: 2025-08-04

## 2025-08-04 RX ORDER — IBUPROFEN 600 MG/1
600 TABLET, FILM COATED ORAL EVERY 6 HOURS PRN
Qty: 30 TABLET | Refills: 0 | Status: SHIPPED | OUTPATIENT
Start: 2025-08-04

## 2025-08-04 RX ORDER — CHOLECALCIFEROL (VITAMIN D3) 50 MCG
1 TABLET ORAL DAILY
Qty: 30 TABLET | Refills: 0 | Status: SHIPPED | OUTPATIENT
Start: 2025-08-04

## (undated) DEVICE — GLOVE ORANGE PI 7 1/2   MSG9075

## (undated) DEVICE — NEEDLE SPNL 22GA L3.5IN BLK HUB S STL REG WALL FIT STYL W/

## (undated) DEVICE — SET ENDO INSTR RED YEL LAPAROSCOPIC

## (undated) DEVICE — TOWEL OR BLUEE 16X26IN ST 8 PACK ORB08 16X26ORTWL

## (undated) DEVICE — APPLICATOR MEDICATED 10.5 CC SOLUTION HI LT ORNG CHLORAPREP

## (undated) DEVICE — GLOVE ORANGE PI 8   MSG9080

## (undated) DEVICE — 3M™ RED DOT™ MONITORING ELECTRODE WITH FOAM TAPE AND STICKY GEL 2560, 50/BAG, 20/CASE, 72/PLT: Brand: RED DOT™

## (undated) DEVICE — GAUZE,SPONGE,4"X4",12PLY,STERILE,LF,2'S: Brand: MEDLINE

## (undated) DEVICE — 6 ML SYRINGE LUER-LOCK TIP: Brand: MONOJECT

## (undated) DEVICE — BLOCK BITE 60FR CAREGUARD

## (undated) DEVICE — NEEDLE HYPO 25GA L1.5IN BLU POLYPR HUB S STL REG BVL STR

## (undated) DEVICE — TROCAR: Brand: KII SLEEVE

## (undated) DEVICE — INSUFFLATION NEEDLE TO ESTABLISH PNEUMOPERITONEUM.: Brand: INSUFFLATION NEEDLE

## (undated) DEVICE — DOUBLE BASIN SET: Brand: MEDLINE INDUSTRIES, INC.

## (undated) DEVICE — 12 ML SYRINGE,LUER-LOCK TIP: Brand: MONOJECT

## (undated) DEVICE — GLOVE ORANGE PI 7   MSG9070

## (undated) DEVICE — APPLIER CLP M/L SHFT DIA5MM 15 LIG LIGAMAX 5

## (undated) DEVICE — MARKER,SKIN,WI/RULER AND LABELS: Brand: MEDLINE

## (undated) DEVICE — 3 ML SYRINGE LUER-LOCK TIP: Brand: MONOJECT

## (undated) DEVICE — PMI PTFE COATED LAPAROSCOPIC WIRE L-HOOK 44 CM: Brand: PMI

## (undated) DEVICE — COUNTER NDL 10 COUNT HLD 20 FOAM BLK SGL MAG

## (undated) DEVICE — PACK SURG LAP CHOLE CUSTOM

## (undated) DEVICE — PLUMEPORT LAPAROSCOPIC SMOKE FILTRATION DEVICE: Brand: PLUMEPORT ACTIV

## (undated) DEVICE — SYRINGE MED 10ML TRNSLUC BRL PLUNG BLK MRK POLYPR CTRL

## (undated) DEVICE — ELECTRODE PT RET AD L9FT HI MOIST COND ADH HYDRGEL CORDED

## (undated) DEVICE — BANDAGE ADH W1XL3IN NAT FAB WVN FLX DURABLE N ADH PD SEAL

## (undated) DEVICE — TOWEL,OR,DSP,ST,BLUE,STD,6/PK,12PK/CS: Brand: MEDLINE

## (undated) DEVICE — NEEDLE, QUINCKE 22GX3.5": Brand: MEDLINE INDUSTRIES, INC.

## (undated) DEVICE — 22GX5" WHITACRE SPINAL NEEDLE: Brand: MEDLINE

## (undated) DEVICE — KIT BEDSIDE REVITAL OX 500ML

## (undated) DEVICE — TROCAR: Brand: KII FIOS FIRST ENTRY

## (undated) DEVICE — NEEDLE HYPO 18GA L1.5IN PNK POLYPR HUB S STL THN WALL FILL

## (undated) DEVICE — CVD CANNULA

## (undated) DEVICE — [HIGH FLOW INSUFFLATOR,  DO NOT USE IF PACKAGE IS DAMAGED,  KEEP DRY,  KEEP AWAY FROM SUNLIGHT,  PROTECT FROM HEAT AND RADIOACTIVE SOURCES.]: Brand: PNEUMOSURE

## (undated) DEVICE — SET ENDO INSTR LAPAROSCOPIC INCISIONAL

## (undated) DEVICE — GARMENT,MEDLINE,DVT,INT,CALF,MED, GEN2: Brand: MEDLINE

## (undated) DEVICE — TUBING, SUCTION, 1/4" X 10', STRAIGHT: Brand: MEDLINE

## (undated) DEVICE — CAMERA STRYKER 1488 HD GEN

## (undated) DEVICE — SKIN AFFIX SURG ADHESIVE 72/CS 0.55ML: Brand: MEDLINE

## (undated) DEVICE — COVER,LIGHT HANDLE,FLX,1/PK: Brand: MEDLINE INDUSTRIES, INC.

## (undated) DEVICE — ELECTRODE SURG MPLR NEUT SELF ADH PT PLT MULTIGEN

## (undated) DEVICE — CHLORAPREP 26ML ORANGE